# Patient Record
Sex: FEMALE | Race: WHITE | Employment: OTHER | URBAN - METROPOLITAN AREA
[De-identification: names, ages, dates, MRNs, and addresses within clinical notes are randomized per-mention and may not be internally consistent; named-entity substitution may affect disease eponyms.]

---

## 2017-07-02 ENCOUNTER — HOSPITAL ENCOUNTER (INPATIENT)
Age: 82
LOS: 9 days | Discharge: NURSING, REHAB OR LONG-TERM CARE | DRG: 690 | End: 2017-07-11
Attending: EMERGENCY MEDICINE | Admitting: INTERNAL MEDICINE
Payer: MEDICARE

## 2017-07-02 ENCOUNTER — APPOINTMENT (OUTPATIENT)
Dept: ULTRASOUND IMAGING | Age: 82
DRG: 690 | End: 2017-07-02
Attending: INTERNAL MEDICINE
Payer: MEDICARE

## 2017-07-02 ENCOUNTER — APPOINTMENT (OUTPATIENT)
Dept: GENERAL RADIOLOGY | Age: 82
DRG: 690 | End: 2017-07-02
Attending: EMERGENCY MEDICINE
Payer: MEDICARE

## 2017-07-02 DIAGNOSIS — R53.83 FATIGUE, UNSPECIFIED TYPE: ICD-10-CM

## 2017-07-02 DIAGNOSIS — M54.50 ACUTE BILATERAL LOW BACK PAIN WITHOUT SCIATICA: ICD-10-CM

## 2017-07-02 DIAGNOSIS — N17.9 AKI (ACUTE KIDNEY INJURY) (HCC): Primary | ICD-10-CM
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
## 2017-07-02 PROBLEM — N39.0 UTI (URINARY TRACT INFECTION): Status: ACTIVE | Noted: 2017-07-02

## 2017-07-02 LAB
ALBUMIN SERPL BCP-MCNC: 3.6 G/DL (ref 3.5–5)
ALBUMIN/GLOB SERPL: 0.9 {RATIO} (ref 1.1–2.2)
ALP SERPL-CCNC: 123 U/L (ref 45–117)
ALT SERPL-CCNC: 22 U/L (ref 12–78)
AMORPH CRY URNS QL MICRO: ABNORMAL
ANION GAP BLD CALC-SCNC: 15 MMOL/L (ref 5–15)
APPEARANCE UR: ABNORMAL
AST SERPL W P-5'-P-CCNC: 21 U/L (ref 15–37)
BACTERIA URNS QL MICRO: ABNORMAL /HPF
BASOPHILS # BLD AUTO: 0 K/UL (ref 0–0.1)
BASOPHILS # BLD: 0 % (ref 0–1)
BILIRUB SERPL-MCNC: 0.3 MG/DL (ref 0.2–1)
BILIRUB UR QL: NEGATIVE
BUN SERPL-MCNC: 44 MG/DL (ref 6–20)
BUN/CREAT SERPL: 11 (ref 12–20)
CALCIUM SERPL-MCNC: 9.3 MG/DL (ref 8.5–10.1)
CHLORIDE SERPL-SCNC: 102 MMOL/L (ref 97–108)
CK MB CFR SERPL CALC: NORMAL % (ref 0–2.5)
CK MB SERPL-MCNC: <1 NG/ML (ref 5–25)
CK SERPL-CCNC: 50 U/L (ref 26–192)
CO2 SERPL-SCNC: 20 MMOL/L (ref 21–32)
COLOR UR: ABNORMAL
CREAT SERPL-MCNC: 3.98 MG/DL (ref 0.55–1.02)
EOSINOPHIL # BLD: 0.2 K/UL (ref 0–0.4)
EOSINOPHIL NFR BLD: 1 % (ref 0–7)
EPITH CASTS URNS QL MICRO: ABNORMAL /LPF
ERYTHROCYTE [DISTWIDTH] IN BLOOD BY AUTOMATED COUNT: 15.9 % (ref 11.5–14.5)
GLOBULIN SER CALC-MCNC: 3.8 G/DL (ref 2–4)
GLUCOSE SERPL-MCNC: 124 MG/DL (ref 65–100)
GLUCOSE UR STRIP.AUTO-MCNC: NEGATIVE MG/DL
HCT VFR BLD AUTO: 40.8 % (ref 35–47)
HGB BLD-MCNC: 13.9 G/DL (ref 11.5–16)
HGB UR QL STRIP: ABNORMAL
KETONES UR QL STRIP.AUTO: NEGATIVE MG/DL
LEUKOCYTE ESTERASE UR QL STRIP.AUTO: ABNORMAL
LIPASE SERPL-CCNC: 84 U/L (ref 73–393)
LYMPHOCYTES # BLD AUTO: 8 % (ref 12–49)
LYMPHOCYTES # BLD: 1.1 K/UL (ref 0.8–3.5)
MCH RBC QN AUTO: 29.4 PG (ref 26–34)
MCHC RBC AUTO-ENTMCNC: 34.1 G/DL (ref 30–36.5)
MCV RBC AUTO: 86.3 FL (ref 80–99)
MONOCYTES # BLD: 1.1 K/UL (ref 0–1)
MONOCYTES NFR BLD AUTO: 7 % (ref 5–13)
NEUTS SEG # BLD: 12.8 K/UL (ref 1.8–8)
NEUTS SEG NFR BLD AUTO: 84 % (ref 32–75)
NITRITE UR QL STRIP.AUTO: POSITIVE
PH UR STRIP: 5.5 [PH] (ref 5–8)
PLATELET # BLD AUTO: 252 K/UL (ref 150–400)
POTASSIUM SERPL-SCNC: 4.3 MMOL/L (ref 3.5–5.1)
PROT SERPL-MCNC: 7.4 G/DL (ref 6.4–8.2)
PROT UR STRIP-MCNC: 30 MG/DL
RBC # BLD AUTO: 4.73 M/UL (ref 3.8–5.2)
RBC #/AREA URNS HPF: ABNORMAL /HPF (ref 0–5)
SODIUM SERPL-SCNC: 137 MMOL/L (ref 136–145)
SODIUM UR-SCNC: 55 MMOL/L
SP GR UR REFRACTOMETRY: 1.01 (ref 1–1.03)
TROPONIN I SERPL-MCNC: <0.04 NG/ML
UA: UC IF INDICATED,UAUC: ABNORMAL
UROBILINOGEN UR QL STRIP.AUTO: 0.2 EU/DL (ref 0.2–1)
WBC # BLD AUTO: 15.2 K/UL (ref 3.6–11)
WBC URNS QL MICRO: ABNORMAL /HPF (ref 0–4)

## 2017-07-02 PROCEDURE — 97530 THERAPEUTIC ACTIVITIES: CPT

## 2017-07-02 PROCEDURE — 82784 ASSAY IGA/IGD/IGG/IGM EACH: CPT | Performed by: INTERNAL MEDICINE

## 2017-07-02 PROCEDURE — 77030005563 HC CATH URETH INT MMGH -A

## 2017-07-02 PROCEDURE — 87186 SC STD MICRODIL/AGAR DIL: CPT | Performed by: INTERNAL MEDICINE

## 2017-07-02 PROCEDURE — 82550 ASSAY OF CK (CPK): CPT | Performed by: EMERGENCY MEDICINE

## 2017-07-02 PROCEDURE — 99285 EMERGENCY DEPT VISIT HI MDM: CPT

## 2017-07-02 PROCEDURE — 74011250637 HC RX REV CODE- 250/637: Performed by: INTERNAL MEDICINE

## 2017-07-02 PROCEDURE — 74011250636 HC RX REV CODE- 250/636: Performed by: FAMILY MEDICINE

## 2017-07-02 PROCEDURE — 36415 COLL VENOUS BLD VENIPUNCTURE: CPT | Performed by: INTERNAL MEDICINE

## 2017-07-02 PROCEDURE — 87086 URINE CULTURE/COLONY COUNT: CPT | Performed by: INTERNAL MEDICINE

## 2017-07-02 PROCEDURE — 85025 COMPLETE CBC W/AUTO DIFF WBC: CPT | Performed by: EMERGENCY MEDICINE

## 2017-07-02 PROCEDURE — 81001 URINALYSIS AUTO W/SCOPE: CPT | Performed by: INTERNAL MEDICINE

## 2017-07-02 PROCEDURE — 93005 ELECTROCARDIOGRAM TRACING: CPT

## 2017-07-02 PROCEDURE — 84484 ASSAY OF TROPONIN QUANT: CPT | Performed by: EMERGENCY MEDICINE

## 2017-07-02 PROCEDURE — 96374 THER/PROPH/DIAG INJ IV PUSH: CPT

## 2017-07-02 PROCEDURE — 72100 X-RAY EXAM L-S SPINE 2/3 VWS: CPT

## 2017-07-02 PROCEDURE — 87077 CULTURE AEROBIC IDENTIFY: CPT | Performed by: INTERNAL MEDICINE

## 2017-07-02 PROCEDURE — 74011250636 HC RX REV CODE- 250/636: Performed by: EMERGENCY MEDICINE

## 2017-07-02 PROCEDURE — 84300 ASSAY OF URINE SODIUM: CPT | Performed by: INTERNAL MEDICINE

## 2017-07-02 PROCEDURE — 84165 PROTEIN E-PHORESIS SERUM: CPT | Performed by: INTERNAL MEDICINE

## 2017-07-02 PROCEDURE — 74011000258 HC RX REV CODE- 258: Performed by: INTERNAL MEDICINE

## 2017-07-02 PROCEDURE — 83690 ASSAY OF LIPASE: CPT | Performed by: EMERGENCY MEDICINE

## 2017-07-02 PROCEDURE — 97162 PT EVAL MOD COMPLEX 30 MIN: CPT

## 2017-07-02 PROCEDURE — 80053 COMPREHEN METABOLIC PANEL: CPT | Performed by: EMERGENCY MEDICINE

## 2017-07-02 PROCEDURE — 76770 US EXAM ABDO BACK WALL COMP: CPT

## 2017-07-02 PROCEDURE — 51701 INSERT BLADDER CATHETER: CPT

## 2017-07-02 PROCEDURE — 65660000000 HC RM CCU STEPDOWN

## 2017-07-02 PROCEDURE — 74011250636 HC RX REV CODE- 250/636: Performed by: INTERNAL MEDICINE

## 2017-07-02 PROCEDURE — 96361 HYDRATE IV INFUSION ADD-ON: CPT

## 2017-07-02 RX ORDER — LEVOTHYROXINE SODIUM 50 UG/1
50 TABLET ORAL
Status: DISCONTINUED | OUTPATIENT
Start: 2017-07-02 | End: 2017-07-11 | Stop reason: HOSPADM

## 2017-07-02 RX ORDER — SODIUM CHLORIDE 0.9 % (FLUSH) 0.9 %
5-10 SYRINGE (ML) INJECTION EVERY 8 HOURS
Status: DISCONTINUED | OUTPATIENT
Start: 2017-07-02 | End: 2017-07-10 | Stop reason: ALTCHOICE

## 2017-07-02 RX ORDER — TRAMADOL HYDROCHLORIDE 50 MG/1
50 TABLET ORAL
Status: DISCONTINUED | OUTPATIENT
Start: 2017-07-02 | End: 2017-07-11 | Stop reason: HOSPADM

## 2017-07-02 RX ORDER — HEPARIN SODIUM 5000 [USP'U]/ML
5000 INJECTION, SOLUTION INTRAVENOUS; SUBCUTANEOUS EVERY 8 HOURS
Status: DISCONTINUED | OUTPATIENT
Start: 2017-07-02 | End: 2017-07-11 | Stop reason: HOSPADM

## 2017-07-02 RX ORDER — ONDANSETRON 2 MG/ML
4 INJECTION INTRAMUSCULAR; INTRAVENOUS
Status: DISCONTINUED | OUTPATIENT
Start: 2017-07-02 | End: 2017-07-11 | Stop reason: HOSPADM

## 2017-07-02 RX ORDER — SODIUM CHLORIDE 9 MG/ML
75 INJECTION, SOLUTION INTRAVENOUS CONTINUOUS
Status: DISCONTINUED | OUTPATIENT
Start: 2017-07-02 | End: 2017-07-07

## 2017-07-02 RX ORDER — VENLAFAXINE HYDROCHLORIDE 75 MG/1
75 CAPSULE, EXTENDED RELEASE ORAL DAILY
COMMUNITY

## 2017-07-02 RX ORDER — ACETAMINOPHEN AND CODEINE PHOSPHATE 300; 15 MG/1; MG/1
1 TABLET ORAL
COMMUNITY
End: 2017-07-17

## 2017-07-02 RX ORDER — NALOXONE HYDROCHLORIDE 0.4 MG/ML
0.4 INJECTION, SOLUTION INTRAMUSCULAR; INTRAVENOUS; SUBCUTANEOUS AS NEEDED
Status: DISCONTINUED | OUTPATIENT
Start: 2017-07-02 | End: 2017-07-11 | Stop reason: HOSPADM

## 2017-07-02 RX ORDER — GABAPENTIN 100 MG/1
100 CAPSULE ORAL 3 TIMES DAILY
COMMUNITY
End: 2017-07-24

## 2017-07-02 RX ORDER — ONDANSETRON 2 MG/ML
4 INJECTION INTRAMUSCULAR; INTRAVENOUS ONCE
Status: COMPLETED | OUTPATIENT
Start: 2017-07-02 | End: 2017-07-02

## 2017-07-02 RX ORDER — SODIUM CHLORIDE 0.9 % (FLUSH) 0.9 %
5-10 SYRINGE (ML) INJECTION AS NEEDED
Status: DISCONTINUED | OUTPATIENT
Start: 2017-07-02 | End: 2017-07-11 | Stop reason: HOSPADM

## 2017-07-02 RX ORDER — BUPROPION HYDROCHLORIDE 150 MG/1
300 TABLET ORAL DAILY
Status: DISCONTINUED | OUTPATIENT
Start: 2017-07-02 | End: 2017-07-03

## 2017-07-02 RX ORDER — ONDANSETRON 2 MG/ML
4 INJECTION INTRAMUSCULAR; INTRAVENOUS
Status: DISPENSED | OUTPATIENT
Start: 2017-07-02 | End: 2017-07-02

## 2017-07-02 RX ORDER — BUPROPION HYDROCHLORIDE 300 MG/1
300 TABLET ORAL
COMMUNITY

## 2017-07-02 RX ADMIN — Medication 10 ML: at 05:02

## 2017-07-02 RX ADMIN — BUPROPION HYDROCHLORIDE 300 MG: 150 TABLET, FILM COATED, EXTENDED RELEASE ORAL at 09:35

## 2017-07-02 RX ADMIN — HEPARIN SODIUM 5000 UNITS: 5000 INJECTION, SOLUTION INTRAVENOUS; SUBCUTANEOUS at 23:02

## 2017-07-02 RX ADMIN — TRAMADOL HYDROCHLORIDE 50 MG: 50 TABLET, FILM COATED ORAL at 10:32

## 2017-07-02 RX ADMIN — VENLAFAXINE HYDROCHLORIDE 225 MG: 150 CAPSULE, EXTENDED RELEASE ORAL at 09:35

## 2017-07-02 RX ADMIN — HEPARIN SODIUM 5000 UNITS: 5000 INJECTION, SOLUTION INTRAVENOUS; SUBCUTANEOUS at 05:02

## 2017-07-02 RX ADMIN — Medication 10 ML: at 23:03

## 2017-07-02 RX ADMIN — LEVOTHYROXINE SODIUM 50 MCG: 50 TABLET ORAL at 08:02

## 2017-07-02 RX ADMIN — Medication 10 ML: at 15:58

## 2017-07-02 RX ADMIN — TRAMADOL HYDROCHLORIDE 50 MG: 50 TABLET, FILM COATED ORAL at 04:26

## 2017-07-02 RX ADMIN — SODIUM CHLORIDE 75 ML/HR: 900 INJECTION, SOLUTION INTRAVENOUS at 16:49

## 2017-07-02 RX ADMIN — SODIUM CHLORIDE 500 ML: 900 INJECTION, SOLUTION INTRAVENOUS at 00:54

## 2017-07-02 RX ADMIN — TRAMADOL HYDROCHLORIDE 50 MG: 50 TABLET, FILM COATED ORAL at 16:48

## 2017-07-02 RX ADMIN — ONDANSETRON 4 MG: 2 INJECTION INTRAMUSCULAR; INTRAVENOUS at 16:11

## 2017-07-02 RX ADMIN — SODIUM CHLORIDE 75 ML/HR: 900 INJECTION, SOLUTION INTRAVENOUS at 05:02

## 2017-07-02 RX ADMIN — ONDANSETRON 4 MG: 2 INJECTION INTRAMUSCULAR; INTRAVENOUS at 00:54

## 2017-07-02 RX ADMIN — HEPARIN SODIUM 5000 UNITS: 5000 INJECTION, SOLUTION INTRAVENOUS; SUBCUTANEOUS at 12:36

## 2017-07-02 RX ADMIN — CEFTRIAXONE SODIUM 1 G: 1 INJECTION, POWDER, FOR SOLUTION INTRAMUSCULAR; INTRAVENOUS at 04:26

## 2017-07-02 NOTE — PROGRESS NOTES
Primary Nurse Mairlia Arcos RN and Wilver Mckinney RN performed a dual skin assessment on this patient No impairment noted  Rudi score is 18

## 2017-07-02 NOTE — PROGRESS NOTES
Gave verbal sign-out over the phone at 7:02 AM to Dr. Smita Pastor whose practice has accepted care of this patient.       Christian Hernandez MD  7/2/2017

## 2017-07-02 NOTE — H&P
[address]  [phone]    Hospitalist Admission Note      NAME:  Reji Santos   :   1932   MRN:  [id number]     PCP:  Jed Mccoy MD     Date/Time:  2017 4:34 AM          Subjective:     CHIEF COMPLAINT: back pain, fall    HISTORY OF PRESENT ILLNESS:     Ms. David Tripathi is a 80 y.o. female w/ hx of HTN, hypothyroidism, depression, prior PE who presents with low back pain after fall. Per family at bedside, pt has been very weak in recent weeks, intermittent, moderate, worsening, associated with falling at home. Yesterday, pt was walking to bathroom and fell to the floor on her back. Now with severe back pain. No weakness or numbness. No fevers/chills, cough, CP, SOB. Reports nausea    ED workup showed severe NIMCO, leukocytosis. Ms. David Tripathi is admitted for further evaluation and management. Past Medical History:   Diagnosis Date    Arthritis     Asthma     Autoimmune hemolytic anemia (HCC)     Depression     Heart murmur     Hypertension     Hypothyroidism     Pulmonary embolism (HCC)     Thromboembolus (HCC)     Vertigo         Past Surgical History:   Procedure Laterality Date    HX COLONOSCOPY      HX HEENT      HX HYSTERECTOMY      HX ORTHOPAEDIC  R total hip    HX REFRACTIVE SURGERY         Social History   Substance Use Topics    Smoking status: Never Smoker    Smokeless tobacco: Not on file    Alcohol use No        Family History: family history of various cancers. +HTN, +DM    Allergies   Allergen Reactions    Percocet [Oxycodone-Acetaminophen] Anxiety     Anxiety, shaking     Pcn [Penicillins] Rash     Patient states this happened ~ 40 years ago  Tolerates ceftriaxone    Sulfa (Sulfonamide Antibiotics) Itching and Vertigo        Prior to Admission medications    Medication Sig Start Date End Date Taking?  Authorizing Provider   gabapentin (NEURONTIN) 100 mg capsule Take 100 mg by mouth three (3) times daily. Yes Melo Raya MD   acetaminophen-codeine (TYLENOL #2) 300-15 mg per tablet Take 1 Tab by mouth every four (4) hours as needed for Pain. Yes Melo Raya MD   buPROPion (WELLBUTRIN) 75 mg tablet Take 300 mg by mouth two (2) times a day. Yes Melo Raya MD   venlafaxine-SR (EFFEXOR XR) 150 mg capsule Take 225 mg by mouth daily. Yes Melo Raya MD   levothyroxine (LEVOTHROID) 50 mcg tablet Take 50 mcg by mouth Daily (before breakfast). Yes Historical Provider   albuterol (PROAIR HFA) 90 mcg/actuation inhaler Take 2 puffs by inhalation every four (4) hours as needed. Historical Provider   CARBAMIDE PEROXIDE (EAR WAX DROPS OT) 1 drop by Otic route daily as needed. Historical Provider       Review of Systems:  (bold if positive, if negative)    Gen:  fatigueEyes:  ENT:  CVS:  Pulm:  GI:  nausea, emesis  :    MS:  Pain, weakness, arthritisSkin:  Psych:  Endo:    Hem:  Renal:    Neuro:            Objective:      VITALS:    Vital signs reviewed; most recent are:    Visit Vitals    /70 (BP 1 Location: Left arm, BP Patient Position: At rest)    Pulse 80    Temp 97.3 °F (36.3 °C)    Resp 15    Ht 5' 3\" (1.6 m)    Wt 70.3 kg (155 lb)    SpO2 100%    Breastfeeding No    BMI 27.46 kg/m2     SpO2 Readings from Last 6 Encounters:   07/02/17 100%   09/16/16 94%   04/04/16 98%   04/08/15 97%   04/08/15 97%   02/09/15 92%        No intake or output data in the 24 hours ending 07/02/17 8094         Exam:     Physical Exam:    Gen:  Elderly, chronically ill-appearing. NAD  HEENT:  No scleral icterus, PERRL, hearing intact to voice, dry mucous membranes  Neck:  Supple, without masses. Thyroid non-tender  Resp:  No accessory muscle use. CTAB without wheezing, rales, rhonchi  Card: RRR. Normal S1 and S2 without murmurs, rubs, or gallops. No peripheral lower extremity edema. No JVD. Peripheral pulses in tact. Abd:  Normoactive bowel sounds. Soft, non-tender, non-distended. No rebound, no guarding. No appreciable hepatosplenomegaly   Lymph:  No cervical adenopathy  Musc:  No cyanosis or clubbing  Skin:  No rashes or ulcers; turgor intact. Neuro:  Cranial nerves are grossly intact, no focal motor weakness, follows commands appropriately  Psych:  Fair insight, normal affect. Alert, oriented to self and hospital. Answers questions appropriately       Labs:    Recent Labs      07/02/17   0058   WBC  15.2*   HGB  13.9   HCT  40.8   PLT  252     Recent Labs      07/02/17 0058   NA  137   K  4.3   CL  102   CO2  20*   GLU  124*   BUN  44*   CREA  3.98*   CA  9.3   ALB  3.6   SGOT  21   ALT  22     No components found for: GLPOC  No results for input(s): PH, PCO2, PO2, HCO3, FIO2 in the last 72 hours. No results for input(s): INR in the last 72 hours. No lab exists for component: INREXT  No results found for: SDES  Lab Results   Component Value Date/Time    Culture result: ESCHERICHIA COLI 09/16/2016 02:10 PM    Culture result: NO GROWTH 6 DAYS 12/21/2014 05:30 PM    Culture result: NO GROWTH 2 DAYS 12/21/2014 03:50 PM     All other current labs reviewed in the computer. Imaging/Studies:    XR L-spine  1. Superior endplate depression, mild, at L3, age undetermined but new since the  prior study 12/23/2014. This may be acute or subacute. 2. Stable old L1 superior endplate compression. 3. Distal abdominal aortic ectasia. 4. Stable degenerative disc disease at L5-S1. EKG: NSR, no ST-T changes. QTc 462  EKG personally reviewed         Assessment / Plan:       80 y.o. female with hx of HTN, hypothyroidism, depression, prior PE who presents with low back pain after fall, admitted for severe NIMCO    NIMCO (acute kidney injury): unclear etiology. Has mild IVVD  -- send urine Na, Cr, Eos  -- SPEP, UPEP, WILLIAMS  -- renal US  -- nephrology consult     Leukocytosis: unclear etiology. No fevers.  Inflammatory vs infectious  -- start IV CTX for UTI follow urine culture      HTN (hypertension) (10/19/2014): largely controlled  -- not on meds      Depression (10/19/2014)  -- cont Effexor and Wellbutrin      Hypothyroidism (10/19/2014)  -- cont LT4; check TSH      History of PE: not on anticoagulation  -- DVT ppx on heparin SQ       Weakness / Fall: lives with grand-daughter   -- PT / OT, fall precautions      Code Status: DNR, d/w family     Surrogate decision maker: granddaughter       ED notes reviewed, prior oncology notes reviewed    Total time spent with patient: 79 Minutes     Risk of deterioration: High                 Care Plan discussed with: ED provider, Patient and Nursing Staff    Discussed:  Code Status, Care Plan and D/C Planning    Prophylaxis:  Hep SQ    Disposition:  Home w/Family       ___________________________________________________    Attending Physician: Yulisa Mendez MD

## 2017-07-02 NOTE — ROUTINE PROCESS
TRANSFER - OUT REPORT:    Verbal report given to receiving RN (name) on Maria Fernanda Piper-Kaylan  being transferred to room 524 (unit) for routine progression of care       Report consisted of patients Situation, Background, Assessment and   Recommendations(SBAR). Information from the following report(s) SBAR, Kardex, ED Summary, MAR, Recent Results and Cardiac Rhythm NSR was reviewed with the receiving nurse. Lines:   Peripheral IV 07/02/17 Right Forearm (Active)   Site Assessment Clean, dry, & intact 7/2/2017 12:53 AM   Phlebitis Assessment 0 7/2/2017 12:53 AM   Infiltration Assessment 0 7/2/2017 12:53 AM   Dressing Status Clean, dry, & intact 7/2/2017 12:53 AM   Dressing Type Transparent 7/2/2017 12:53 AM   Hub Color/Line Status Pink;Flushed 7/2/2017 12:53 AM   Action Taken Blood drawn 7/2/2017 12:53 AM        Opportunity for questions and clarification was provided.       Patient transported with:   Monitor  Registered Nurse

## 2017-07-02 NOTE — IP AVS SNAPSHOT
Marlin Rodriguez 
 
 
 [address] 
[phone] Patient: Morris Moreno MRN: [id number] SNJ:79/0/4344 You are allergic to the following Allergen Reactions Percocet (Oxycodone-Acetaminophen) Anxiety Anxiety, shaking Pcn (Penicillins) Rash Patient states this happened ~ 40 years ago Tolerates ceftriaxone Sulfa (Sulfonamide Antibiotics) Itching Vertigo Recent Documentation Height Weight Breastfeeding? BMI OB Status Smoking Status 1.6 m 70.3 kg No 27.46 kg/m2 Hysterectomy Never Smoker Emergency Contacts Name Discharge Info Relation Home Work Mobile [facility] [3] Other Relative [6] [phone] About your hospitalization You were admitted on:  July 2, 2017 You last received care in the:  [facility]  MED SURG 2 You were discharged on:  July 11, 2017 Unit phone number:  [phone] Why you were hospitalized Your primary diagnosis was:  Not on File Your diagnoses also included:  Raf (Acute Kidney Injury) (Hcc), Hypothyroidism, Htn (Hypertension), Depression, Leukocytosis, Uti (Urinary Tract Infection) Providers Seen During Your Hospitalizations Provider Role Specialty Primary office phone Noel Jarrett MD Attending Provider Emergency Medicine [phone] Francy Obrien MD Attending Provider Internal Medicine [phone] Sridhar Bower MD Attending Provider [facility] [phone] Your Primary Care Physician (PCP) Primary Care Physician Office Phone Office Fax [facility] [phone] Follow-up Information Follow up With Details Comments Contact Info Rajendra Hinton NP  call for Neurological follow up after discharge from [place] [address] [address] [facility] [address] 
[phone] MD Merrick Cast [facility] [address] [facility] [address] [place] [address] 
[phone] [facility])   [address] 
[phone] Current Discharge Medication List  
  
START taking these medications Dose & Instructions Dispensing Information Comments Morning Noon Evening Bedtime  
 magnesium oxide 400 mg tablet Commonly known as:  MAG-OX Your last dose was: Your next dose is:    
   
   
 Dose:  400 mg Take 1 Tab by mouth two (2) times a day. Quantity:  60 Tab Refills:  0  
     
   
   
   
  
 potassium chloride SR 10 mEq tablet Commonly known as:  KLOR-CON 10 Your last dose was: Your next dose is:    
   
   
 Dose:  10 mEq Take 1 Tab by mouth two (2) times a day. Quantity:  60 Tab Refills:  0  
     
   
   
   
  
 traZODone 50 mg tablet Commonly known as:  Rayma Medal Your last dose was: Your next dose is:    
   
   
 Dose:  25 mg Take 0.5 Tabs by mouth nightly. Quantity:  30 Tab Refills:  0 CONTINUE these medications which have NOT CHANGED Dose & Instructions Dispensing Information Comments Morning Noon Evening Bedtime  
 acetaminophen-codeine 300-15 mg per tablet Commonly known as:  TYLENOL #2 Your last dose was: Your next dose is:    
   
   
 Dose:  1 Tab Take 1 Tab by mouth every four (4) hours as needed for Pain. Refills:  0  
     
   
   
   
  
 EAR WAX DROPS OT Your last dose was: Your next dose is:    
   
   
 Dose:  1 Drop  
1 drop by Otic route daily as needed. Refills:  0  
     
   
   
   
  
 * EFFEXOR  mg capsule Generic drug:  venlafaxine-SR Your last dose was: Your next dose is:    
   
   
 Dose:  150 mg Take 150 mg by mouth daily. In addition to 75 mg daily. Total 225 mg daily. Refills:  0  
     
   
   
   
  
 * venlafaxine-SR 75 mg capsule Commonly known as:  EFFEXOR-XR Your last dose was: Your next dose is:    
   
   
 Dose:  75 mg Take 75 mg by mouth daily. In addition to 150 mg daily. Total 225 mg daily. Refills:  0  
     
   
   
   
  
 gabapentin 100 mg capsule Commonly known as:  NEURONTIN Your last dose was: Your next dose is:    
   
   
 Dose:  100 mg Take 100 mg by mouth three (3) times daily. Refills:  0 LEVOTHROID 50 mcg tablet Generic drug:  levothyroxine Your last dose was: Your next dose is:    
   
   
 Dose:  50 mcg Take 50 mcg by mouth Daily (before breakfast). Refills:  0 PROAIR HFA 90 mcg/actuation inhaler Generic drug:  albuterol Your last dose was: Your next dose is:    
   
   
 Dose:  2 Puff Take 2 puffs by inhalation every four (4) hours as needed. Refills:  0 WELLBUTRIN  mg XL tablet Generic drug:  buPROPion XL Your last dose was: Your next dose is:    
   
   
 Dose:  300 mg Take 300 mg by mouth every morning. Refills:  0  
     
   
   
   
  
 * Notice: This list has 2 medication(s) that are the same as other medications prescribed for you. Read the directions carefully, and ask your doctor or other care provider to review them with you. Where to Get Your Medications Information on where to get these meds will be given to you by the nurse or doctor. ! Ask your nurse or doctor about these medications  
  magnesium oxide 400 mg tablet  
 potassium chloride SR 10 mEq tablet  
 traZODone 50 mg tablet Discharge Instructions Patient Discharge Instructions Brent Martinez / [id number] : 1932 Admitted 2017 Discharged: 2017 12:28 PM  
 
ACUTE DIAGNOSES: 
NIMCO (acute kidney injury) ([facility] CHRONIC MEDICAL DIAGNOSES: 
Problem List as of 2017  Date Reviewed: 2017 Codes Class Noted - Resolved UTI (urinary tract infection) ICD-10-CM: N39.0 ICD-9-CM: 599.0  7/2/2017 - Present Acute respiratory failure ([facility] ICD-10-CM: J96.00 
ICD-9-CM: 518.81  12/21/2014 - Present PE (pulmonary embolism) ICD-10-CM: I26.99 
ICD-9-CM: 415.19  12/11/2014 - Present NIMCO (acute kidney injury) ([facility] ICD-10-CM: N17.9 ICD-9-CM: 584.9  12/11/2014 - Present Renal lesion ICD-10-CM: N28.9 ICD-9-CM: 593.9  11/5/2014 - Present Autoimmune hemolytic anemia (HCC) ICD-10-CM: D59.1 ICD-9-CM: 283.0  11/5/2014 - Present Leukocytosis ICD-10-CM: Z67.154 ICD-9-CM: 288.60  10/25/2014 - Present HTN (hypertension) ICD-10-CM: I10 
ICD-9-CM: 401.9  10/19/2014 - Present Depression ICD-10-CM: F32.9 ICD-9-CM: 931  10/19/2014 - Present Hypothyroidism ICD-10-CM: E03.9 ICD-9-CM: 244.9  10/19/2014 - Present Diarrhea ICD-10-CM: R19.7 ICD-9-CM: 787.91  10/19/2014 - Present Leukopenia ICD-10-CM: D72.819 ICD-9-CM: 288.50  10/19/2014 - Present Anemia ICD-10-CM: D64.9 ICD-9-CM: 285.9  10/19/2014 - Present Transaminitis ICD-10-CM: R74.0 ICD-9-CM: 790.4  10/19/2014 - Present Hyperbilirubinemia ICD-10-CM: E80.6 ICD-9-CM: 782.4  10/19/2014 - Present Weakness ICD-10-CM: R53.1 ICD-9-CM: 780.79  10/18/2014 - Present DISCHARGE MEDICATIONS:  
 
 
 
· It is important that you take the medication exactly as they are prescribed. · Keep your medication in the bottles provided by the pharmacist and keep a list of the medication names, dosages, and times to be taken in your wallet. · Do not take other medications without consulting your doctor. DIET:  Cardiac Diet and Diabetic Diet ACTIVITY: Activity as tolerated ADDITIONAL INFORMATION: If you experience any of the following symptoms then please call your primary care physician or return to the emergency room if you cannot get hold of your doctor: Fever, chills, nausea, vomiting, diarrhea, change in mentation, falling, bleeding, shortness of breath. FOLLOW UP CARE: 
Dr. Papo Huang MD  you are to call and set up an appointment to see them with in 1 week. Follow-up with specialists at directed by them Information obtained by : 
I understand that if any problems occur once I am at home I am to contact my physician. I understand and acknowledge receipt of the instructions indicated above. Physician's or R.N.'s Signature                                                                  Date/Time Patient or Representative Signature                                                          Date/Time Discharge Orders None [employer] Announcement We are excited to announce that we are making your provider's discharge notes available to you in [email or web address]. You will see these notes when they are completed and signed by the physician that discharged you from your recent hospital stay. If you have any questions or concerns about any information you see in [employer], please call the Health Information Department where you were seen or reach out to your Primary Care Provider for more information about your plan of care. Introducing [facility]! Dear [employer]: 
Thank you for requesting a [employer] account. Our records indicate that you have previously registered for a [employer] account but its currently inactive. Please call our [employer] support line at [phone]. Additional Information If you have questions, please visit the Frequently Asked Questions section of the [employer] website at [email or web address] Remember, MyChart is NOT to be used for urgent needs. For medical emergencies, dial 911. Now available from your iPhone and Android! General Information Please provide this summary of care documentation to your next provider. Patient Signature:  ____________________________________________________________ Date:  ____________________________________________________________  
  
Desmond Brake Provider Signature:  ____________________________________________________________ Date:  ____________________________________________________________

## 2017-07-02 NOTE — PROGRESS NOTES
Daily Progress Note: 7/2/2017  Maria Fernanda Piper-Kaylan Jimenez MD    Assessment/Plan:   NIMCO: unclear etiology. Has mild IVVD  --renal labs pending  --will check renal US  -- nephrology consult      Leukocytosis: unclear etiology. No fevers.  Inflammatory vs infectious  -- start IV CTX for UTI follow urine culture    Back pain - superior endplate depression, mild L3  --pain control - consider ortho consult       HTN: watch  -- not on meds       Depression  -- cont Effexor and Wellbutrin       Hypothyroidism  -- cont LT4; check TSH       History of PE: not on anticoagulation  -- DVT ppx on heparin SQ        Weakness / Fall: lives with grand-daughter   -- PT / OT, fall precautions       Problem List:  Problem List as of 7/2/2017  Date Reviewed: 7/2/2017          Codes Class Noted - Resolved    UTI (urinary tract infection) ICD-10-CM: N39.0  ICD-9-CM: 599.0  7/2/2017 - Present        Acute respiratory failure ([facility] ICD-10-CM: J96.00  ICD-9-CM: 518.81  12/21/2014 - Present        PE (pulmonary embolism) ICD-10-CM: I26.99  ICD-9-CM: 415.19  12/11/2014 - Present        NIMCO (acute kidney injury) ([facility] ICD-10-CM: N17.9  ICD-9-CM: 584.9  12/11/2014 - Present        Renal lesion ICD-10-CM: N28.9  ICD-9-CM: 593.9  11/5/2014 - Present        Autoimmune hemolytic anemia ([facility] ICD-10-CM: D59.1  ICD-9-CM: 283.0  11/5/2014 - Present        Leukocytosis ICD-10-CM: J69.437  ICD-9-CM: 288.60  10/25/2014 - Present        HTN (hypertension) ICD-10-CM: I10  ICD-9-CM: 401.9  10/19/2014 - Present        Depression ICD-10-CM: F32.9  ICD-9-CM: 723  10/19/2014 - Present        Hypothyroidism ICD-10-CM: E03.9  ICD-9-CM: 244.9  10/19/2014 - Present        Diarrhea ICD-10-CM: R19.7  ICD-9-CM: 787.91  10/19/2014 - Present        Leukopenia ICD-10-CM: D72.819  ICD-9-CM: 288.50  10/19/2014 - Present        Anemia ICD-10-CM: D64.9  ICD-9-CM: 285.9  10/19/2014 - Present        Transaminitis ICD-10-CM: R74.0  ICD-9-CM: 790.4  10/19/2014 - Present        Hyperbilirubinemia ICD-10-CM: E80.6  ICD-9-CM: 782.4  10/19/2014 - Present        Weakness ICD-10-CM: R53.1  ICD-9-CM: 780.79  10/18/2014 - Present              Subjective:   H&P: Ms. Richard Reyez is a 80 y.o. female w/ hx of HTN, hypothyroidism, depression, prior PE who presents with low back pain after fall. Per family at bedside, pt has been very weak in recent weeks, intermittent, moderate, worsening, associated with falling at home. Yesterday, pt was walking to bathroom and fell to the floor on her back. Now with severe back pain. No weakness or numbness. No fevers/chills, cough, CP, SOB. Reports nausea. ED workup showed severe NIMCO, leukocytosis. :  Patient c/o at least a week of nausea, not eating much. No abd. Pain, bowels have been OK. She denies dysuria. She has had chronic back pain, and now worse after her fall. This AM she still is with back pain but improved, she just ate some breakfast and feels like it will probably stay down. Review of Systems:   A comprehensive review of systems was negative except for that written in the HPI. Objective:   Physical Exam:     Visit Vitals    /70 (BP 1 Location: Left arm, BP Patient Position: At rest)    Pulse 80    Temp 97.3 °F (36.3 °C)    Resp 15    Ht 5' 3\" (1.6 m)    Wt 70.3 kg (155 lb)    SpO2 100%    Breastfeeding No    BMI 27.46 kg/m2      O2 Device: Room air    Temp (24hrs), Av.4 °F (36.3 °C), Min:97.3 °F (36.3 °C), Max:97.5 °F (36.4 °C)             General:  Alert, cooperative, no distress, appears stated age. Head:  Normocephalic, without obvious abnormality, atraumatic. Eyes:  Conjunctivae/corneas clear. PERRL, EOMs intact. Nose: Nares normal. Septum midline. Mucosa normal. No drainage or sinus tenderness. Throat: Lips, mucosa, and tongue moist..   Neck: Supple, symmetrical, trachea midline   Lungs:   Clear to auscultation bilaterally.    Heart:  Regular rate and rhythm, S1, S2 normal, no murmur, click, rub or gallop. Abdomen:   Soft, non-tender. Bowel sounds normal. No masses,  No organomegaly. Extremities: Extremities normal, atraumatic, no cyanosis or edema. No calf tenderness or cords. Pulses: 2+ and symmetric all extremities. Skin: Skin color, texture, turgor normal. No rashes or lesions   Neurologic: CNII-XII intact. Alert and oriented X 3. Fine motor of hands and fingers normal.      Data Review:       Recent Days:  Recent Labs      07/02/17   0058   WBC  15.2*   HGB  13.9   HCT  40.8   PLT  252     Recent Labs      07/02/17   0058   NA  137   K  4.3   CL  102   CO2  20*   GLU  124*   BUN  44*   CREA  3.98*   CA  9.3   ALB  3.6   SGOT  21   ALT  22     No results for input(s): PH, PCO2, PO2, HCO3, FIO2 in the last 72 hours. 24 Hour Results:  Recent Results (from the past 24 hour(s))   CBC WITH AUTOMATED DIFF    Collection Time: 07/02/17 12:58 AM   Result Value Ref Range    WBC 15.2 (H) 3.6 - 11.0 K/uL    RBC 4.73 3.80 - 5.20 M/uL    HGB 13.9 11.5 - 16.0 g/dL    HCT 40.8 35.0 - 47.0 %    MCV 86.3 80.0 - 99.0 FL    MCH 29.4 26.0 - 34.0 PG    MCHC 34.1 30.0 - 36.5 g/dL    RDW 15.9 (H) 11.5 - 14.5 %    PLATELET 467 930 - 396 K/uL    NEUTROPHILS 84 (H) 32 - 75 %    LYMPHOCYTES 8 (L) 12 - 49 %    MONOCYTES 7 5 - 13 %    EOSINOPHILS 1 0 - 7 %    BASOPHILS 0 0 - 1 %    ABS. NEUTROPHILS 12.8 (H) 1.8 - 8.0 K/UL    ABS. LYMPHOCYTES 1.1 0.8 - 3.5 K/UL    ABS. MONOCYTES 1.1 (H) 0.0 - 1.0 K/UL    ABS. EOSINOPHILS 0.2 0.0 - 0.4 K/UL    ABS.  BASOPHILS 0.0 0.0 - 0.1 K/UL   METABOLIC PANEL, COMPREHENSIVE    Collection Time: 07/02/17 12:58 AM   Result Value Ref Range    Sodium 137 136 - 145 mmol/L    Potassium 4.3 3.5 - 5.1 mmol/L    Chloride 102 97 - 108 mmol/L    CO2 20 (L) 21 - 32 mmol/L    Anion gap 15 5 - 15 mmol/L    Glucose 124 (H) 65 - 100 mg/dL    BUN 44 (H) 6 - 20 MG/DL    Creatinine 3.98 (H) 0.55 - 1.02 MG/DL    BUN/Creatinine ratio 11 (L) 12 - 20      GFR est AA 13 (L) >60 ml/min/1.73m2    GFR est non-AA 11 (L) >60 ml/min/1.73m2    Calcium 9.3 8.5 - 10.1 MG/DL    Bilirubin, total 0.3 0.2 - 1.0 MG/DL    ALT (SGPT) 22 12 - 78 U/L    AST (SGOT) 21 15 - 37 U/L    Alk.  phosphatase 123 (H) 45 - 117 U/L    Protein, total 7.4 6.4 - 8.2 g/dL    Albumin 3.6 3.5 - 5.0 g/dL    Globulin 3.8 2.0 - 4.0 g/dL    A-G Ratio 0.9 (L) 1.1 - 2.2     TROPONIN I    Collection Time: 07/02/17 12:58 AM   Result Value Ref Range    Troponin-I, Qt. <0.04 <0.05 ng/mL   LIPASE    Collection Time: 07/02/17 12:58 AM   Result Value Ref Range    Lipase 84 73 - 393 U/L   CK W/ CKMB & INDEX    Collection Time: 07/02/17 12:58 AM   Result Value Ref Range    CK 50 26 - 192 U/L    CK - MB <1.0 <3.6 NG/ML    CK-MB Index Cannot be calulated 0 - 2.5     URINALYSIS W/ REFLEX CULTURE    Collection Time: 07/02/17  2:41 AM   Result Value Ref Range    Color YELLOW/STRAW      Appearance CLOUDY (A) CLEAR      Specific gravity 1.015 1.003 - 1.030      pH (UA) 5.5 5.0 - 8.0      Protein 30 (A) NEG mg/dL    Glucose NEGATIVE  NEG mg/dL    Ketone NEGATIVE  NEG mg/dL    Bilirubin NEGATIVE  NEG      Blood SMALL (A) NEG      Urobilinogen 0.2 0.2 - 1.0 EU/dL    Nitrites POSITIVE (A) NEG      Leukocyte Esterase TRACE (A) NEG      WBC 5-10 0 - 4 /hpf    RBC 0-5 0 - 5 /hpf    Epithelial cells FEW FEW /lpf    Bacteria 1+ (A) NEG /hpf    UA:UC IF INDICATED URINE CULTURE ORDERED (A) CNI      Amorphous Crystals 2+ (A) NEG   EKG, 12 LEAD, INITIAL    Collection Time: 07/02/17  4:15 AM   Result Value Ref Range    Ventricular Rate 81 BPM    Atrial Rate 81 BPM    P-R Interval 196 ms    QRS Duration 108 ms    Q-T Interval 398 ms    QTC Calculation (Bezet) 462 ms    Calculated P Axis 19 degrees    Calculated R Axis -7 degrees    Calculated T Axis 75 degrees    Diagnosis       Normal sinus rhythm  Normal ECG  When compared with ECG of 02-JUL-2017 00:42,  MANUAL COMPARISON REQUIRED, DATA IS UNCONFIRMED         Medications reviewed  Current Facility-Administered Medications Medication Dose Route Frequency    ondansetron (ZOFRAN) injection 4 mg  4 mg IntraVENous NOW    sodium chloride (NS) flush 5-10 mL  5-10 mL IntraVENous Q8H    sodium chloride (NS) flush 5-10 mL  5-10 mL IntraVENous PRN    naloxone (NARCAN) injection 0.4 mg  0.4 mg IntraVENous PRN    levothyroxine (SYNTHROID) tablet 50 mcg  50 mcg Oral ACB    venlafaxine-SR (EFFEXOR-XR) capsule 225 mg  225 mg Oral DAILY    buPROPion XL (WELLBUTRIN XL) tablet 300 mg  300 mg Oral DAILY    cefTRIAXone (ROCEPHIN) 1 g in 0.9% sodium chloride (MBP/ADV) 50 mL  1 g IntraVENous Q24H    traMADol (ULTRAM) tablet 50 mg  50 mg Oral Q6H PRN    0.9% sodium chloride infusion  75 mL/hr IntraVENous CONTINUOUS    heparin (porcine) injection 5,000 Units  5,000 Units SubCUTAneous Buffy Benavides MD

## 2017-07-02 NOTE — PROGRESS NOTES
Bedside and Verbal shift change report given to Mariam Negrete (oncoming nurse) by Angel Sullivan (offgoing nurse). Report included the following information SBAR, Kardex, ED Summary and MAR.

## 2017-07-02 NOTE — ED PROVIDER NOTES
HPI Comments: 80 y.o. female with past medical history significant for HTN, Vertigo, Thromboembolus, PE, Hemolytic anemia, Arthritis, Asthma who presents from home accompanied by relatives for evaluation s/p GLF. Pt reports \"couple days\" hx of nausea and decreased appetite. She was seated in kitchen with relatives at which time she acutely felt nauseous and felt the urge to vomit. She ambulated towards the bathroom without her walker and sustained a GLF. Upon hitting the floor, pt vomited on herself. She c/o moderate low back pain exacerbated with any movement. She ate dinner and did not feel any better. Pt also has been c/o b/l knee pain. She has an appointment scheduled with her PCP for next week. She took 2 Tylenol 3 today for the pain. No LOC, numbness, new weakness, neck pain, or head injury. There are no other acute medical concerns at this time. Social hx: Ambulates with walker  PCP: Eliud Espinosa MD    Note written by Jannette Alberts, as dictated by Domenic Anderson MD 1:15 AM    The history is provided by the patient and a relative. No  was used. Past Medical History:   Diagnosis Date    Arthritis     Asthma     Autoimmune hemolytic anemia (HCC)     Depression     Heart murmur     Hypertension     Hypothyroidism     Pulmonary embolism (HCC)     Thromboembolus (HCC)     Vertigo        Past Surgical History:   Procedure Laterality Date    HX COLONOSCOPY      HX HEENT      HX HYSTERECTOMY      HX ORTHOPAEDIC  R total hip    HX REFRACTIVE SURGERY           No family history on file. Social History     Social History    Marital status:      Spouse name: N/A    Number of children: N/A    Years of education: N/A     Occupational History    Not on file.      Social History Main Topics    Smoking status: Never Smoker    Smokeless tobacco: Not on file    Alcohol use No    Drug use: No    Sexual activity: Not on file     Other Topics Concern    Not on file     Social History Narrative         ALLERGIES: Percocet [oxycodone-acetaminophen]; Pcn [penicillins]; and Sulfa (sulfonamide antibiotics)    Review of Systems   Constitutional: Positive for appetite change (decreased). Negative for fever. HENT: Negative for sore throat. Respiratory: Negative for shortness of breath. Cardiovascular: Negative for chest pain and leg swelling. Gastrointestinal: Positive for nausea and vomiting. Negative for abdominal pain. Genitourinary: Negative for difficulty urinating. Musculoskeletal: Positive for back pain (low). Negative for neck pain. Skin: Negative for rash. Neurological: Negative for headaches. All other systems reviewed and are negative. Vitals:    07/02/17 0020   BP: 161/81   Pulse: 92   Resp: 16   Temp: 97.5 °F (36.4 °C)   SpO2: 95%   Weight: 70.3 kg (155 lb)   Height: 5' 3\" (1.6 m)            Physical Exam   Constitutional: She appears well-developed and well-nourished. No distress. HENT:   Head: Normocephalic and atraumatic. Mouth/Throat: Oropharynx is clear and moist.   Eyes: Conjunctivae and EOM are normal.   Neck: Normal range of motion and phonation normal.   Cardiovascular: Normal rate and intact distal pulses. Pulmonary/Chest: Effort normal. No respiratory distress. Abdominal: Soft. She exhibits no distension. There is no tenderness. There is no rebound. Musculoskeletal: Normal range of motion. She exhibits no tenderness. Lumbar back: She exhibits pain. She exhibits no tenderness. Neurological: She is alert. She is not disoriented. She exhibits normal muscle tone. Skin: Skin is warm and dry. Nursing note and vitals reviewed. ED EKG interpretation:  Rhythm: normal sinus rhythm and 1st degree block; and regular . Rate (approx.): 88; Axis: normal; P wave: normal; QRS interval: normal ; ST/T wave: non-specific changes; Other findings: abnormal ekg.  This EKG was interpreted by Rasheeda Fletcher MD,ED Provider. [facility]  ED Course     2:17 AM  Patient admitted for acute renal failure. Discussed with Dr. Pascual Avalos, will admit.    Procedures

## 2017-07-02 NOTE — ED TRIAGE NOTES
Pt arrives with family with c/o of GLF, patient was on the way to the bathroom, felt nauseas fell and then vomited on her self as per daughter. Pt remembers falling, does not know why. Complains of back pain, nausea and headache. Does report not feeling well the last few days.

## 2017-07-02 NOTE — PROGRESS NOTES
Problem: Mobility Impaired (Adult and Pediatric)  Goal: *Acute Goals and Plan of Care (Insert Text)  Physical Therapy Goals  Initiated 7/2/2017  1. Patient will move from supine to sit and sit to supine in bed with moderate assistance within 7 day(s). 2. Patient will transfer from bed to chair and chair to bed with moderate assistance using the least restrictive device within 7 day(s). 3. Patient will perform sit to stand with moderate assistance within 7 day(s). 4. Patient will ambulate with moderate assistance for 50 feet with the least restrictive device within 7 day(s). 5. Patient will ascend/descend 3 stairs with double handrail(s) with moderate assistance within 7 day(s). PHYSICAL THERAPY EVALUATION  Patient: Sim Bradley [de-identified]80 y.o. female)  Date: 7/2/2017  Primary Diagnosis: NIMCO (acute kidney injury) [facility])  Precautions:  DNR, Fall      ASSESSMENT :  Based on the objective data described below, the patient presents with significant back pain, nausea, and incontinence of urine. Several family members at bedside, with heightened concern for medical issues and proposed discharge needs. Pt admitted late last night with another fall which xray confirms superior endplate depression , mild L-3. Per family members, steady decline in function over the past few weeks, with last witnessed fall about a month ago. Family had begun to seek out medical resources and specialist due to growing concern related to her medical history. She currently lives in mother in law suite at her granddaughter's home (she is , works, and has 3 children to care for as well). Patient's daughter is also present who currently providing caregiving for her father that lives with her. Two granddaughters, and daughter are present and asking relevant questions as it pertains to continued care for the patient at home with this presentation.  Encouraged them to continue to discuss ongoing concerns with therapy staff,  and physician this week. Pt presents with symptoms above which prohibited full mobility today with heightened irritability/ and labile periods. She was agreeable to proceed with mobility but limited by symptoms/pain. Pt is significantly below her functional baseline, and feel that she will require SNF placement for rehab once acute issues are resolved. Family at this time is not interested in LTC placement, but understands the need for increased rehab, personal care, and supervision/assistance with all mobility /ADL's at this time. Pt only able to tolerate dangle at bedside, sit to stand for 5 steps for repositioning with significant increase in pain, nausea. She required mod /max assist for safety due to the above. She requires 24/7 supervision and assistance at this time for safety due to increasing frequency of falls. Patient will benefit from skilled intervention to address the above impairments. Patients rehabilitation potential is considered to be Guarded  Factors which may influence rehabilitation potential include:   [ ]         None noted  [X]         Mental ability/status  [X]         Medical condition  [X]         Home/family situation and support systems  [X]         Safety awareness  [ ]         Pain tolerance/management  [ ]         Other:        PLAN :  Recommendations and Planned Interventions:  [X]           Bed Mobility Training             [ ]    Neuromuscular Re-Education  [X]           Transfer Training                   [ ]    Orthotic/Prosthetic Training  [X]           Gait Training                         [ ]    Modalities  [X]           Therapeutic Exercises           [ ]    Edema Management/Control  [X]           Therapeutic Activities            [X]    Patient and Family Training/Education  [ ]           Other (comment):     Frequency/Duration: Patient will be followed by physical therapy  5 times a week to address goals.   Discharge Recommendations: Cristo Lorenzo Equipment Recommendations for Discharge: TBD       SUBJECTIVE:   Patient stated Ministerio Browning is the happening to me. Every time I cough I pee! Emily Chisholm      OBJECTIVE DATA SUMMARY:   HISTORY:    Past Medical History:   Diagnosis Date    Arthritis      Asthma      Autoimmune hemolytic anemia (HCC)      Depression      Heart murmur      Hypertension      Hypothyroidism      Pulmonary embolism (HCC)      Thromboembolus (HCC)      Vertigo       Past Surgical History:   Procedure Laterality Date    HX COLONOSCOPY        HX HEENT        HX HYSTERECTOMY        HX ORTHOPAEDIC   R total hip    HX REFRACTIVE SURGERY         Prior Level of Function/Home Situation: Per family members, pt with a steady decline in functional independence requiring assistance with most activities of daily living. Despite use of walker, pt has exhibited an increase in falls with a reduction in UE/LE strength. Personal factors and/or comorbidities impacting plan of care: Supportive care by family is available, but not 24/7. Pt currently living in law suit independently, but has become more of a safety concern. Medical concerns include back pain, HTN, depression, and renal insufficiency. Home Situation  Home Environment: Private residence  # Steps to Enter: 0  One/Two Story Residence: One story  Living Alone: No  Support Systems: Child(meme), Family member(s)  Patient Expects to be Discharged to[de-identified] Skilled nursing facility  Current DME Used/Available at Home: royer Jimenez, [address] [facility] chair  Tub or Shower Type: Shower     EXAMINATION/PRESENTATION/DECISION MAKING:   Critical Behavior:  Neurologic State: Irritable, Confused, Alert  Orientation Level: Oriented to person, Oriented to place  Cognition: Follows commands, Impaired decision making  Safety/Judgement: Decreased insight into deficits, Decreased awareness of need for assistance  Hearing:   Auditory  Auditory Impairment: Hard of hearing, bilateral  Skin:  Intact, but incontinent of urine today with increased frequency of cough  Edema: none significant  Range Of Motion:  AROM: Generally decreased, functional   Strength:    Strength: Generally decreased, functional Early onset of fatigue however noted   Tone & Sensation:   Tone: Normal   Sensation: Intact   Coordination:  Coordination: Generally decreased, functional  Functional Mobility:  Bed Mobility:  Rolling: Moderate assistance;Assist x2; Additional time  Supine to Sit: Maximum assistance;Assist x1;Additional time  Sit to Supine: Maximum assistance;Assist x1;Additional time  Scooting: Moderate assistance;Assist x1  Transfers:  Sit to Stand: Minimum assistance;Assist x2  Stand to Sit: Moderate assistance;Assist x1  Balance:   Sitting: Intact; Without support  Standing: Impaired; With support  Standing - Static: Constant support;Poor  Standing - Dynamic : Poor  Ambulation/Gait Training:  Distance (ft): 8 Feet (ft)  Assistive Device: Gait belt;Walker, rolling  Ambulation - Level of Assistance: Moderate assistance;Assist x2; Additional time  Gait Description (WDL): Exceptions to WDL  Gait Abnormalities: Antalgic;Decreased step clearance; Step to gait  Base of Support: Widened  Stance: Right decreased; Left decreased;Weight shift  Speed/Hoa: Slow  Step Length: Left shortened;Right shortened              Stairs:Deferred     Functional Measure:  Barthel Index:      Bathin  Bladder: 0  Bowels: 5  Groomin  Dressin  Feedin  Mobility: 0  Stairs: 0  Toilet Use: 0  Transfer (Bed to Chair and Back): 0  Total: 15         Barthel and G-code impairment scale:  Percentage of impairment CH  0% CI  1-19% CJ  20-39% CK  40-59% CL  60-79% CM  80-99% CN  100%   Barthel Score 0-100 100 99-80 79-60 59-40 20-39 1-19    0   Barthel Score 0-20 20 17-19 13-16 9-12 5-8 1-4 0      The Barthel ADL Index: Guidelines  1. The index should be used as a record of what a patient does, not as a record of what a patient could do.   2. The main aim is to establish degree of independence from any help, physical or verbal, however minor and for whatever reason. 3. The need for supervision renders the patient not independent. 4. A patient's performance should be established using the best available evidence. Asking the patient, friends/relatives and nurses are the usual sources, but direct observation and common sense are also important. However direct testing is not needed. 5. Usually the patient's performance over the preceding 24-48 hours is important, but occasionally longer periods will be relevant. 6. Middle categories imply that the patient supplies over 50 per cent of the effort. 7. Use of aids to be independent is allowed. Marquita Ryder., Barthel, DMilanW. (6631). Functional evaluation: the Barthel Index. [facility] (14)2. Barbara Todd pietro ANDREW Tay, Vladimir Holguin., Fernanda Daniel., [facility], [facility] (1999). Measuring the change indisability after inpatient rehabilitation; comparison of the responsiveness of the Barthel Index and Functional Maryneal Measure. Journal of Neurology, Neurosurgery, and Psychiatry, 66(4), 024-986. Loy Fuentes, N.J.A, LYLA Rojas, & Cecilio Oliva, M.A. (2004.) Assessment of post-stroke quality of life in cost-effectiveness studies: The usefulness of the Barthel Index and the EuroQoL-5D. Quality of Life Research, 13, 262-10         G codes: In compliance with CMSs Claims Based Outcome Reporting, the following G-code set was chosen for this patient based on their primary functional limitation being treated: The outcome measure chosen to determine the severity of the functional limitation was the Barthel Index with a score of 15/100 which was correlated with the impairment scale.       · Mobility - Walking and Moving Around:               - CURRENT STATUS:    CM - 80%-99% impaired, limited or restricted               - GOAL STATUS:                           CL-60%-79% impaired, limited or restricted               - D/C STATUS:                       ---------------To be determined---------------      Physical Therapy Evaluation Charge Determination   History Examination Presentation Decision-Making   MEDIUM  Complexity : 1-2 comorbidities / personal factors will impact the outcome/ POC  MEDIUM Complexity : 3 Standardized tests and measures addressing body structure, function, activity limitation and / or participation in recreation  MEDIUM Complexity : Evolving with changing characteristics  HIGH Complexity : FOTO score of 1- 25       Based on the above components, the patient evaluation is determined to be of the following complexity level: MEDIUM     Pain:  Pain Scale 1: Numeric (0 - 10)  Pain Intensity 1: 8  Pain Location 1: Back  Pain Orientation 1: Posterior; Lower  Pain Description 1: Throbbing; Sharp  Pain Intervention(s) 1: Medication (see MAR); Emotional support;Repositioned  Activity Tolerance:   Poor tolerance for activity due to pain  Please refer to the flowsheet for vital signs taken during this treatment. After treatment:   [ ]         Patient left in no apparent distress sitting up in chair  [X]         Patient left  in bed  [X]         Call bell left within reach  [X]         Nursing notified - requested meds for nausea. Order to be requested  [X]         Caregiver present  [ ]         Bed alarm activated      COMMUNICATION/EDUCATION:   The patients plan of care was discussed with: Registered Nurse.  [X]         Fall prevention education was provided and the patient/caregiver indicated understanding. [X]         Patient/family have participated as able in goal setting and plan of care. [X]         Patient/family agree to work toward stated goals and plan of care. [ ]         Patient understands intent and goals of therapy, but is neutral about his/her participation. [ ]         Patient is unable to participate in goal setting and plan of care.      Thank you for this referral.  Ivett Hemphill, PT   Time Calculation: (P) 48 mins

## 2017-07-02 NOTE — ROUTINE PROCESS
Bedside shift change report given to Nisha Gómez RN (oncoming nurse) by John Hussein. Lyubov Fried   (offgoing nurse). Report included the following information SBAR, Kardex and MAR.

## 2017-07-02 NOTE — CONSULTS
Seen and examined. Suspect volume depletion ==> AKF but agree it is a dramatic increase in S creat for that. - will do screening serologies. - agree with U/S.   - IV fluids. - monitor closely. - may need biopsy.

## 2017-07-03 LAB
ALBUMIN SERPL BCP-MCNC: 3.2 G/DL (ref 3.5–5)
ALBUMIN SERPL ELPH-MCNC: 3.5 G/DL (ref 2.9–4.4)
ALBUMIN/GLOB SERPL: 0.9 {RATIO} (ref 1.1–2.2)
ALBUMIN/GLOB SERPL: 1.5 {RATIO} (ref 0.7–1.7)
ALP SERPL-CCNC: 110 U/L (ref 45–117)
ALPHA1 GLOB SERPL ELPH-MCNC: 0.2 G/DL (ref 0–0.4)
ALPHA2 GLOB SERPL ELPH-MCNC: 0.9 G/DL (ref 0.4–1)
ALT SERPL-CCNC: 18 U/L (ref 12–78)
ANION GAP BLD CALC-SCNC: 10 MMOL/L (ref 5–15)
AST SERPL W P-5'-P-CCNC: 20 U/L (ref 15–37)
ATRIAL RATE: 82 BPM
B-GLOBULIN SERPL ELPH-MCNC: 0.8 G/DL (ref 0.7–1.3)
BASOPHILS # BLD AUTO: 0 K/UL (ref 0–0.1)
BASOPHILS # BLD: 0 % (ref 0–1)
BILIRUB SERPL-MCNC: 0.3 MG/DL (ref 0.2–1)
BUN SERPL-MCNC: 39 MG/DL (ref 6–20)
BUN/CREAT SERPL: 10 (ref 12–20)
CALCIUM SERPL-MCNC: 8.3 MG/DL (ref 8.5–10.1)
CALCULATED P AXIS, ECG09: 18 DEGREES
CALCULATED R AXIS, ECG10: -4 DEGREES
CALCULATED T AXIS, ECG11: 73 DEGREES
CHLORIDE SERPL-SCNC: 106 MMOL/L (ref 97–108)
CO2 SERPL-SCNC: 23 MMOL/L (ref 21–32)
CREAT SERPL-MCNC: 3.78 MG/DL (ref 0.55–1.02)
DIAGNOSIS, 93000: NORMAL
EOSINOPHIL # BLD: 0.1 K/UL (ref 0–0.4)
EOSINOPHIL NFR BLD: 1 % (ref 0–7)
ERYTHROCYTE [DISTWIDTH] IN BLOOD BY AUTOMATED COUNT: 15.8 % (ref 11.5–14.5)
GAMMA GLOB SERPL ELPH-MCNC: 0.4 G/DL (ref 0.4–1.8)
GLOBULIN SER CALC-MCNC: 2.3 G/DL (ref 2.2–3.9)
GLOBULIN SER CALC-MCNC: 3.4 G/DL (ref 2–4)
GLUCOSE SERPL-MCNC: 86 MG/DL (ref 65–100)
HCT VFR BLD AUTO: 36.7 % (ref 35–47)
HGB BLD-MCNC: 12.1 G/DL (ref 11.5–16)
LYMPHOCYTES # BLD AUTO: 9 % (ref 12–49)
LYMPHOCYTES # BLD: 0.8 K/UL (ref 0.8–3.5)
M PROTEIN SERPL ELPH-MCNC: ABNORMAL G/DL
MAGNESIUM SERPL-MCNC: 1.5 MG/DL (ref 1.6–2.4)
MCH RBC QN AUTO: 28.7 PG (ref 26–34)
MCHC RBC AUTO-ENTMCNC: 33 G/DL (ref 30–36.5)
MCV RBC AUTO: 87 FL (ref 80–99)
MONOCYTES # BLD: 0.7 K/UL (ref 0–1)
MONOCYTES NFR BLD AUTO: 8 % (ref 5–13)
NEUTS SEG # BLD: 7.3 K/UL (ref 1.8–8)
NEUTS SEG NFR BLD AUTO: 82 % (ref 32–75)
P-R INTERVAL, ECG05: 192 MS
PLATELET # BLD AUTO: 216 K/UL (ref 150–400)
POTASSIUM SERPL-SCNC: 4.3 MMOL/L (ref 3.5–5.1)
PROT SERPL-MCNC: 5.8 G/DL (ref 6–8.5)
PROT SERPL-MCNC: 6.6 G/DL (ref 6.4–8.2)
Q-T INTERVAL, ECG07: 396 MS
QRS DURATION, ECG06: 106 MS
QTC CALCULATION (BEZET), ECG08: 462 MS
RBC # BLD AUTO: 4.22 M/UL (ref 3.8–5.2)
SODIUM SERPL-SCNC: 139 MMOL/L (ref 136–145)
TSH SERPL DL<=0.05 MIU/L-ACNC: 1.62 UIU/ML (ref 0.36–3.74)
VENTRICULAR RATE, ECG03: 82 BPM
WBC # BLD AUTO: 9 K/UL (ref 3.6–11)

## 2017-07-03 PROCEDURE — 83883 ASSAY NEPHELOMETRY NOT SPEC: CPT | Performed by: INTERNAL MEDICINE

## 2017-07-03 PROCEDURE — 74011250637 HC RX REV CODE- 250/637: Performed by: INTERNAL MEDICINE

## 2017-07-03 PROCEDURE — 85025 COMPLETE CBC W/AUTO DIFF WBC: CPT | Performed by: INTERNAL MEDICINE

## 2017-07-03 PROCEDURE — 80053 COMPREHEN METABOLIC PANEL: CPT | Performed by: INTERNAL MEDICINE

## 2017-07-03 PROCEDURE — 86160 COMPLEMENT ANTIGEN: CPT | Performed by: INTERNAL MEDICINE

## 2017-07-03 PROCEDURE — 36415 COLL VENOUS BLD VENIPUNCTURE: CPT | Performed by: INTERNAL MEDICINE

## 2017-07-03 PROCEDURE — 74011250637 HC RX REV CODE- 250/637: Performed by: PSYCHIATRY & NEUROLOGY

## 2017-07-03 PROCEDURE — 84443 ASSAY THYROID STIM HORMONE: CPT | Performed by: INTERNAL MEDICINE

## 2017-07-03 PROCEDURE — 97166 OT EVAL MOD COMPLEX 45 MIN: CPT | Performed by: OCCUPATIONAL THERAPIST

## 2017-07-03 PROCEDURE — 86803 HEPATITIS C AB TEST: CPT | Performed by: INTERNAL MEDICINE

## 2017-07-03 PROCEDURE — 97535 SELF CARE MNGMENT TRAINING: CPT | Performed by: OCCUPATIONAL THERAPIST

## 2017-07-03 PROCEDURE — 74011250636 HC RX REV CODE- 250/636: Performed by: PSYCHIATRY & NEUROLOGY

## 2017-07-03 PROCEDURE — 74011250636 HC RX REV CODE- 250/636: Performed by: FAMILY MEDICINE

## 2017-07-03 PROCEDURE — 65660000000 HC RM CCU STEPDOWN

## 2017-07-03 PROCEDURE — 74011000258 HC RX REV CODE- 258: Performed by: INTERNAL MEDICINE

## 2017-07-03 PROCEDURE — 74011250636 HC RX REV CODE- 250/636: Performed by: INTERNAL MEDICINE

## 2017-07-03 PROCEDURE — 83735 ASSAY OF MAGNESIUM: CPT | Performed by: INTERNAL MEDICINE

## 2017-07-03 RX ORDER — HALOPERIDOL 5 MG/ML
1 INJECTION INTRAMUSCULAR
Status: COMPLETED | OUTPATIENT
Start: 2017-07-03 | End: 2017-07-03

## 2017-07-03 RX ORDER — HALOPERIDOL 5 MG/ML
1 INJECTION INTRAMUSCULAR
Status: DISCONTINUED | OUTPATIENT
Start: 2017-07-03 | End: 2017-07-11 | Stop reason: HOSPADM

## 2017-07-03 RX ORDER — TRAZODONE HYDROCHLORIDE 50 MG/1
25 TABLET ORAL
Status: DISCONTINUED | OUTPATIENT
Start: 2017-07-03 | End: 2017-07-11 | Stop reason: HOSPADM

## 2017-07-03 RX ORDER — BUPROPION HYDROCHLORIDE 150 MG/1
150 TABLET ORAL DAILY
Status: DISCONTINUED | OUTPATIENT
Start: 2017-07-04 | End: 2017-07-11 | Stop reason: HOSPADM

## 2017-07-03 RX ADMIN — SODIUM CHLORIDE 75 ML/HR: 900 INJECTION, SOLUTION INTRAVENOUS at 13:46

## 2017-07-03 RX ADMIN — HEPARIN SODIUM 5000 UNITS: 5000 INJECTION, SOLUTION INTRAVENOUS; SUBCUTANEOUS at 13:51

## 2017-07-03 RX ADMIN — HALOPERIDOL LACTATE 1 MG: 5 INJECTION, SOLUTION INTRAMUSCULAR at 22:30

## 2017-07-03 RX ADMIN — HALOPERIDOL LACTATE 1 MG: 5 INJECTION, SOLUTION INTRAMUSCULAR at 16:00

## 2017-07-03 RX ADMIN — TRAMADOL HYDROCHLORIDE 50 MG: 50 TABLET, FILM COATED ORAL at 01:06

## 2017-07-03 RX ADMIN — TRAZODONE HYDROCHLORIDE 25 MG: 50 TABLET ORAL at 21:42

## 2017-07-03 RX ADMIN — VENLAFAXINE HYDROCHLORIDE 225 MG: 150 CAPSULE, EXTENDED RELEASE ORAL at 10:34

## 2017-07-03 RX ADMIN — TRAMADOL HYDROCHLORIDE 50 MG: 50 TABLET, FILM COATED ORAL at 11:21

## 2017-07-03 RX ADMIN — BUPROPION HYDROCHLORIDE 300 MG: 150 TABLET, FILM COATED, EXTENDED RELEASE ORAL at 10:34

## 2017-07-03 RX ADMIN — Medication 10 ML: at 21:43

## 2017-07-03 RX ADMIN — Medication 10 ML: at 13:46

## 2017-07-03 RX ADMIN — LEVOTHYROXINE SODIUM 50 MCG: 50 TABLET ORAL at 10:34

## 2017-07-03 RX ADMIN — CEFTRIAXONE SODIUM 1 G: 1 INJECTION, POWDER, FOR SOLUTION INTRAMUSCULAR; INTRAVENOUS at 10:46

## 2017-07-03 NOTE — PROGRESS NOTES
Bedside shift change report given to Lizzeth Mckeon RN by Alirio Minor RN. Report included the following information SBAR, Kardex, Intake/Output and MAR.     0720 Received patient with no IV access and no AM labs done. Per Patricia Mcgill RN patient refused for IV placement and AM labs, MD not aware. 0900 Patient currently confused and thinks she is not in the hospital.  Patient refusing to have an IV started, blood drawn and to take PO meds. Will notify MD.   7639 Patient refusing to stay in room, at nurses station yelling at staff. Patient believes she is at her house and doesn't want anything \"done\" to her such as IV sticks. Code Oscar called. Called Dr. Favian Dutta and was advised. Telephone order received for psych consult, no further orders received. 0930 Called patient's granddaughter, Gracia Price, she is on the way to hospital and will take her 20-30 minutes. 1010 Patient's family at bedside. Patient willing to go back to room and is becoming more oriented to where she is and the reason she is here. 1020 Patient resting comfortably in recliner with family with her. Patient willing to take PO meds, start IV/obtain blood work.

## 2017-07-03 NOTE — CDMP QUERY
1. Please clarify if this patient is being treated/managed for:    =>Dementia with Behavioral disturbance in the setting of increased agitation Refusing IV placement, labs, yelling at staff, confused; Neuro consult; redirect; Granddaughter to stay with patient  Or  => Acute delirium in the setting of agitation  =>Other Explanation of clinical findings  =>Unable to Determine (no explanation of clinical findings)    The medical record reflects the following clinical findings, treatment, and risk factors:    Risk Factors: [facility] dementia    Clinical Indicators: 7/3 NN: patient confused, refusing IV placement, labs refusing to stay in room, yelling at staff    Treatment: Neuro consult; increased safety level, redirect    Please clarify and document your clinical opinion in the progress notes and discharge summary including the definitive and/or presumptive diagnosis, (suspected or probable), related to the above clinical findings. Please include clinical findings supporting your diagnosis.     Thank you,  Juan Miguel Carter, MSN, [address] [place] [address]

## 2017-07-03 NOTE — PROGRESS NOTES
PHYSICAL THERAPY:Events of this AM noted. PT will defer tx this afternoon and follow next treatment day. Continue goals.

## 2017-07-03 NOTE — PROGRESS NOTES
Daily Progress Note: 7/3/2017  Maria Fernanda Piper-Kaylan Raya MD    Assessment/Plan:   NIMCO: unclear etiology. Has mild IVVD  --renal labs pending  --will check renal US  -- nephrology consulted      Leukocytosis: unclear etiology. No fevers.  Inflammatory vs infectious  -- start IV CTX for UTI follow urine culture    Back pain - superior endplate depression, mild L3  --pain control - consider ortho consult       HTN: watch  -- not on meds       Depression  -- cont Effexor and Wellbutrin       Hypothyroidism  -- cont LT4; check TSH       History of PE: not on anticoagulation  -- DVT ppx on heparin SQ        Weakness / Fall: lives with grand-daughter   -- PT / OT, fall precautions    Dementia - chronic - waxes and wanes - followed by Dr Kirill Maier (Neuro)       Problem List:  Problem List as of 7/3/2017  Date Reviewed: 7/2/2017          Codes Class Noted - Resolved    UTI (urinary tract infection) ICD-10-CM: N39.0  ICD-9-CM: 599.0  7/2/2017 - Present        Acute respiratory failure ([facility] ICD-10-CM: J96.00  ICD-9-CM: 518.81  12/21/2014 - Present        PE (pulmonary embolism) ICD-10-CM: I26.99  ICD-9-CM: 415.19  12/11/2014 - Present        NIMCO (acute kidney injury) ([facility] ICD-10-CM: N17.9  ICD-9-CM: 584.9  12/11/2014 - Present        Renal lesion ICD-10-CM: N28.9  ICD-9-CM: 593.9  11/5/2014 - Present        Autoimmune hemolytic anemia ([facility] ICD-10-CM: D59.1  ICD-9-CM: 283.0  11/5/2014 - Present        Leukocytosis ICD-10-CM: N18.748  ICD-9-CM: 288.60  10/25/2014 - Present        HTN (hypertension) ICD-10-CM: I10  ICD-9-CM: 401.9  10/19/2014 - Present        Depression ICD-10-CM: F32.9  ICD-9-CM: 643  10/19/2014 - Present        Hypothyroidism ICD-10-CM: E03.9  ICD-9-CM: 244.9  10/19/2014 - Present        Diarrhea ICD-10-CM: R19.7  ICD-9-CM: 787.91  10/19/2014 - Present        Leukopenia ICD-10-CM: D72.819  ICD-9-CM: 288.50  10/19/2014 - Present        Anemia ICD-10-CM: D64.9  ICD-9-CM: 285.9  10/19/2014 - Present        Transaminitis ICD-10-CM: R74.0  ICD-9-CM: 790.4  10/19/2014 - Present        Hyperbilirubinemia ICD-10-CM: E80.6  ICD-9-CM: 782.4  10/19/2014 - Present        Weakness ICD-10-CM: R53.1  ICD-9-CM: 780.79  10/18/2014 - Present              Subjective:   H&P: Ms. Richard George is a 80 y.o. female w/ hx of HTN, hypothyroidism, depression, prior PE who presents with low back pain after fall. Per family at bedside, pt has been very weak in recent weeks, intermittent, moderate, worsening, associated with falling at home. Yesterday, pt was walking to bathroom and fell to the floor on her back. Now with severe back pain. No weakness or numbness. No fevers/chills, cough, CP, SOB. Reports nausea. ED workup showed severe NIMCO, leukocytosis. :  Patient c/o at least a week of nausea, not eating much. No abd. Pain, bowels have been OK. She denies dysuria. She has had chronic back pain, and now worse after her fall. This AM she still is with back pain but improved, she just ate some breakfast and feels like it will probably stay down. 7/3:  Nausea is some better. She wants to see ortho for her back pain. Creat at 3 yesterday. AM labs pending. Nurses report she is not letting them draw labs or start IV. Sees Neuro outpt for dementia. Review of Systems:   A comprehensive review of systems was negative except for that written in the HPI. Objective:   Physical Exam:     Visit Vitals    /67 (BP 1 Location: Left arm, BP Patient Position: At rest)    Pulse 88    Temp 98.5 °F (36.9 °C)    Resp 18    Ht 5' 3\" (1.6 m)    Wt 70.3 kg (155 lb)    SpO2 95%    Breastfeeding No    BMI 27.46 kg/m2      O2 Device: Room air    Temp (24hrs), Av.2 °F (36.8 °C), Min:97.8 °F (36.6 °C), Max:98.6 °F (37 °C)         1901 -  0700  In: 601 [P.O.:120; I.V.:825]  Out: -     General:  Alert, cooperative, no distress, appears stated age. Head:  Normocephalic, without obvious abnormality, atraumatic. Eyes:  Conjunctivae/corneas clear. PERRL, EOMs intact. Nose: Nares normal. Septum midline. Mucosa normal. No drainage or sinus tenderness. Throat: Lips, mucosa, and tongue moist..   Neck: Supple, symmetrical, trachea midline   Lungs:   Clear to auscultation bilaterally. Heart:  Regular rate and rhythm, S1, S2 normal, no murmur, click, rub or gallop. Abdomen:   Soft, non-tender. Bowel sounds normal. No masses,  No organomegaly. Extremities: Extremities normal, atraumatic, no cyanosis or edema. No calf tenderness or cords. Pulses: 2+ and symmetric all extremities. Skin: Skin color, texture, turgor normal. No rashes or lesions   Neurologic: CNII-XII intact. Alert and oriented X 2 today - waxes and wanes. Fine motor of hands and fingers normal.      Data Review:       Recent Days:  Recent Labs      07/02/17   0058   WBC  15.2*   HGB  13.9   HCT  40.8   PLT  252     Recent Labs      07/02/17 0058   NA  137   K  4.3   CL  102   CO2  20*   GLU  124*   BUN  44*   CREA  3.98*   CA  9.3   ALB  3.6   SGOT  21   ALT  22     No results for input(s): PH, PCO2, PO2, HCO3, FIO2 in the last 72 hours. 24 Hour Results:  No results found for this or any previous visit (from the past 24 hour(s)).     Medications reviewed  Current Facility-Administered Medications   Medication Dose Route Frequency    sodium chloride (NS) flush 5-10 mL  5-10 mL IntraVENous Q8H    sodium chloride (NS) flush 5-10 mL  5-10 mL IntraVENous PRN    naloxone (NARCAN) injection 0.4 mg  0.4 mg IntraVENous PRN    levothyroxine (SYNTHROID) tablet 50 mcg  50 mcg Oral ACB    venlafaxine-SR (EFFEXOR-XR) capsule 225 mg  225 mg Oral DAILY    buPROPion XL (WELLBUTRIN XL) tablet 300 mg  300 mg Oral DAILY    cefTRIAXone (ROCEPHIN) 1 g in 0.9% sodium chloride (MBP/ADV) 50 mL  1 g IntraVENous Q24H    traMADol (ULTRAM) tablet 50 mg  50 mg Oral Q6H PRN    0.9% sodium chloride infusion  75 mL/hr IntraVENous CONTINUOUS    heparin (porcine) injection 5,000 Units  5,000 Units SubCUTAneous Q8H    ondansetron (ZOFRAN) injection 4 mg  4 mg IntraVENous Q4H PRN         Mili Sheppard MD

## 2017-07-03 NOTE — PROGRESS NOTES
Problem: Self Care Deficits Care Plan (Adult)  Goal: *Acute Goals and Plan of Care (Insert Text)  Occupational Therapy Goals  Initiated 7/3/2017  1. Patient will perform grooming with supervision/set-up sitting EOB within 7 day(s). 4. Patient will perform toilet transfers with moderate assistance to BCS within 7 day(s). 5. Patient will perform all aspects of toileting with moderate assistance within 7 day(s). 6. Patient will participate in upper extremity therapeutic exercise/activities with supervision/set-up for 10 minutes within 7 day(s). OCCUPATIONAL THERAPY EVALUATION  Patient: Meera Bradley [de-identified]80 y.o. female)  Date: 7/3/2017  Primary Diagnosis: NIMCO (acute kidney injury) [facility])        Precautions:   DNR, Fall      ASSESSMENT :  Based on the objective data described below, the patient presents with diminished strength, endurance, and balance, increased confusion and significant pain in back and LEs after admission for NIMCO. Patient lives in a mother-in-law suite at her [facility]. Family reports pt requires A with bathing, dressing, and meal preparation and uses a RW for functional mobility. Pt's last known fall was last month. She is alone for portions of the day at her [facility]. Pt currently requires total A for LB dressing and toileting. Pt sat in chair to wash face with set/up and verbal cueing. Attempted to stand from chair with RW, rai to squat with mod A but pt began complaining of significant pain in back and returned to sitting. Due to pt's cognitive status and poor balance, she presents as a fall risk. Spoke with pt's family, family is concerned about their ability to provide adequate care and safety for pt at home and they are open to exploring rehab options. Pt will benefit from continued OT to improve functional performance. Recommend rehab. Patient will benefit from skilled intervention to address the above impairments.   Patients rehabilitation potential is considered to be Fair  Factors which may influence rehabilitation potential include:   [ ]             None noted  [X]             Mental ability/status  [ ]             Medical condition  [ ]             Home/family situation and support systems  [ ]             Safety awareness  [ ]             Pain tolerance/management  [ ]             Other:        PLAN :  Recommendations and Planned Interventions:  [X]               Self Care Training                  [X]        Therapeutic Activities  [X]               Functional Mobility Training    [ ]        Cognitive Retraining  [X]               Therapeutic Exercises           [X]        Endurance Activities  [X]               Balance Training                   [ ]        Neuromuscular Re-Education  [ ]               Visual/Perceptual Training     [X]   Home Safety Training  [X]               Patient Education                 [ ]        Family Training/Education  [ ]               Other (comment):     Frequency/Duration: Patient will be followed by occupational therapy 3 times a week to address goals. Discharge Recommendations: Rehab  Further Equipment Recommendations for Discharge: TBD       SUBJECTIVE:   Patient stated I don't think I can do much right now.       OBJECTIVE DATA SUMMARY:   HISTORY:   Past Medical History:   Diagnosis Date    Arthritis      Asthma      Autoimmune hemolytic anemia (HCC)      Depression      Heart murmur      Hypertension      Hypothyroidism      Pulmonary embolism (HCC)      Thromboembolus (HCC)      Vertigo       Past Surgical History:   Procedure Laterality Date    HX COLONOSCOPY        HX HEENT        HX HYSTERECTOMY        HX ORTHOPAEDIC   R total hip    HX REFRACTIVE SURGERY            Prior Level of Function/Home Situation: Family reports pt requires A with bathing, dressing, and meal preparation and uses a RW for functional mobility. Pt's last known fall was last month.  She is alone for portions of the day at her [facility]. Expanded or extensive additional review of patient history:      Home Situation  Home Environment: Private residence  # Steps to Enter: 0  One/Two Story Residence: One story  Living Alone: No  Support Systems: Family member(s) (lives in mother-in-law suite with granddaughter)  Patient Expects to be Discharged to[de-identified] Skilled nursing facility  Current DME Used/Available at Home: Nile Marlow, rolling, Shower chair  Tub or Shower Type: Shower  [X]  Right hand dominant             [ ]  Left hand dominant     EXAMINATION OF PERFORMANCE DEFICITS:  Cognitive/Behavioral Status:  Neurologic State: Alert  Orientation Level: Oriented to person;Oriented to time  Cognition: Decreased attention/concentration; Follows commands;Poor safety awareness  Perception: Appears intact  Perseveration: No perseveration noted  Safety/Judgement: Decreased awareness of environment;Decreased awareness of need for assistance;Decreased awareness of need for safety; Fall prevention;Home safety     Hearing: Auditory  Auditory Impairment: Hard of hearing, left side     Vision/Perceptual:         Acuity: Able to read clock/calendar on wall without difficulty    Corrective Lenses: Reading glasses     Range of Motion:     AROM: Generally decreased, functional        Strength:     Strength: Generally decreased, functional        Coordination:  Coordination: Generally decreased, functional  Fine Motor Skills-Upper: Left Intact; Right Intact    Gross Motor Skills-Upper: Left Intact; Right Intact     Tone & Sensation:     Tone: Normal  Sensation: Intact        Balance:  Sitting: Intact; With support     Functional Mobility and Transfers for ADLs:  Bed Mobility:        Transfers:  Sit to Stand: Moderate assistance (with RW to squat position)  Stand to Sit: Minimum assistance (from RW)     ADL Assessment:  Feeding: Setup;Supervision     Oral Facial Hygiene/Grooming: Setup;Supervision     Bathing: Maximum assistance; Additional time     Upper Body Dressing: Maximum assistance     Lower Body Dressing: Total assistance; Additional time     Toileting: Total assistance        ADL Intervention and task modifications:    Pt sat in chair to wash face with set/up and verbal cueing. Attempted to stand from chair with RW, rai to squat with mod A but pt began complaining of significant pain in back and returned to sitting. Grooming  Grooming Assistance: Supervision/set up  Washing Face: Supervision/set-up (sitting in chair)  Cues: Verbal cues provided        Cognitive Retraining  Safety/Judgement: Decreased awareness of environment;Decreased awareness of need for assistance;Decreased awareness of need for safety; Fall prevention;Home safety     Functional Measure:  Barthel Index:      Bathin  Bladder: 0  Bowels: 5  Groomin  Dressin  Feedin  Mobility: 0  Stairs: 0  Toilet Use: 0  Transfer (Bed to Chair and Back): 5  Total: 15         Barthel and G-code impairment scale:  Percentage of impairment CH  0% CI  1-19% CJ  20-39% CK  40-59% CL  60-79% CM  80-99% CN  100%   Barthel Score 0-100 100 99-80 79-60 59-40 20-39 1-19    0   Barthel Score 0-20 20 17-19 13-16 9-12 5-8 1-4 0      The Barthel ADL Index: Guidelines  1. The index should be used as a record of what a patient does, not as a record of what a patient could do. 2. The main aim is to establish degree of independence from any help, physical or verbal, however minor and for whatever reason. 3. The need for supervision renders the patient not independent. 4. A patient's performance should be established using the best available evidence. Asking the patient, friends/relatives and nurses are the usual sources, but direct observation and common sense are also important. However direct testing is not needed. 5. Usually the patient's performance over the preceding 24-48 hours is important, but occasionally longer periods will be relevant.   6. Middle categories imply that the patient supplies over 50 per cent of the effort. 7. Use of aids to be independent is allowed. Shana Holt., Barthel, D.W. (1171). Functional evaluation: the Barthel Index. [address] (14)2. ANDREW Jamison, Alonso Sutton., Kenny Zelaya., Francis, [address] (1999). Measuring the change indisability after inpatient rehabilitation; comparison of the responsiveness of the Barthel Index and Functional [place] Measure. Journal of Neurology, Neurosurgery, and Psychiatry, 66(4), 406-162. KRIS Saez Cap, LYLA Rojas, & Yudelka Diallo M.A. (2004.) Assessment of post-stroke quality of life in cost-effectiveness studies: The usefulness of the Barthel Index and the EuroQoL-5D. Quality of Life Research, 13, 167-58         G codes: In compliance with CMSs Claims Based Outcome Reporting, the following G-code set was chosen for this patient based on their primary functional limitation being treated: The outcome measure chosen to determine the severity of the functional limitation was the Barthel Index with a score of 15/100 which was correlated with the impairment scale. · Self Care:               - CURRENT STATUS:    CM - 80%-99% impaired, limited or restricted               - GOAL STATUS:           CK - 40%-59% impaired, limited or restricted               - D/C STATUS:                       CK - 40%-59% impaired, limited or restricted      Occupational Therapy Evaluation Charge Determination   History Examination Decision-Making   MEDIUM Complexity : Expanded review of history including physical, cognitive and psychosocial  history  MEDIUM Complexity : 3-5 performance deficits relating to physical, cognitive , or psychosocial skils that result in activity limitations and / or participation restrictions HIGH Complexity : Patient presents with comorbidities that affect occupational performance.  Signifigant modification of tasks or assistance (eg, physical or verbal) with assessment (s) is necessary to enable patient to complete evaluation       Based on the above components, the patient evaluation is determined to be of the following complexity level: MEDIUM  Pain:  Pain Scale 1: Numeric (0 - 10)  Pain Intensity 1: 9  Pain Location 1: Back  Pain Orientation 1: Lower  Pain Description 1: Aching  Pain Intervention(s) 1: Medication (see MAR)  Activity Tolerance:   Fair  Please refer to the flowsheet for vital signs taken during this treatment. After treatment:   [X] Patient left in no apparent distress sitting up in chair  [ ] Patient left in no apparent distress in bed  [X] Call bell left within reach  [X] Nursing notified  [X] Caregiver present- son and daughter in law   [ ] Bed alarm activated      COMMUNICATION/EDUCATION:   The patients plan of care was discussed with: Registered Nurse and Orthopedic NP.  [ ] Home safety education was provided and the patient/caregiver indicated understanding. [ ] Patient/family have participated as able in goal setting and plan of care. [ ] Patient/family agree to work toward stated goals and plan of care. [ ] Patient understands intent and goals of therapy, but is neutral about his/her participation. [ ] Patient is unable to participate in goal setting and plan of care. This patients plan of care is not appropriate for delegation to [facility].      Thank you for this referral.  Jo Ann Dougherty, OT  Time Calculation: 23 mins

## 2017-07-03 NOTE — CONSULTS
Orthopedic History and Physical     Patient: Oli De La O MRN: [id number]  SSN: [id number]    YOB: 1932  Age: 80 y.o. Sex: female          Subjective:     Patient is a 80 y.o.  female who complains of generalized back pain. Pt with hx of HTN, Vertigo, Thromboembolus, PE, Hemolytic anemia, Arthritis, Asthma who presents from home accompanied by relatives for evaluation s/p GLF. Pt reports \"couple days\" hx of nausea and decreased appetite  Pt admitted for UTI/ NIMCO. Pt family states she fell over the weekend, patient does not recall. Unaware of how she landed. CT abd/ pelvis reveal end plate fx of L3, age indeterminate. Pt complains of pain across her entire lumbar spine. No radicular pain. No bowel or bladder dysfunction. Patient Active Problem List    Diagnosis Date Noted    UTI (urinary tract infection) 07/02/2017    Acute respiratory failure ([address] 12/21/2014    PE (pulmonary embolism) 12/11/2014    NIMCO (acute kidney injury) ([address] 12/11/2014    Renal lesion 11/05/2014    Autoimmune hemolytic anemia [facility] 11/05/2014    Leukocytosis 10/25/2014    HTN (hypertension) 10/19/2014    Depression 10/19/2014    Hypothyroidism 10/19/2014    Diarrhea 10/19/2014    Leukopenia 10/19/2014    Anemia 10/19/2014    Transaminitis 10/19/2014    Hyperbilirubinemia 10/19/2014    Weakness 10/18/2014     Past Medical History:   Diagnosis Date    Arthritis     Asthma     Autoimmune hemolytic anemia (HCC)     Depression     Heart murmur     Hypertension     Hypothyroidism     Pulmonary embolism (HCC)     Thromboembolus (HCC)     Vertigo       Past Surgical History:   Procedure Laterality Date    HX COLONOSCOPY      HX HEENT      HX HYSTERECTOMY      HX ORTHOPAEDIC  R total hip    HX REFRACTIVE SURGERY        Prior to Admission medications    Medication Sig Start Date End Date Taking?  Authorizing Provider   gabapentin (NEURONTIN) 100 mg capsule Take 100 mg by mouth three (3) times daily. Yes Melo Raya MD   acetaminophen-codeine (TYLENOL #2) 300-15 mg per tablet Take 1 Tab by mouth every four (4) hours as needed for Pain. Yes Melo Raya MD   buPROPion XL (WELLBUTRIN XL) 300 mg XL tablet Take 300 mg by mouth every morning. Yes Historical Provider   venlafaxine-SR [facility] 75 mg capsule Take 75 mg by mouth daily. In addition to 150 mg daily. Total 225 mg daily. Yes Historical Provider   venlafaxine-SR (EFFEXOR XR) 150 mg capsule Take 150 mg by mouth daily. In addition to 75 mg daily. Total 225 mg daily. Yes Melo Raya MD   levothyroxine (LEVOTHROID) 50 mcg tablet Take 50 mcg by mouth Daily (before breakfast). Yes Historical Provider   albuterol (PROAIR HFA) 90 mcg/actuation inhaler Take 2 puffs by inhalation every four (4) hours as needed. Historical Provider   CARBAMIDE PEROXIDE (EAR WAX DROPS OT) 1 drop by Otic route daily as needed.     Historical Provider     Current Facility-Administered Medications   Medication Dose Route Frequency    [START ON 7/4/2017] buPROPion XL (WELLBUTRIN XL) tablet 150 mg  150 mg Oral DAILY    traZODone (DESYREL) tablet 25 mg  25 mg Oral QHS    haloperidol lactate (HALDOL) injection 1 mg  1 mg IntraVENous Q6H PRN    haloperidol lactate (HALDOL) injection 1 mg  1 mg IntraMUSCular Q6H PRN    sodium chloride (NS) flush 5-10 mL  5-10 mL IntraVENous Q8H    sodium chloride (NS) flush 5-10 mL  5-10 mL IntraVENous PRN    naloxone (NARCAN) injection 0.4 mg  0.4 mg IntraVENous PRN    levothyroxine (SYNTHROID) tablet 50 mcg  50 mcg Oral ACB    venlafaxine-SR (EFFEXOR-XR) capsule 225 mg  225 mg Oral DAILY    cefTRIAXone (ROCEPHIN) 1 g in 0.9% sodium chloride (MBP/ADV) 50 mL  1 g IntraVENous Q24H    traMADol (ULTRAM) tablet 50 mg  50 mg Oral Q6H PRN    0.9% sodium chloride infusion  75 mL/hr IntraVENous CONTINUOUS    heparin (porcine) injection 5,000 Units  5,000 Units SubCUTAneous Q8H    ondansetron (ZOFRAN) injection 4 mg  4 mg IntraVENous Q4H PRN      Allergies   Allergen Reactions    Percocet [Oxycodone-Acetaminophen] Anxiety     Anxiety, shaking     Pcn [Penicillins] Rash     Patient states this happened ~ 40 years ago  Tolerates ceftriaxone    Sulfa (Sulfonamide Antibiotics) Itching and Vertigo      Social History   Substance Use Topics    Smoking status: Never Smoker    Smokeless tobacco: Not on file    Alcohol use No      No family history on file. Review of Systems  A comprehensive review of systems was negative except for that written in the HPI. Objective:     No data found. Temp (24hrs), Av.4 °F (36.9 °C), Min:98 °F (36.7 °C), Max:98.6 °F (37 °C)      EXAM:   Physical Exam:   Patient appears generally well, mood and affect are appropriate and pleasant. Extremities: Palpated along entire cervical, thoracic, lumbar spine. No point tenderness to spinal process. No erythema. No fluctuance. 5/5 strength throughout bilaterally extremities. DF, PF, KE, KF, EHL, FHL. PP +2  Vascular: 2+ dorsalis pedis pulses bilaterally. Imaging Review    Imaging Review: INDICATION: back pain after fall     EXAM: Lumbar spine radiographs, 3 views.     COMPARISON: CT abdomen and pelvis 2014. .     FINDINGS: A three-view examination of the lumbar spine reveals 5 mm  anterolisthesis at L5-S1, stable since the prior examination 2014. Bone  mineral content is normal for age . There is no obvious acute fracture or  dislocation. Vertebral body height is very minimally diminished at L3, with  superior endplate depression which is new since the prior study. Superior  endplate depression at L1 is stable and old. Disk space height is diminished at  L5-S1, where there is endplate sclerosis. .  Pedicles and sacroiliac joints are  intact, as are visualized sacral foramina. Vascular calcification is heavy, with  distal abdominal aortic ectasia to approximately 2.9 cm.  Incidentally noted  metallic filter projecting over the IVC. Incidentally noted right hip  arthroplasty     IMPRESSION  IMPRESSION:  1. Superior endplate depression, mild, at L3, age undetermined but new since the  prior study 12/23/2014. This may be acute or subacute. 2. Stable old L1 superior endplate compression. 3. Distal abdominal aortic ectasia. 4. Stable degenerative disc disease at L5-S1. Labs:   Recent Results (from the past 12 hour(s))   METABOLIC PANEL, COMPREHENSIVE    Collection Time: 07/03/17 10:35 AM   Result Value Ref Range    Sodium 139 136 - 145 mmol/L    Potassium 4.3 3.5 - 5.1 mmol/L    Chloride 106 97 - 108 mmol/L    CO2 23 21 - 32 mmol/L    Anion gap 10 5 - 15 mmol/L    Glucose 86 65 - 100 mg/dL    BUN 39 (H) 6 - 20 MG/DL    Creatinine 3.78 (H) 0.55 - 1.02 MG/DL    BUN/Creatinine ratio 10 (L) 12 - 20      GFR est AA 14 (L) >60 ml/min/1.73m2    GFR est non-AA 11 (L) >60 ml/min/1.73m2    Calcium 8.3 (L) 8.5 - 10.1 MG/DL    Bilirubin, total 0.3 0.2 - 1.0 MG/DL    ALT (SGPT) 18 12 - 78 U/L    AST (SGOT) 20 15 - 37 U/L    Alk. phosphatase 110 45 - 117 U/L    Protein, total 6.6 6.4 - 8.2 g/dL    Albumin 3.2 (L) 3.5 - 5.0 g/dL    Globulin 3.4 2.0 - 4.0 g/dL    A-G Ratio 0.9 (L) 1.1 - 2.2     CBC WITH AUTOMATED DIFF    Collection Time: 07/03/17 10:35 AM   Result Value Ref Range    WBC 9.0 3.6 - 11.0 K/uL    RBC 4.22 3.80 - 5.20 M/uL    HGB 12.1 11.5 - 16.0 g/dL    HCT 36.7 35.0 - 47.0 %    MCV 87.0 80.0 - 99.0 FL    MCH 28.7 26.0 - 34.0 PG    MCHC 33.0 30.0 - 36.5 g/dL    RDW 15.8 (H) 11.5 - 14.5 %    PLATELET 623 933 - 069 K/uL    NEUTROPHILS 82 (H) 32 - 75 %    LYMPHOCYTES 9 (L) 12 - 49 %    MONOCYTES 8 5 - 13 %    EOSINOPHILS 1 0 - 7 %    BASOPHILS 0 0 - 1 %    ABS. NEUTROPHILS 7.3 1.8 - 8.0 K/UL    ABS. LYMPHOCYTES 0.8 0.8 - 3.5 K/UL    ABS. MONOCYTES 0.7 0.0 - 1.0 K/UL    ABS. EOSINOPHILS 0.1 0.0 - 0.4 K/UL    ABS.  BASOPHILS 0.0 0.0 - 0.1 K/UL   MAGNESIUM    Collection Time: 07/03/17 10:35 AM   Result Value Ref Range Magnesium 1.5 (L) 1.6 - 2.4 mg/dL   TSH 3RD GENERATION    Collection Time: 07/03/17 10:35 AM   Result Value Ref Range    TSH 1.62 0.36 - 3.74 uIU/mL       Assessment:     Patient Active Problem List    Diagnosis Date Noted    UTI (urinary tract infection) 07/02/2017    Acute respiratory failure ([facility] 12/21/2014    PE (pulmonary embolism) 12/11/2014    NIMCO (acute kidney injury) ([facility] 12/11/2014    Renal lesion 11/05/2014    Autoimmune hemolytic anemia ([facility] 11/05/2014    Leukocytosis 10/25/2014    HTN (hypertension) 10/19/2014    Depression 10/19/2014    Hypothyroidism 10/19/2014    Diarrhea 10/19/2014    Leukopenia 10/19/2014    Anemia 10/19/2014    Transaminitis 10/19/2014    Hyperbilirubinemia 10/19/2014    Weakness 10/18/2014         Plan:   Pt. Stable Orthopedically  Age indeterminate mild L3 endplate compression; stable on physical examination  Discussed with Dr. Vila Rastafarian; Will plan for conservative treatment with TLSO, PT/ OT, pain control. Can follow up as outpatient with Dr. Samanta Jackson. Will continue to follow.      Vinh Wall NP  Orthopaedic Nurse Practitioner    [address] (P:  [phone])

## 2017-07-03 NOTE — PROGRESS NOTES
Problem: Falls - Risk of  Goal: *Absence of falls  Outcome: Progressing Towards Goal  Pt free from injury. Pt had an severe confusion this morning. Got out of her room, pulled out her Iv and was yelling saying\" I want all of you out of my house, I don't know why my granddaughter have your guys here, all my furniture has been move around and I want it back\". Pt was re-oriented as often as needed. Pt was finally brought back to her room after a few trials. Nurse-incharge later went in to see if a new IV can be inserted and pt refused with her 5 o'clock heparin. Will continue to monitor and keep pt safe; bed alarm set on.

## 2017-07-03 NOTE — PROGRESS NOTES
[address] [place] [address]  YOB: 1932          Assessment & Plan:   1. NIMCO  - UA: Mild protein,   - US: no hydro  - Cr better with Volume  - No RRT needed  - Etiology remains elusive  - I rev home meds: not on Nephrotoxins  - ? Was on NSAIDs  - ck ok  - cont volume  2. Leukocytosis  - on CTX  3. HTN  - Not on meds       Subjective:   CC:arf  HPI: Patient seen   NIMCO is better, no nvd. No cp/sob    ROS: neg for 12 syst  Current Facility-Administered Medications   Medication Dose Route Frequency    sodium chloride (NS) flush 5-10 mL  5-10 mL IntraVENous Q8H    sodium chloride (NS) flush 5-10 mL  5-10 mL IntraVENous PRN    naloxone (NARCAN) injection 0.4 mg  0.4 mg IntraVENous PRN    levothyroxine (SYNTHROID) tablet 50 mcg  50 mcg Oral ACB    venlafaxine-SR (EFFEXOR-XR) capsule 225 mg  225 mg Oral DAILY    buPROPion XL (WELLBUTRIN XL) tablet 300 mg  300 mg Oral DAILY    cefTRIAXone (ROCEPHIN) 1 g in 0.9% sodium chloride (MBP/ADV) 50 mL  1 g IntraVENous Q24H    traMADol (ULTRAM) tablet 50 mg  50 mg Oral Q6H PRN    0.9% sodium chloride infusion  75 mL/hr IntraVENous CONTINUOUS    heparin (porcine) injection 5,000 Units  5,000 Units SubCUTAneous Q8H    ondansetron (ZOFRAN) injection 4 mg  4 mg IntraVENous Q4H PRN          Objective:     Vitals:  Blood pressure 168/76, pulse 86, temperature 98.3 °F (36.8 °C), resp. rate 18, height 5' 3\" (1.6 m), weight 70.3 kg (155 lb), SpO2 98 %, not currently breastfeeding. Temp (24hrs), Av.5 °F (36.9 °C), Min:98.3 °F (36.8 °C), Max:98.6 °F (37 °C)      Intake and Output:      1901 -  0700  In: 010 [P.O.:120;  I.V.:825]  Out: -     Physical Exam:                Patient is intubated:  no    Physical Examination:   GENERAL ASSESSMENT: NAD  HEENT:Nontraumatic   CHEST: CTA  HEART: S1S2  ABDOMEN: Soft,NT,  :Rivera: n  EXTREMITY: EDEMA  NEURO:Grossly non focal          ECG/rhythm:    Data Review      No results for input(s): TNIPOC in the last 72 hours. No lab exists for component: ITNL   Recent Labs      07/02/17   0058   CPK  50   CKMB  <1.0   TROIQ  <0.04     Recent Labs      07/03/17   1035  07/02/17   0058   NA  139  137   K  4.3  4.3   CL  106  102   CO2  23  20*   BUN  39*  44*   CREA  3.78*  3.98*   GLU  86  124*   MG  1.5*   --    CA  8.3*  9.3   ALB  3.2*  3.6   WBC  9.0  15.2*   HGB  12.1  13.9   HCT  36.7  40.8   PLT  216  252      No results for input(s): INR, PTP, APTT in the last 72 hours. No lab exists for component: INREXT  Needs: urine analysis, urine sodium, protein and creatinine  Lab Results   Component Value Date/Time    Sodium urine, random 55 07/02/2017 02:41 AM    Creatinine, urine 187.78 12/21/2014 03:50 PM         Discussed with:  Family, Colleague    : Mercedez Nelson MD  7/3/2017        [facility]:  [email or web address]  [place] office:  [address] [place] [address] [employer] [facility], [address] [facility] [address]  [place], [id number] [place] [address]  Phone: [phone]  Fax :     [phone]    [place] office:  [address] [facility], [address] [facility]  Phone - [phone]  Fax - [phone]

## 2017-07-03 NOTE — CONSULTS
Charanjit Li [address], [address] [place] [address] Road       Name:  Viji Kraft   MR#:  [id number]   :  1932   Account #:  [de-identified]    Date of Consultation:  2017   Date of Adm:  2017       REASON FOR CONSULTATION: Evaluation of acute kidney failure. HISTORY OF PRESENT ILLNESS: This is an 51-year-old female with   history of hypertension, hypothyroidism, depression and prior PE who   presented with complaints of low back pain. She was noted to have   severe renal dysfunction on admission. We are asked to see her for   evaluation therein. In the ED she was noted to have severe acute   kidney injury with an elevation of the serum creatinine at 3.98   mg/percent. She was also specifically not noted to be anemic at that   time. Because of the renal dysfunction and the fact that this was new in   compared to older labs which on review the most recent labs available   were from 2016 with a creatinine of 1.37. Regardless,   because of these findings she was admitted to the hospital and we are   asked to see her for further help with management of these issues. I would not that urinalysis on admission showed 1+ protein and small   amounts of blood. Otherwise, no major findings were obtained on the   ER evaluation. The patient was seen by Dr. Ugo Wakefield who admitted her to   the hospitalist's service. PAST MEDICAL HISTORY: Her past medical problems include:   1. Hypertension. 2.  Murmur. 3.  Depression. 4.  Hypothyroidism. 5.  Pulmonary thromboembolic disease. 6.  Vertigo. 7.  Autoimmune hemolytic anemia. 8.  Asthma. 9.  Arthritis. PAST SURGICAL HISTORY: As noted in the chart. SOCIAL HISTORY: She does not smoke or drink alcohol to excess. No recreational drug use. FAMILY HISTORY: Positive for hypertension and diabetes. ALLERGIES:   1. PERCOCET. 2.  PENICILLIN. 3.  SULFA. MEDICATIONS: On admission include:   1.  Gabapentin. 2.  Wellbutrin. 3.  Effexor. 4.  L-thyroxine. 5.  HFA. REVIEW OF SYSTEMS: On questioning she does complain of fatigue,   nausea, emesis, arthritis and weakness. A 12-point review of systems   otherwise done in its entirety and was negative. PHYSICAL EXAMINATION:   VITAL SIGNS:  Blood pressure was 140/72. Pulse 88 and regular. Respirations 16 to 18. Temperature is afebrile. SKIN: Negative. HEENT: Normocephalic, atraumatic cranium. Pupils are reactive to   light. Extraocular movements were intact. Sclerae are anicteric. ENT   was clear with somewhat dry mucosa. NECK: Supple without jugular venous distension. Trachea is midline. Thyroid is not enlarged. LUNGS: Clear to auscultation and percussion. HEART: Regular rate and rhythm with clicks, murmurs or rubs noted. PMI was nondisplaced. Peripheral pulses were intact. ABDOMEN: Normoactive bowel sounds. Soft, nondistended,   nontender. No liver or spleen enlargement was noted. EXTREMITIES: No clubbing or cyanosis. There was no edema. NEUROLOGICAL: Examination showed her to be oriented to person,   pace and situation. Cranial nerves II through XII, motor and sensory   examination intact. Cerebellar screening, deep tendon reflexes not   done. LABORATORY DATA: White count of 15.2, hemoglobin 13.9, sodium   137, potassium 4.3, chloride 102, CO2 20, BUN 44, creatinine 3.98. IMPRESSION:   1. Severe acute kidney failure versus chronic kidney disease versus   acute on chronic. 2.  Suspect volume depletion with poor p.o. intake lately, but agree that   this is dramatic increase of serum creatinine for that. RECOMMENDATIONS:   1. Will do screening serologies to screen for diseases such as   myeloma, vasculitis, chronic hepatitis C.   2.  Agree with ultrasound. 3.  IV volume expansion. 4.  Monitor closely. 5.  May need biopsy.    6.  Quantitate urine albumin to creatinine ratio.           MD LAYLA Phillips / MONCHO   D:  07/02/2017   21:00   T:  07/02/2017   23:46   Job #:  [id number]

## 2017-07-03 NOTE — PROGRESS NOTES
Pt is confused in bed, bed alarm placed on. Pt got out of bed, combative, yelling and screaming, confused. Four RN's with pt. Called Dr. Tayler Mcpherson. Informed of the above. One time stat order for Haldol 1mg iv or im. Then MD waiting for Psych evaluation results.

## 2017-07-03 NOTE — PROGRESS NOTES
Nutrition Assessment:    RECOMMENDATIONS/INTERVENTION(S):   Ensure Enlive BID  Monitor PO intakes, wt.     ASSESSMENT:   7/3: Admitted after fall, back pain. PMHx: HTN, hypothyroid, NIMCO, UTI, PE. Pt on regular diet, pt eating < 25% of meals. Breakfast tray observed, lunch tray hadn't been touched yet at 2:30pm. Pt agreeable to Ensure BID until appetite improves. Pt in pain and slightly confused during visit. Pt says she's able to call and order meals but hadn't done so yet. Instructed pt how to order meals to encourage PO intakes. Documented as 50% on 7/2. Pt states she's lost around 10 lbs over the last 2 months 2/2 poor PO intakes at home. Pt in visible pain after mild cough. Pt states she was nauseous earlier but not currently. Labs/meds reviewed. Diet Order: Regular  % Eaten:  Patient Vitals for the past 72 hrs:   % Diet Eaten   07/02/17 1300 50 %     Pertinent Medications: [x] Reviewed    Chemistries:  Lab Results   Component Value Date/Time    Sodium 139 07/03/2017 10:35 AM    Potassium 4.3 07/03/2017 10:35 AM    Chloride 106 07/03/2017 10:35 AM    CO2 23 07/03/2017 10:35 AM    Anion gap 10 07/03/2017 10:35 AM    Glucose 86 07/03/2017 10:35 AM    BUN 39 07/03/2017 10:35 AM    Creatinine 3.78 07/03/2017 10:35 AM    BUN/Creatinine ratio 10 07/03/2017 10:35 AM    GFR est AA 14 07/03/2017 10:35 AM    GFR est non-AA 11 07/03/2017 10:35 AM    Calcium 8.3 07/03/2017 10:35 AM    AST (SGOT) 20 07/03/2017 10:35 AM    Alk. phosphatase 110 07/03/2017 10:35 AM    Protein, total 6.6 07/03/2017 10:35 AM    Albumin 3.2 07/03/2017 10:35 AM    Globulin 3.4 07/03/2017 10:35 AM    A-G Ratio 0.9 07/03/2017 10:35 AM    ALT (SGPT) 18 07/03/2017 10:35 AM      Anthropometrics: Height: 5' 3\" (160 cm) Weight: 70.3 kg (155 lb)    IBW (%IBW):   ( ) UBW (%UBW):   (  %)    BMI: Body mass index is 27.46 kg/(m^2).     This BMI is indicative of:   [] Underweight    [x] Normal    [] Overweight    []  Obesity    []  Extreme Obesity (BMI>40)  Estimated Nutrition Needs (Based on): 1451 Kcals/day (MSJ (1122x1.3)) , 56 g (-70g/day (0.8-1.0g/kg)) Protein  Carbohydrate: At Least 130 g/day  Fluids: 1450 mL/day    Last BM: 7/3   [x]Active     []Hyperactive  []Hypoactive       [] Absent   BS  Skin:    [x] Intact   [] Incision  [] Breakdown   [] DTI   [] Tears/Excoriation/Abrasion  []Edema [] Other:    Wt Readings from Last 30 Encounters:   07/02/17 70.3 kg (155 lb)   09/16/16 72.6 kg (160 lb)   04/04/16 72.6 kg (160 lb)   04/08/15 77.2 kg (170 lb 3.2 oz)   02/09/15 75.3 kg (166 lb)   12/24/14 91.4 kg (201 lb 8 oz)   12/11/14 77.1 kg (170 lb)   12/03/14 76.9 kg (169 lb 9.6 oz)   11/24/14 77.1 kg (170 lb)   11/05/14 74 kg (163 lb 3.2 oz)   10/18/14 76.2 kg (168 lb)      NUTRITION DIAGNOSES:   Problem:  Inadequate energy intake     Etiology: related to decreased ability to consume sufficient energy     Signs/Symptoms: as evidenced by poor PO intakes, observed intakes <25%      NUTRITION INTERVENTIONS:  Meals/Snacks: General/healthful diet   Supplements: Commercial supplement              GOAL:   consume > 50% of meals and ONS    Cultural, Islam, or Ethnic Dietary Needs: None     LEARNING NEEDS (Diet, Food/Nutrient-Drug Interaction):    [x] None Identified   [] Identified and Education Provided/Documented   [] Identified and Pt declined/was not appropriate      [x] Interdisciplinary Care Plan Reviewed/Documented    [x] Discharge Needs:    TBD   [] No Nutrition Related Discharge Needs    NUTRITION RISK:   Pt Is At Nutrition Risk  [x]     No Nutrition Risk Identified  []       PT SEEN FOR:    []  MD Consult: []Calorie Count      []Diabetic Diet Education        []Diet Education     []Electrolyte Management     []General Nutrition Management and Supplements     []Management of Tube Feeding     []TPN Recommendations    []  RN Referral:  [x]MST score >=2     []Enteral/Parenteral Nutrition PTA     []Pregnant: Gestational DM or Multigestation [] Pressure Ulcer    []  Low BMI      []  Length of Stay       [] Dysphagia Diet         [] Ventilator            Joseph Adrian, [address] [place] [address]  Pager [phone]

## 2017-07-03 NOTE — PROGRESS NOTES
7/3/2017  10:55 AM  CM met w/ Pt's Daughter in [facility] who is POA  [phone], [phone] for needs assessment and D/C planning. Charted demographics verified, lives in [place] [facility] [address] [facility] [phone], in in-law-suite. PTA family assists w/ all ADL's, DIL states \"Pt will not do anything for herself, but will do whatever the therapists working with her ask\"  DIL stated \"we need help\". I ecplained the role of CM. PCP is Mata Lloyd MD; Rx fills at [address] [facility]. Pt has had  Previous rehab at [facility], no recent [facility]. Clearence Shows transports to all appts. Care Management Interventions  PCP Verified by CM: Yes Charlie Leon MD Last visit unknown)  Palliative Care Consult (Criteria: CHF and RRAT>21): No  Reason for No Palliative Care Consult: Other (see comment) (Pt RRAT 17)  Mode of Transport at Discharge: Self  Discharge Durable Medical Equipment: No  Physical Therapy Consult: Yes  Occupational Therapy Consult: Yes  Current Support Network: Relative's Home (Lives w/ Granddaughter [address] [facility] [phone])  Confirm Follow Up Transport: Family  Plan discussed with Pt/Family/Caregiver: Yes  Discharge Location  Discharge Placement:  (TBD)   I gave family resources for private duty aids. CM will follow for D/C plan.   Isma Valladares  Case Management

## 2017-07-04 LAB
ALBUMIN SERPL BCP-MCNC: 2.8 G/DL (ref 3.5–5)
ALBUMIN/GLOB SERPL: 0.9 {RATIO} (ref 1.1–2.2)
ALP SERPL-CCNC: 96 U/L (ref 45–117)
ALT SERPL-CCNC: 14 U/L (ref 12–78)
ANION GAP BLD CALC-SCNC: 15 MMOL/L (ref 5–15)
AST SERPL W P-5'-P-CCNC: 12 U/L (ref 15–37)
BACTERIA SPEC CULT: ABNORMAL
BASOPHILS # BLD AUTO: 0 K/UL (ref 0–0.1)
BASOPHILS # BLD: 0 % (ref 0–1)
BILIRUB SERPL-MCNC: 0.2 MG/DL (ref 0.2–1)
BUN SERPL-MCNC: 34 MG/DL (ref 6–20)
BUN/CREAT SERPL: 10 (ref 12–20)
C3 SERPL-MCNC: 122 MG/DL (ref 82–167)
C4 SERPL-MCNC: 34 MG/DL (ref 14–44)
CALCIUM SERPL-MCNC: 8.5 MG/DL (ref 8.5–10.1)
CC UR VC: ABNORMAL
CHLORIDE SERPL-SCNC: 107 MMOL/L (ref 97–108)
CO2 SERPL-SCNC: 19 MMOL/L (ref 21–32)
CREAT SERPL-MCNC: 3.44 MG/DL (ref 0.55–1.02)
EOSINOPHIL # BLD: 0.2 K/UL (ref 0–0.4)
EOSINOPHIL NFR BLD: 3 % (ref 0–7)
ERYTHROCYTE [DISTWIDTH] IN BLOOD BY AUTOMATED COUNT: 15.6 % (ref 11.5–14.5)
FOLATE SERPL-MCNC: 9 NG/ML (ref 5–21)
GLOBULIN SER CALC-MCNC: 3.2 G/DL (ref 2–4)
GLUCOSE SERPL-MCNC: 89 MG/DL (ref 65–100)
HCT VFR BLD AUTO: 33.8 % (ref 35–47)
HGB BLD-MCNC: 11.2 G/DL (ref 11.5–16)
LYMPHOCYTES # BLD AUTO: 14 % (ref 12–49)
LYMPHOCYTES # BLD: 1 K/UL (ref 0.8–3.5)
MAGNESIUM SERPL-MCNC: 1.7 MG/DL (ref 1.6–2.4)
MCH RBC QN AUTO: 28.3 PG (ref 26–34)
MCHC RBC AUTO-ENTMCNC: 33.1 G/DL (ref 30–36.5)
MCV RBC AUTO: 85.4 FL (ref 80–99)
MONOCYTES # BLD: 0.9 K/UL (ref 0–1)
MONOCYTES NFR BLD AUTO: 12 % (ref 5–13)
NEUTS SEG # BLD: 5.2 K/UL (ref 1.8–8)
NEUTS SEG NFR BLD AUTO: 71 % (ref 32–75)
PLATELET # BLD AUTO: 220 K/UL (ref 150–400)
POTASSIUM SERPL-SCNC: 4.3 MMOL/L (ref 3.5–5.1)
PROT SERPL-MCNC: 6 G/DL (ref 6.4–8.2)
RBC # BLD AUTO: 3.96 M/UL (ref 3.8–5.2)
SERVICE CMNT-IMP: ABNORMAL
SODIUM SERPL-SCNC: 141 MMOL/L (ref 136–145)
VIT B12 SERPL-MCNC: 514 PG/ML (ref 211–911)
WBC # BLD AUTO: 7.4 K/UL (ref 3.6–11)

## 2017-07-04 PROCEDURE — 74011250637 HC RX REV CODE- 250/637: Performed by: PSYCHIATRY & NEUROLOGY

## 2017-07-04 PROCEDURE — 97530 THERAPEUTIC ACTIVITIES: CPT

## 2017-07-04 PROCEDURE — 74011250636 HC RX REV CODE- 250/636: Performed by: PSYCHIATRY & NEUROLOGY

## 2017-07-04 PROCEDURE — 74011250636 HC RX REV CODE- 250/636: Performed by: INTERNAL MEDICINE

## 2017-07-04 PROCEDURE — 65660000000 HC RM CCU STEPDOWN

## 2017-07-04 PROCEDURE — 97116 GAIT TRAINING THERAPY: CPT

## 2017-07-04 PROCEDURE — 83735 ASSAY OF MAGNESIUM: CPT | Performed by: FAMILY MEDICINE

## 2017-07-04 PROCEDURE — 86592 SYPHILIS TEST NON-TREP QUAL: CPT | Performed by: PSYCHIATRY & NEUROLOGY

## 2017-07-04 PROCEDURE — 36415 COLL VENOUS BLD VENIPUNCTURE: CPT | Performed by: FAMILY MEDICINE

## 2017-07-04 PROCEDURE — 82746 ASSAY OF FOLIC ACID SERUM: CPT | Performed by: PSYCHIATRY & NEUROLOGY

## 2017-07-04 PROCEDURE — 82607 VITAMIN B-12: CPT | Performed by: PSYCHIATRY & NEUROLOGY

## 2017-07-04 PROCEDURE — 74011250637 HC RX REV CODE- 250/637: Performed by: INTERNAL MEDICINE

## 2017-07-04 PROCEDURE — 85025 COMPLETE CBC W/AUTO DIFF WBC: CPT | Performed by: FAMILY MEDICINE

## 2017-07-04 PROCEDURE — 80053 COMPREHEN METABOLIC PANEL: CPT | Performed by: FAMILY MEDICINE

## 2017-07-04 PROCEDURE — 74011000258 HC RX REV CODE- 258: Performed by: INTERNAL MEDICINE

## 2017-07-04 RX ADMIN — HEPARIN SODIUM 5000 UNITS: 5000 INJECTION, SOLUTION INTRAVENOUS; SUBCUTANEOUS at 14:07

## 2017-07-04 RX ADMIN — CEFTRIAXONE SODIUM 1 G: 1 INJECTION, POWDER, FOR SOLUTION INTRAMUSCULAR; INTRAVENOUS at 09:19

## 2017-07-04 RX ADMIN — HEPARIN SODIUM 5000 UNITS: 5000 INJECTION, SOLUTION INTRAVENOUS; SUBCUTANEOUS at 21:03

## 2017-07-04 RX ADMIN — BUPROPION HYDROCHLORIDE 150 MG: 150 TABLET, FILM COATED, EXTENDED RELEASE ORAL at 09:18

## 2017-07-04 RX ADMIN — TRAZODONE HYDROCHLORIDE 25 MG: 50 TABLET ORAL at 21:03

## 2017-07-04 RX ADMIN — VENLAFAXINE HYDROCHLORIDE 225 MG: 150 CAPSULE, EXTENDED RELEASE ORAL at 09:18

## 2017-07-04 RX ADMIN — Medication 10 ML: at 21:03

## 2017-07-04 RX ADMIN — Medication 10 ML: at 14:08

## 2017-07-04 RX ADMIN — LEVOTHYROXINE SODIUM 50 MCG: 50 TABLET ORAL at 09:18

## 2017-07-04 RX ADMIN — HALOPERIDOL LACTATE 1 MG: 5 INJECTION, SOLUTION INTRAMUSCULAR at 09:18

## 2017-07-04 NOTE — PROGRESS NOTES
Bedside shift change report given to Emerald Huntley RN (oncoming nurse) by Nell Eagle RN (offgoing nurse). Report included the following information SBAR, Kardex and MAR.

## 2017-07-04 NOTE — PROGRESS NOTES
Bedside and Verbal shift change report given to Serenity Tony (oncoming nurse) by Sasha Marcos (offgoing nurse). Report included the following information SBAR, Kardex, Intake/Output, MAR, Recent Results and Cardiac Rhythm NSR.

## 2017-07-04 NOTE — PROGRESS NOTES
Bedside shift change report given to Danette Lopez RN (oncoming nurse) by Simona Young RN (offgoing nurse). Report included the following information SBAR, Kardex and MAR.

## 2017-07-04 NOTE — PROGRESS NOTES
Daily Progress Note: 7/4/2017  Maria Fernanda Piper-Kaylan Espinosa MD    Assessment/Plan:   NIMCO: unclear etiology.  Has mild IVVD  --Cr improving slowly  --normal renal US  --nephrology followed     UTI -- culture shows gram neg rods  --continue rocephin, follow culture    Back pain - superior endplate depression, mild L3  --pain control - consider ortho consult       HTN: watch  -- not on meds       Depression/Paranoia/agitation -- hx of dementia  --Psych has seen, recommend neuro consult and meds have been adjusted  --haldol prn agitation       Hypothyroidism  -- cont supplement       History of PE: not on anticoagulation  -- DVT ppx on heparin SQ        Weakness / Fall: lives with grand-daughter   -- PT / Kathlyne Check, fall precautions         Problem List:  Problem List as of 7/4/2017  Date Reviewed: 7/2/2017          Codes Class Noted - Resolved    UTI (urinary tract infection) ICD-10-CM: N39.0  ICD-9-CM: 599.0  7/2/2017 - Present        Acute respiratory failure ([facility] ICD-10-CM: J96.00  ICD-9-CM: 518.81  12/21/2014 - Present        PE (pulmonary embolism) ICD-10-CM: I26.99  ICD-9-CM: 415.19  12/11/2014 - Present        NMICO (acute kidney injury) ([facility] ICD-10-CM: N17.9  ICD-9-CM: 584.9  12/11/2014 - Present        Renal lesion ICD-10-CM: N28.9  ICD-9-CM: 593.9  11/5/2014 - Present        Autoimmune hemolytic anemia ([facility] ICD-10-CM: D59.1  ICD-9-CM: 283.0  11/5/2014 - Present        Leukocytosis ICD-10-CM: T30.414  ICD-9-CM: 288.60  10/25/2014 - Present        HTN (hypertension) ICD-10-CM: I10  ICD-9-CM: 401.9  10/19/2014 - Present        Depression ICD-10-CM: F32.9  ICD-9-CM: 080  10/19/2014 - Present        Hypothyroidism ICD-10-CM: E03.9  ICD-9-CM: 244.9  10/19/2014 - Present        Diarrhea ICD-10-CM: R19.7  ICD-9-CM: 787.91  10/19/2014 - Present        Leukopenia ICD-10-CM: D72.819  ICD-9-CM: 288.50  10/19/2014 - Present        Anemia ICD-10-CM: D64.9  ICD-9-CM: 285.9  10/19/2014 - Present        Transaminitis ICD-10-CM: R74.0  ICD-9-CM: 790.4  10/19/2014 - Present        Hyperbilirubinemia ICD-10-CM: E80.6  ICD-9-CM: 782.4  10/19/2014 - Present        Weakness ICD-10-CM: R53.1  ICD-9-CM: 780.79  10/18/2014 - Present              Subjective:   H&P: Ms. Charles Pena is a 80 y.o. female w/ hx of HTN, hypothyroidism, depression, prior PE who presents with low back pain after fall. Per family at bedside, pt has been very weak in recent weeks, intermittent, moderate, worsening, associated with falling at home. Yesterday, pt was walking to bathroom and fell to the floor on her back. Now with severe back pain. No weakness or numbness. No fevers/chills, cough, CP, SOB. Reports nausea. ED workup showed severe NIMCO, leukocytosis. 7/2:  Patient c/o at least a week of nausea, not eating much. No abd. Pain, bowels have been OK. She denies dysuria. She has had chronic back pain, and now worse after her fall. This AM she still is with back pain but improved, she just ate some breakfast and feels like it will probably stay down. 7/3:  Nausea is some better. She wants to see ortho for her back pain. Creat at 3 yesterday. AM labs pending. Nurses report she is not letting them draw labs or start IV. Sees Neuro outpt for dementia. 7/4: Haldol last night around 10pm for agitation/paranoia -- she has been sleeping since. Didn't have her dinner last night. When I wake her up and introduce myself this morning she seems to recognize me, she does not remember her paranoid activities. She know she is in the hospital and admits to back pain. Review of Systems:   Review of systems not obtained due to patient factors.     Objective:   Physical Exam:     Visit Vitals    /78 (BP 1 Location: Right arm, BP Patient Position: At rest)    Pulse 89    Temp 98.7 °F (37.1 °C)    Resp 16    Ht 5' 3\" (1.6 m)    Wt 70.3 kg (155 lb)    SpO2 93%    Breastfeeding No    BMI 27.46 kg/m2      O2 Device: Room air    Temp (24hrs), Av.4 °F (36.9 °C), Min:98 °F (36.7 °C), Max:98.7 °F (37.1 °C)    1901 - 700  In: -   Out: 400 [Urine:400]   701 - 1900  In: 037 [P.O.:120; I.V.:825]  Out: -     General:  Alert, cooperative, no distress, appears stated age. Head:  Normocephalic, without obvious abnormality, atraumatic. Eyes:  Conjunctivae/corneas clear. PERRL, EOMs intact. Neck: Supple, symmetrical, trachea midline   Lungs:   Clear to auscultation bilaterally. Heart:  Regular rate and rhythm, S1, S2 normal, no murmur, click, rub or gallop. Abdomen:   Soft, non-tender. Bowel sounds normal. No masses,  No organomegaly. Extremities: Extremities normal, atraumatic, no cyanosis or edema. No calf tenderness or cords. Pulses: 2+ and symmetric all extremities. Skin: Skin color, texture, turgor normal. No rashes or lesions   Neurologic: CNII-XII intact. Alert and oriented X 2 today [person,palce] - waxes and wanes. Data Review:       Recent Days:  Recent Labs      17   1035  17   0058   WBC  7.4  9.0  15.2*   HGB  11.2*  12.1  13.9   HCT  33.8*  36.7  40.8   PLT  220  216  252     Recent Labs      17   1035  17   0058   NA  141  139  137   K  4.3  4.3  4.3   CL  107  106  102   CO2  19*  23  20*   GLU  89  86  124*   BUN  34*  39*  44*   CREA  3.44*  3.78*  3.98*   CA  8.5  8.3*  9.3   MG  1.7  1.5*   --    ALB  2.8*  3.2*  3.6   SGOT  12*  20  21   ALT  14  18  22     No results for input(s): PH, PCO2, PO2, HCO3, FIO2 in the last 72 hours.     24 Hour Results:  Recent Results (from the past 24 hour(s))   METABOLIC PANEL, COMPREHENSIVE    Collection Time: 17 10:35 AM   Result Value Ref Range    Sodium 139 136 - 145 mmol/L    Potassium 4.3 3.5 - 5.1 mmol/L    Chloride 106 97 - 108 mmol/L    CO2 23 21 - 32 mmol/L    Anion gap 10 5 - 15 mmol/L    Glucose 86 65 - 100 mg/dL    BUN 39 (H) 6 - 20 MG/DL    Creatinine 3.78 (H) 0.55 - 1.02 MG/DL BUN/Creatinine ratio 10 (L) 12 - 20      GFR est AA 14 (L) >60 ml/min/1.73m2    GFR est non-AA 11 (L) >60 ml/min/1.73m2    Calcium 8.3 (L) 8.5 - 10.1 MG/DL    Bilirubin, total 0.3 0.2 - 1.0 MG/DL    ALT (SGPT) 18 12 - 78 U/L    AST (SGOT) 20 15 - 37 U/L    Alk. phosphatase 110 45 - 117 U/L    Protein, total 6.6 6.4 - 8.2 g/dL    Albumin 3.2 (L) 3.5 - 5.0 g/dL    Globulin 3.4 2.0 - 4.0 g/dL    A-G Ratio 0.9 (L) 1.1 - 2.2     CBC WITH AUTOMATED DIFF    Collection Time: 07/03/17 10:35 AM   Result Value Ref Range    WBC 9.0 3.6 - 11.0 K/uL    RBC 4.22 3.80 - 5.20 M/uL    HGB 12.1 11.5 - 16.0 g/dL    HCT 36.7 35.0 - 47.0 %    MCV 87.0 80.0 - 99.0 FL    MCH 28.7 26.0 - 34.0 PG    MCHC 33.0 30.0 - 36.5 g/dL    RDW 15.8 (H) 11.5 - 14.5 %    PLATELET 416 054 - 411 K/uL    NEUTROPHILS 82 (H) 32 - 75 %    LYMPHOCYTES 9 (L) 12 - 49 %    MONOCYTES 8 5 - 13 %    EOSINOPHILS 1 0 - 7 %    BASOPHILS 0 0 - 1 %    ABS. NEUTROPHILS 7.3 1.8 - 8.0 K/UL    ABS. LYMPHOCYTES 0.8 0.8 - 3.5 K/UL    ABS. MONOCYTES 0.7 0.0 - 1.0 K/UL    ABS. EOSINOPHILS 0.1 0.0 - 0.4 K/UL    ABS. BASOPHILS 0.0 0.0 - 0.1 K/UL   MAGNESIUM    Collection Time: 07/03/17 10:35 AM   Result Value Ref Range    Magnesium 1.5 (L) 1.6 - 2.4 mg/dL   TSH 3RD GENERATION    Collection Time: 07/03/17 10:35 AM   Result Value Ref Range    TSH 1.62 0.36 - 8.13 uIU/mL   METABOLIC PANEL, COMPREHENSIVE    Collection Time: 07/04/17  3:35 AM   Result Value Ref Range    Sodium 141 136 - 145 mmol/L    Potassium 4.3 3.5 - 5.1 mmol/L    Chloride 107 97 - 108 mmol/L    CO2 19 (L) 21 - 32 mmol/L    Anion gap 15 5 - 15 mmol/L    Glucose 89 65 - 100 mg/dL    BUN 34 (H) 6 - 20 MG/DL    Creatinine 3.44 (H) 0.55 - 1.02 MG/DL    BUN/Creatinine ratio 10 (L) 12 - 20      GFR est AA 15 (L) >60 ml/min/1.73m2    GFR est non-AA 13 (L) >60 ml/min/1.73m2    Calcium 8.5 8.5 - 10.1 MG/DL    Bilirubin, total 0.2 0.2 - 1.0 MG/DL    ALT (SGPT) 14 12 - 78 U/L    AST (SGOT) 12 (L) 15 - 37 U/L    Alk.  phosphatase 96 45 - 117 U/L    Protein, total 6.0 (L) 6.4 - 8.2 g/dL    Albumin 2.8 (L) 3.5 - 5.0 g/dL    Globulin 3.2 2.0 - 4.0 g/dL    A-G Ratio 0.9 (L) 1.1 - 2.2     MAGNESIUM    Collection Time: 07/04/17  3:35 AM   Result Value Ref Range    Magnesium 1.7 1.6 - 2.4 mg/dL   CBC WITH AUTOMATED DIFF    Collection Time: 07/04/17  3:35 AM   Result Value Ref Range    WBC 7.4 3.6 - 11.0 K/uL    RBC 3.96 3.80 - 5.20 M/uL    HGB 11.2 (L) 11.5 - 16.0 g/dL    HCT 33.8 (L) 35.0 - 47.0 %    MCV 85.4 80.0 - 99.0 FL    MCH 28.3 26.0 - 34.0 PG    MCHC 33.1 30.0 - 36.5 g/dL    RDW 15.6 (H) 11.5 - 14.5 %    PLATELET 653 915 - 387 K/uL    NEUTROPHILS 71 32 - 75 %    LYMPHOCYTES 14 12 - 49 %    MONOCYTES 12 5 - 13 %    EOSINOPHILS 3 0 - 7 %    BASOPHILS 0 0 - 1 %    ABS. NEUTROPHILS 5.2 1.8 - 8.0 K/UL    ABS. LYMPHOCYTES 1.0 0.8 - 3.5 K/UL    ABS. MONOCYTES 0.9 0.0 - 1.0 K/UL    ABS. EOSINOPHILS 0.2 0.0 - 0.4 K/UL    ABS.  BASOPHILS 0.0 0.0 - 0.1 K/UL       Medications reviewed  Current Facility-Administered Medications   Medication Dose Route Frequency    buPROPion XL (WELLBUTRIN XL) tablet 150 mg  150 mg Oral DAILY    traZODone (DESYREL) tablet 25 mg  25 mg Oral QHS    haloperidol lactate (HALDOL) injection 1 mg  1 mg IntraVENous Q6H PRN    haloperidol lactate (HALDOL) injection 1 mg  1 mg IntraMUSCular Q6H PRN    sodium chloride (NS) flush 5-10 mL  5-10 mL IntraVENous Q8H    sodium chloride (NS) flush 5-10 mL  5-10 mL IntraVENous PRN    naloxone (NARCAN) injection 0.4 mg  0.4 mg IntraVENous PRN    levothyroxine (SYNTHROID) tablet 50 mcg  50 mcg Oral ACB    venlafaxine-SR (EFFEXOR-XR) capsule 225 mg  225 mg Oral DAILY    cefTRIAXone (ROCEPHIN) 1 g in 0.9% sodium chloride (MBP/ADV) 50 mL  1 g IntraVENous Q24H    traMADol (ULTRAM) tablet 50 mg  50 mg Oral Q6H PRN    0.9% sodium chloride infusion  75 mL/hr IntraVENous CONTINUOUS    heparin (porcine) injection 5,000 Units  5,000 Units SubCUTAneous Q8H    ondansetron (ZOFRAN) injection 4 mg  4 mg IntraVENous Q4H PRN         Phyliss Po, MD

## 2017-07-04 NOTE — CONSULTS
Initial Psychiatric Consultation  Dr. Nury Muller, Psychiatrist, [facility]    Maria Fernanda Bradley female 80 y.o. Chief Complaint: confusion/agitation    History of Present Illness:  I was asked to see this patient by Dr. Favian Dutta for the above reason. I saw the patient and examined her, spoke with Dr. Favian Dutta, spoke with her granddaughter with whom she lives (as well as her other granddaughter and daughter in law). The patient is unable to give me her own history. She is obviously very suspicious of me and is also very paranoid about her granddaughter's presence. She was admitted on 7/2 with low back pain after a fall. The family gives her history. This is a patient with a memory decline that began in about 2009. She forgets names and relationships of family members. She forgets where she puts things and leave things in inappropriate places (like the microwave). She has become increasingly isolative. She does not handle her own bills or medications. She does enjoy her grandchildren/family. Also in 2009, the patient began have several days of agitation and extreme irritability about every 3 months; some symptoms of paranoia during these times. The patient had a history prior to 2009 of depression (not rishabh or psychosis or significant irritability). The family felt that the patient's irritability spells were related to depression, and the patient's PCP continued to make medication adjustments. She has been on Wellbutrin  mg and Effexor  mg for over a years, and on these medications the patient had not had one of these spells for over a year until today. Today she is agitated and paranoid. She was helped by 1 mg of Haldol. She does not follow with a psychiatrist as an outpatient.   The family is not aware of any work up of the patient's memory decline; the patient saw a neurologist several years ago and was put on Neurontin for migraines, but no follow up occurred. ROS:  Gen - WdWn patient. Psych - Paranoid, agitated on interview. Past Medical History:  Patient Active Problem List    Diagnosis Date Noted    UTI (urinary tract infection) 07/02/2017    Acute respiratory failure ([facility] [address] 12/21/2014    PE (pulmonary embolism) 12/11/2014    NIMCO (acute kidney injury) ([facility] [address] 12/11/2014    Renal lesion 11/05/2014    Autoimmune hemolytic anemia ([facility] [address] 11/05/2014    Leukocytosis 10/25/2014    HTN (hypertension) 10/19/2014    Depression 10/19/2014    Hypothyroidism 10/19/2014    Diarrhea 10/19/2014    Leukopenia 10/19/2014    Anemia 10/19/2014    Transaminitis 10/19/2014    Hyperbilirubinemia 10/19/2014    Weakness 10/18/2014     Past Medical History:   Diagnosis Date    Arthritis     Asthma     Autoimmune hemolytic anemia (HCC)     Depression     Heart murmur     Hypertension     Hypothyroidism     Pulmonary embolism (HCC)     Thromboembolus (HCC)     Vertigo        Past Psychiatric History:  No history of suicide attempts or psychiatric hospitalizations. See hpi. No history of dementia specific medications.     Current Inpatient Medications:  Current Facility-Administered Medications   Medication Dose Route Frequency    [START ON 7/4/2017] buPROPion XL (WELLBUTRIN XL) tablet 150 mg  150 mg Oral DAILY    traZODone (DESYREL) tablet 25 mg  25 mg Oral QHS    haloperidol lactate (HALDOL) injection 1 mg  1 mg IntraVENous Q6H PRN    haloperidol lactate (HALDOL) injection 1 mg  1 mg IntraMUSCular Q6H PRN    sodium chloride (NS) flush 5-10 mL  5-10 mL IntraVENous Q8H    sodium chloride (NS) flush 5-10 mL  5-10 mL IntraVENous PRN    naloxone (NARCAN) injection 0.4 mg  0.4 mg IntraVENous PRN    levothyroxine (SYNTHROID) tablet 50 mcg  50 mcg Oral ACB    venlafaxine-SR (EFFEXOR-XR) capsule 225 mg  225 mg Oral DAILY    cefTRIAXone (ROCEPHIN) 1 g in 0.9% sodium chloride (MBP/ADV) 50 mL  1 g IntraVENous Q24H    traMADol (ULTRAM) tablet 50 mg  50 mg Oral Q6H PRN    0.9% sodium chloride infusion  75 mL/hr IntraVENous CONTINUOUS    heparin (porcine) injection 5,000 Units  5,000 Units SubCUTAneous Q8H    ondansetron (ZOFRAN) injection 4 mg  4 mg IntraVENous Q4H PRN        Social History:  Lives with granddaughter but has become increasingly full-time care. The family is unsure of whether or not she can continue to live in her current situation. H/o remote substance abuse, family does not have a lot of details. Psychiatric Family History: Elderly sister with dementia, significant depression. Mental Status Examination:  Level of Consciousness:  Alert  Orientation:  Does know she is in a hospital, doesn't know the name; won't respond to any date/day/month questions.   Appearance:  Normal grooming for situation  Cooperation:  poor  Speech:  Normal rate/rhythm although doesn't answer many questions - difficult to fully assess  Mood:  mad  Affect:  agitated  Thought process:  Refuses most questions  Suicidal Ideation:  Refuses question  Homicidal Ideation:  Refuses question  Perceptual Disturbances:  paranoid  Memory:  Impaired by history  Judgement/Insight:  poor  Attention/Concentration:  poor  Psychomotor Agitation:  Mild currently    Vital Signs:    Visit Vitals    /73 (BP 1 Location: Right arm, BP Patient Position: At rest)    Pulse 86    Temp 98 °F (36.7 °C)    Resp 18    Ht 5' 3\" (1.6 m)    Wt 70.3 kg (155 lb)    SpO2 99%    Breastfeeding No    BMI 27.46 kg/m2       Pertinent Labs/Studies:    Recent Results (from the past 24 hour(s))   METABOLIC PANEL, COMPREHENSIVE    Collection Time: 07/03/17 10:35 AM   Result Value Ref Range    Sodium 139 136 - 145 mmol/L    Potassium 4.3 3.5 - 5.1 mmol/L    Chloride 106 97 - 108 mmol/L    CO2 23 21 - 32 mmol/L    Anion gap 10 5 - 15 mmol/L    Glucose 86 65 - 100 mg/dL    BUN 39 (H) 6 - 20 MG/DL    Creatinine 3.78 (H) 0.55 - 1.02 MG/DL    BUN/Creatinine ratio 10 (L) 12 - 20      GFR est AA 14 (L) >60 ml/min/1.73m2    GFR est non-AA 11 (L) >60 ml/min/1.73m2    Calcium 8.3 (L) 8.5 - 10.1 MG/DL    Bilirubin, total 0.3 0.2 - 1.0 MG/DL    ALT (SGPT) 18 12 - 78 U/L    AST (SGOT) 20 15 - 37 U/L    Alk. phosphatase 110 45 - 117 U/L    Protein, total 6.6 6.4 - 8.2 g/dL    Albumin 3.2 (L) 3.5 - 5.0 g/dL    Globulin 3.4 2.0 - 4.0 g/dL    A-G Ratio 0.9 (L) 1.1 - 2.2     CBC WITH AUTOMATED DIFF    Collection Time: 07/03/17 10:35 AM   Result Value Ref Range    WBC 9.0 3.6 - 11.0 K/uL    RBC 4.22 3.80 - 5.20 M/uL    HGB 12.1 11.5 - 16.0 g/dL    HCT 36.7 35.0 - 47.0 %    MCV 87.0 80.0 - 99.0 FL    MCH 28.7 26.0 - 34.0 PG    MCHC 33.0 30.0 - 36.5 g/dL    RDW 15.8 (H) 11.5 - 14.5 %    PLATELET 447 129 - 728 K/uL    NEUTROPHILS 82 (H) 32 - 75 %    LYMPHOCYTES 9 (L) 12 - 49 %    MONOCYTES 8 5 - 13 %    EOSINOPHILS 1 0 - 7 %    BASOPHILS 0 0 - 1 %    ABS. NEUTROPHILS 7.3 1.8 - 8.0 K/UL    ABS. LYMPHOCYTES 0.8 0.8 - 3.5 K/UL    ABS. MONOCYTES 0.7 0.0 - 1.0 K/UL    ABS. EOSINOPHILS 0.1 0.0 - 0.4 K/UL    ABS. BASOPHILS 0.0 0.0 - 0.1 K/UL   MAGNESIUM    Collection Time: 07/03/17 10:35 AM   Result Value Ref Range    Magnesium 1.5 (L) 1.6 - 2.4 mg/dL   TSH 3RD GENERATION    Collection Time: 07/03/17 10:35 AM   Result Value Ref Range    TSH 1.62 0.36 - 3.74 uIU/mL       Impression:  80year old woman with long-standing and very atypical cycling mood/depressive symptoms along with what appears to be a Major neurocognitive disorder (dementia). I am recommending neurology consultation to evaluated her symptoms of dementia and consideration of the work up needed (MRI, EEG, etc). I will order B12/Folate/RPR; TSH is wnl. I will order some Trazodone qhs for some of the agitation symptoms; in addition, this patient sleeps very poorly at night. I will also decrease Wellbutrin XL with the possible goal of eventual discharge; this may be increasing agitation and does not have evidence for help in treating patient's with dementia.     Psychiatric Diagnoses:  Unspecified depressive disorder, Unspecified major neurocognitive disorder R41.9    Plan:  Discussed plan with family and Dr. Claude Roldan. 1.  I recommend a neurology consultation for dementia work up. I ordered initial labs as above. 2.  For mood, irritability symptoms likely associated both with depressive and cognitive illnesses, I will decrease Wellbutrin XL as above and add Trazodone 25 mg po qhs.  R/b/se dw family. 60 minutes spent on floor. >50% in counseling/coordination of care activities as described above. Crystal Renteria M.D.

## 2017-07-04 NOTE — PROGRESS NOTES
PHYSICAL THERAPY NOTE:   New order acknowledged for TLSO. Treatment attempted with patient sleeping soundly and did not arouse after calling her name loudly several times. RN reports that patient had received medication to calm her. Will continue to follow.

## 2017-07-04 NOTE — PROGRESS NOTES
Problem: Mobility Impaired (Adult and Pediatric)  Goal: *Acute Goals and Plan of Care (Insert Text)  Physical Therapy Goals  Initiated 7/2/2017  1. Patient will move from supine to sit and sit to supine in bed with moderate assistance within 7 day(s). 2. Patient will transfer from bed to chair and chair to bed with moderate assistance using the least restrictive device within 7 day(s). 3. Patient will perform sit to stand with moderate assistance within 7 day(s). 4. Patient will ambulate with moderate assistance for 50 feet with the least restrictive device within 7 day(s). 5. Patient will ascend/descend 3 stairs with double handrail(s) with moderate assistance within 7 day(s). PHYSICAL THERAPY TREATMENT  Patient: Nelly Piper-Kaylan [de-identified]80 y.o. female)  Date: 7/4/2017  Diagnosis: NIMCO (acute kidney injury) ([facility] [address] <principal problem not specified>       Precautions: DNR, Fall  Chart, physical therapy assessment, plan of care and goals were reviewed. ASSESSMENT:  Patient presents in bed long sitting and agreeable to treatment. Lumbar brace was donned once EOB with good response from patient once in place. Patient with improvement during all functional mobility this date, transferring to EOB at SBA, sit to stand is Min A and ambulation of 50 feet with RW at minimal assist.   Pt demonstrating unsteady gait, path deviations requiring Min A for balance recovery. Patient agreeable to sit up in bedside chair, alarm activated. Patient is a fall risk. Progression toward goals:  [ ]      Improving appropriately and progressing toward goals  [X]      Improving slowly and progressing toward goals  [ ]      Not making progress toward goals and plan of care will be adjusted       PLAN:  Patient continues to benefit from skilled intervention to address the above impairments. Continue treatment per established plan of care.   Discharge Recommendations:  Cristo Mcfarland  Further Equipment Recommendations for Discharge:  TBD       SUBJECTIVE:   Patient stated That feels good.   (back brace). OBJECTIVE DATA SUMMARY:   Critical Behavior:  Neurologic State: Alert, Appropriate for age, Eyes open spontaneously  Orientation Level: Oriented to person, Oriented to place, Oriented to situation, Disoriented to time  Cognition: Follows commands  Safety/Judgement: Decreased awareness of environment, Decreased awareness of need for assistance, Decreased awareness of need for safety, Fall prevention, Home safety  Functional Mobility Training:  Bed Mobility:     Supine to Sit: Stand-by asssistance                          Transfers:  Sit to Stand: Minimum assistance;Contact guard assistance  Stand to Sit: Contact guard assistance                             Balance:  Sitting: Intact; With support  Standing - Static: Fair  Standing - Dynamic : Fair  Ambulation/Gait Training:  Distance (ft): 50 Feet (ft)  Assistive Device: Brace/Splint; Walker;Gait belt  Ambulation - Level of Assistance: Minimal assistance        Gait Abnormalities: Path deviations;Scissoring        Base of Support: Narrowed     Speed/Hoa: Fluctuations                                   Activity Tolerance:   Fair.   After treatment:   [X] Patient left in no apparent distress sitting up in chair  [ ] Patient left in no apparent distress in bed  [X] Call bell left within reach  [ ] Nursing notified  [ ] Caregiver present  [X] chair alarm activated      COMMUNICATION/COLLABORATION:   The patients plan of care was discussed with: Registered Nurse     Ramirez Benson PTA   Time Calculation: 23 mins

## 2017-07-05 LAB
ALBUMIN SERPL BCP-MCNC: 2.8 G/DL (ref 3.5–5)
ALBUMIN/GLOB SERPL: 0.9 {RATIO} (ref 1.1–2.2)
ALP SERPL-CCNC: 94 U/L (ref 45–117)
ALT SERPL-CCNC: 15 U/L (ref 12–78)
ANION GAP BLD CALC-SCNC: 10 MMOL/L (ref 5–15)
AST SERPL W P-5'-P-CCNC: 16 U/L (ref 15–37)
BASOPHILS # BLD AUTO: 0 K/UL (ref 0–0.1)
BASOPHILS # BLD: 0 % (ref 0–1)
BILIRUB SERPL-MCNC: 0.3 MG/DL (ref 0.2–1)
BUN SERPL-MCNC: 30 MG/DL (ref 6–20)
BUN/CREAT SERPL: 9 (ref 12–20)
CALCIUM SERPL-MCNC: 8.2 MG/DL (ref 8.5–10.1)
CHLORIDE SERPL-SCNC: 108 MMOL/L (ref 97–108)
CO2 SERPL-SCNC: 23 MMOL/L (ref 21–32)
CREAT SERPL-MCNC: 3.32 MG/DL (ref 0.55–1.02)
EOSINOPHIL # BLD: 0.2 K/UL (ref 0–0.4)
EOSINOPHIL NFR BLD: 3 % (ref 0–7)
ERYTHROCYTE [DISTWIDTH] IN BLOOD BY AUTOMATED COUNT: 15.6 % (ref 11.5–14.5)
GLOBULIN SER CALC-MCNC: 3.1 G/DL (ref 2–4)
GLUCOSE SERPL-MCNC: 85 MG/DL (ref 65–100)
HCT VFR BLD AUTO: 33.3 % (ref 35–47)
HCV AB SERPL QL IA: NONREACTIVE
HCV COMMENT,HCGAC: NORMAL
HGB BLD-MCNC: 10.8 G/DL (ref 11.5–16)
IGA SERPL-MCNC: 206 MG/DL (ref 64–422)
IGG SERPL-MCNC: 533 MG/DL (ref 700–1600)
IGM SERPL-MCNC: 53 MG/DL (ref 26–217)
KAPPA LC FREE SER-MCNC: 43.7 MG/L (ref 3.3–19.4)
KAPPA LC FREE/LAMBDA FREE SER: 1.35 {RATIO} (ref 0.26–1.65)
LAMBDA LC FREE SERPL-MCNC: 32.4 MG/L (ref 5.7–26.3)
LYMPHOCYTES # BLD AUTO: 16 % (ref 12–49)
LYMPHOCYTES # BLD: 1.1 K/UL (ref 0.8–3.5)
MCH RBC QN AUTO: 27.7 PG (ref 26–34)
MCHC RBC AUTO-ENTMCNC: 32.4 G/DL (ref 30–36.5)
MCV RBC AUTO: 85.4 FL (ref 80–99)
MONOCYTES # BLD: 1 K/UL (ref 0–1)
MONOCYTES NFR BLD AUTO: 14 % (ref 5–13)
NEUTS SEG # BLD: 4.6 K/UL (ref 1.8–8)
NEUTS SEG NFR BLD AUTO: 67 % (ref 32–75)
PLATELET # BLD AUTO: 222 K/UL (ref 150–400)
POTASSIUM SERPL-SCNC: 4 MMOL/L (ref 3.5–5.1)
PROT PATTERN SERPL IFE-IMP: ABNORMAL
PROT SERPL-MCNC: 5.9 G/DL (ref 6.4–8.2)
RBC # BLD AUTO: 3.9 M/UL (ref 3.8–5.2)
RPR SER QL: NONREACTIVE
SODIUM SERPL-SCNC: 141 MMOL/L (ref 136–145)
WBC # BLD AUTO: 6.9 K/UL (ref 3.6–11)

## 2017-07-05 PROCEDURE — 97535 SELF CARE MNGMENT TRAINING: CPT

## 2017-07-05 PROCEDURE — 85025 COMPLETE CBC W/AUTO DIFF WBC: CPT | Performed by: FAMILY MEDICINE

## 2017-07-05 PROCEDURE — 74011250637 HC RX REV CODE- 250/637: Performed by: INTERNAL MEDICINE

## 2017-07-05 PROCEDURE — 95816 EEG AWAKE AND DROWSY: CPT | Performed by: PSYCHIATRY & NEUROLOGY

## 2017-07-05 PROCEDURE — L0627 LO SAG RI AN/POS PNL PRE CST: HCPCS

## 2017-07-05 PROCEDURE — 65660000000 HC RM CCU STEPDOWN

## 2017-07-05 PROCEDURE — 36415 COLL VENOUS BLD VENIPUNCTURE: CPT | Performed by: FAMILY MEDICINE

## 2017-07-05 PROCEDURE — 80053 COMPREHEN METABOLIC PANEL: CPT | Performed by: FAMILY MEDICINE

## 2017-07-05 PROCEDURE — 74011250637 HC RX REV CODE- 250/637: Performed by: PSYCHIATRY & NEUROLOGY

## 2017-07-05 PROCEDURE — 74011000258 HC RX REV CODE- 258: Performed by: INTERNAL MEDICINE

## 2017-07-05 PROCEDURE — 74011250636 HC RX REV CODE- 250/636: Performed by: INTERNAL MEDICINE

## 2017-07-05 RX ADMIN — CEFTRIAXONE SODIUM 1 G: 1 INJECTION, POWDER, FOR SOLUTION INTRAMUSCULAR; INTRAVENOUS at 08:40

## 2017-07-05 RX ADMIN — Medication 10 ML: at 21:45

## 2017-07-05 RX ADMIN — TRAZODONE HYDROCHLORIDE 25 MG: 50 TABLET ORAL at 21:44

## 2017-07-05 RX ADMIN — HEPARIN SODIUM 5000 UNITS: 5000 INJECTION, SOLUTION INTRAVENOUS; SUBCUTANEOUS at 21:44

## 2017-07-05 RX ADMIN — SODIUM CHLORIDE 75 ML/HR: 900 INJECTION, SOLUTION INTRAVENOUS at 07:07

## 2017-07-05 RX ADMIN — Medication 10 ML: at 05:19

## 2017-07-05 RX ADMIN — Medication 10 ML: at 14:29

## 2017-07-05 RX ADMIN — HEPARIN SODIUM 5000 UNITS: 5000 INJECTION, SOLUTION INTRAVENOUS; SUBCUTANEOUS at 12:49

## 2017-07-05 RX ADMIN — HEPARIN SODIUM 5000 UNITS: 5000 INJECTION, SOLUTION INTRAVENOUS; SUBCUTANEOUS at 05:18

## 2017-07-05 RX ADMIN — VENLAFAXINE HYDROCHLORIDE 225 MG: 150 CAPSULE, EXTENDED RELEASE ORAL at 08:40

## 2017-07-05 RX ADMIN — SODIUM CHLORIDE 75 ML/HR: 900 INJECTION, SOLUTION INTRAVENOUS at 21:44

## 2017-07-05 RX ADMIN — BUPROPION HYDROCHLORIDE 150 MG: 150 TABLET, FILM COATED, EXTENDED RELEASE ORAL at 08:40

## 2017-07-05 RX ADMIN — LEVOTHYROXINE SODIUM 50 MCG: 50 TABLET ORAL at 06:18

## 2017-07-05 NOTE — PROGRESS NOTES
Problem: Self Care Deficits Care Plan (Adult)  Goal: *Acute Goals and Plan of Care (Insert Text)  Occupational Therapy Goals  Initiated 7/3/2017  1. Patient will perform grooming with supervision/set-up sitting EOB within 7 day(s). 4. Patient will perform toilet transfers with moderate assistance to BCS within 7 day(s). 5. Patient will perform all aspects of toileting with moderate assistance within 7 day(s). 6. Patient will participate in upper extremity therapeutic exercise/activities with supervision/set-up for 10 minutes within 7 day(s). OCCUPATIONAL THERAPY TREATMENT  Patient: Jose Francisco Bradley [de-identified]80 y.o. female)  Date: 7/5/2017  Diagnosis: NIMCO (acute kidney injury) ([facility] [address] <principal problem not specified>       Precautions: DNR, Fall      ASSESSMENT:  Patient received in bed, call bell pressed, requesting to use commode. Patient assisted with transfer to commode and patient required verbal cues to maintain safe techniques for back pain. Patient able to perform toileting and toilet transfers with minimum assistance today. Goals not upgraded as patient performs transfer only to [facility] vs walking distance to commode in bathroom. Patient requires agreeable to sitting in chair after toileting tasks completed and brace donned with moderate assistance. Patient will continue to benefit from OT services to increase safety and independence. Progression toward goals:  [ ]       Improving appropriately and progressing toward goals  [ ]       Improving slowly and progressing toward goals  [ ]       Not making progress toward goals and plan of care will be adjusted       PLAN:  Patient continues to benefit from skilled intervention to address the above impairments. Continue treatment per established plan of care. Discharge Recommendations:  Rehab  Further Equipment Recommendations for Discharge:  None noted        SUBJECTIVE:   Patient stated I need to go to the bathroom. Can I go by myself? Anisha Hawkins  Patient had hit call bell to call for assist.    OBJECTIVE DATA SUMMARY:   Cognitive/Behavioral Status:  Neurologic State: Alert  Orientation Level: Oriented to place;Oriented to person  Cognition: Follows commands  Perception: Appears intact  Perseveration: No perseveration noted  Safety/Judgement: Decreased insight into deficits; Decreased awareness of need for safety     Functional Mobility and Transfers for ADLs:  Bed Mobility:  Supine to Sit: Stand-by asssistance  Scooting: Contact guard assistance     Transfers:  Sit to Stand: Contact guard assistance  Functional Transfers  Toilet Transfer : Minimum assistance  Cues: Physical assistance;Verbal cues provided  Adaptive Equipment: Bedside commode;Walker (comment)     Balance:  Sitting: Intact; With support  Standing: Impaired; With support  Standing - Static: Fair  Standing - Dynamic : Fair     ADL Intervention:     Dressing brace: Moderate assistance. Patient instructed and demonstrated to don/doff velcro on brace. Patient instructed to align at stomach and center of back, then fasten with velcro closure. Once complete, patient to feel to find bilateral loop/string to draw brace snug. Patient verbalized and demonstrated understanding, but likely to require reinforcement. patient educated for safe technique due to back pain. Patient with attempt to lean forward to perform and increased twisting at back. Instructed patient for keeping back straight to avoid extreme movement that may aggravate back pain. Patient verbalize understanding but likely will require reinforcement of tasks. Toileting  Toileting Assistance: Minimum assistance  Bladder Hygiene: Supervision/set-up  Clothing Management: Minimum assistance  Cues: Verbal cues provided;Physical assistance for pants down  Adaptive Equipment: Walker (BSC)     Cognitive Retraining  Safety/Judgement: Decreased insight into deficits; Decreased awareness of need for safety     Neuro Re-Education:           Therapeutic Exercises:      Pain:  Pain Scale 1: Numeric (0 - 10)  Pain Intensity 1: 0              Activity Tolerance:   VSS  Please refer to the flowsheet for vital signs taken during this treatment.   After treatment:   [X] Patient left in no apparent distress sitting up in chair  [ ] Patient left in no apparent distress in bed  [X] Call bell left within reach  [X] Nursing notified  [ ] Caregiver present  [X] Bed alarm activated      COMMUNICATION/COLLABORATION:   The patients plan of care was discussed with: Physical Therapist and Registered Nurse     PHILIPP Padrno/L  Time Calculation: 15 mins

## 2017-07-05 NOTE — PROGRESS NOTES
[facility]  YOB: 1932          Assessment & Plan:   1. NIMCO  - UA: Mild protein,   - US: no hydro  - Cr better with Volume  - No RRT needed  - Etiology remains elusive     2. Leukocytosis  - on CTX  3. HTN  - Not on meds       Subjective:   CC:arf  HPI: Patient seen   NIMCO is better, no nvd. No cp/sob       Current Facility-Administered Medications   Medication Dose Route Frequency    buPROPion XL (WELLBUTRIN XL) tablet 150 mg  150 mg Oral DAILY    traZODone (DESYREL) tablet 25 mg  25 mg Oral QHS    haloperidol lactate (HALDOL) injection 1 mg  1 mg IntraVENous Q6H PRN    haloperidol lactate (HALDOL) injection 1 mg  1 mg IntraMUSCular Q6H PRN    sodium chloride (NS) flush 5-10 mL  5-10 mL IntraVENous Q8H    sodium chloride (NS) flush 5-10 mL  5-10 mL IntraVENous PRN    naloxone (NARCAN) injection 0.4 mg  0.4 mg IntraVENous PRN    levothyroxine (SYNTHROID) tablet 50 mcg  50 mcg Oral ACB    venlafaxine-SR (EFFEXOR-XR) capsule 225 mg  225 mg Oral DAILY    cefTRIAXone (ROCEPHIN) 1 g in 0.9% sodium chloride (MBP/ADV) 50 mL  1 g IntraVENous Q24H    traMADol (ULTRAM) tablet 50 mg  50 mg Oral Q6H PRN    0.9% sodium chloride infusion  75 mL/hr IntraVENous CONTINUOUS    heparin (porcine) injection 5,000 Units  5,000 Units SubCUTAneous Q8H    ondansetron (ZOFRAN) injection 4 mg  4 mg IntraVENous Q4H PRN          Objective:     Vitals:  Blood pressure 145/68, pulse 97, temperature 97.5 °F (36.4 °C), resp. rate 18, height 5' 3\" (1.6 m), weight 70.3 kg (155 lb), SpO2 96 %, not currently breastfeeding.   Temp (24hrs), Av.2 °F (36.8 °C), Min:97.5 °F (36.4 °C), Max:98.7 °F (37.1 °C)      Intake and Output:      1901 -  0700  In: -   Out: 400 [Urine:400]    Physical Exam:                Patient is intubated:  no    Physical Examination:   GENERAL ASSESSMENT: NAD  HEENT:Nontraumatic   CHEST: CTA  HEART: S1S2  ABDOMEN: Soft,NT,  :Rivera: n  EXTREMITY: EDEMA  NEURO:Grossly non focal          ECG/rhythm:    Data Review      No results for input(s): TNIPOC in the last 72 hours. No lab exists for component: ITNL   No results for input(s): CPK, CKMB, TROIQ in the last 72 hours. Recent Labs      07/05/17   0402  07/04/17   0335  07/03/17   1035   NA  141  141  139   K  4.0  4.3  4.3   CL  108  107  106   CO2  23  19*  23   BUN  30*  34*  39*   CREA  3.32*  3.44*  3.78*   GLU  85  89  86   MG   --   1.7  1.5*   CA  8.2*  8.5  8.3*   ALB  2.8*  2.8*  3.2*   WBC  6.9  7.4  9.0   HGB  10.8*  11.2*  12.1   HCT  33.3*  33.8*  36.7   PLT  222  220  216      No results for input(s): INR, PTP, APTT in the last 72 hours. No lab exists for component: INREXT, INREXT  Needs: urine analysis, urine sodium, protein and creatinine  Lab Results   Component Value Date/Time    Sodium urine, random 55 07/02/2017 02:41 AM    Creatinine, urine 187.78 12/21/2014 03:50 PM         Discussed with:  Family, Colleague    : Eloisa Minor MD  7/5/2017        [facility]:  [email or web address]  Ana Luisa Sterling office:  [address] [place] [address], [address] [place] [address] [address]  [place], [address] [place] [address]  Phone: [phone]  Fax :     [phone]    [place] office:  [address] [facility], [address] [facility]  Phone - [phone]  Fax - [phone]

## 2017-07-05 NOTE — PROGRESS NOTES
Occupational therapy note:  Orders received, chart reviewed. Attempted to see patient for OT treatment session. Patient received in bed sleeping. She is easily aroused to therapist voice and therapist provided introduction. Patient reports she \"just wants to sleep right now, because (she) did not sleep overnight. \". Patient continues to decline offers for OOB or EOB activity. Patient pleasant and agreeable to have therapist follow up at later time. Stephanie Oneal, MS OTR/L

## 2017-07-05 NOTE — CONSULTS
Charanjit Li edi [address], [facility]   [address] [facility]       Name:  Fco Hemphill   MR#:  [id number]   :  1932   Account #:  [de-identified]    Date of Consultation:  2017   Date of Adm:  2017       REQUESTING PHYSICIAN: Dr. David Razo    Thank you for asking us to see the patient in neurologic consultation   regarding dementia evaluation. She is unable to give any meaningful   history. I have reviewed her chart. HISTORY OF PRESENT ILLNESS: This is an 70-year-old lady who   was admitted to the hospital with low back pain after a fall. In any   regard, she was found to have leukocytosis and acute kidney injury. She had apparent urinary tract infection as well. She had a spell of   confusion as well and some agitation. She was seen by Dr. An Body of   Psychiatry. Per Dr. Deniz Valenzuela history, she was able to speak with the   family and learn that the patient has had cognitive decline since . She would forget names and relationships with family members and   would misplace items in the house. She began isolating herself. She   does not handle her own medications or financial affairs. She was   being treated for depression. She had never been worked up for   dementia and had seen a neurologist for migraines, but never for any   dementia evaluation. She felt that her irritability and mood was being   handled with treatment of depressive symptoms, since she adjusted   her psychiatric medications, but recommended being evaluated for   dementia. She did order labs, including B12 that was normal. She had a   folate that was normal. RPR is in process. Thyroid function has been   normal. She had a head CT that has been unrevealing. The RPR   again, is process. She had an EEG today that I read that was a bit   slow. She has apparently returned back to her baseline. Her kidney function   is returning as well.      PAST MEDICAL HISTORY: Magi Servin to include arthritis, asthma,   depression, hypertension, history of PE, vertigo, depression,   hysterectomy, cataract surgery, migraine. MEDICATIONS   Her medicines on admission were:    1. Neurontin 100 mg t.i.d.    2. Tylenol with codeine p.r.n.   3. Wellbutrin. 4. Effexor. 5. Levothroid. 6. Albuterol. ALLERGIES   NOTED TO BE TO:    1. PERCOCET. 2. PENICILLIN. 3. SULFA. SOCIAL HISTORY: Chalmers Schaumann to not smoke or drink. Again, family   apparently handles her affairs. She lives with family. FAMILY HISTORY: Unknown. REVIEW OF SYSTEMS   The patient gives me no complaint. PHYSICAL EXAM   GENERAL: She is sitting up in bed, quite comfortable appearing,   interactive. VITAL SIGNS: She is afebrile. Blood pressure 154/81, oxygen   saturation 98% on room air, pulse is 80. HEENT: She is anicteric. Oropharynx is clear and moist. She has no   edema. HEART:  Regular. NEUROLOGIC: She is awake, alert and oriented to the hospital, but   cannot tell me which one. When I give her list, she tells me she is at [facility]. She tells me it is July. She believes it is the 17th and the year is   2017 as well. She tells me the president is [facility]. When I ask her to   rethink that, she says, \"No, Darcie Oppenheim is the president. \" She cannot   tell me the holiday that has just passed. She tells me all kinds of bad   things are going on in the news. She says there are 5 quarters in 1.75. She has no right-left confusion. She follows 2-step commands. She has    without nystagmus. Symmetric face and facial sensation. Tongue and   palate are midline. She has age-related paratonia. No abnormal   movement. She has no pronation or drift. She resists fully in the upper   and lower extremities in all muscles to direct testing. Reflexes are   symmetrical. No ataxia. Gait deferred. STUDIES: No studies. IMPRESSION: I agree, she likely has underlying dementia.  At this   point, given her recent renal issues, I would not start any memory-  modifying medications while she is here in the hospital. Her CT scan   shows nothing acute. The RPR is pending and certainly can be   followed up as an outpatient. B12 and thyroid function are acceptable. At this juncture, I would have her follow up with us in the office and   once she is back in her home environment, then certainly we could   discuss starting memory-modifying medicines. Would probably start   acetylcholine esterase inhibitor first, and then would likely, if we added   on Namenda, would do that on the lower end, if at all, given her issue   with the kidneys. In any regard, would let the kidneys normalize before   we started anything. We will be happy to see her again in the office, or certainly, she could   follow with her own neurologist, if she still has ties there. Thank you for your request of consultation.         Luz Finn MD      SLE / THS   D:  07/05/2017   14:32   T:  07/05/2017   15:10   Job #:  [id number]

## 2017-07-05 NOTE — PROGRESS NOTES
Psychiatric Follow-Up  Dr. Luca Garcia, Psychiatrist, [facility]    Maria Fernanda WETZEL Self-Uml[place]varun female 80 y.o. Interval History:  Patient's mental status significantly improved. No paranoia. Not irritably, pleasant, more oriented and cooperative. She does not remember being confused or irritable. Memory deficits are obvious, however. No as needed Haldol in 24 hours. B12/Folate wnl. RPR was not run - called lab and they will redo tonight. Neuro consult pending. ROS:  Gen - alert patient, NAD. Psych - No SI/HI/Psychotic/Manic Symptoms. Current Inpatient Medications:  Current Facility-Administered Medications   Medication Dose Route Frequency    buPROPion XL (WELLBUTRIN XL) tablet 150 mg  150 mg Oral DAILY    traZODone (DESYREL) tablet 25 mg  25 mg Oral QHS    haloperidol lactate (HALDOL) injection 1 mg  1 mg IntraVENous Q6H PRN    haloperidol lactate (HALDOL) injection 1 mg  1 mg IntraMUSCular Q6H PRN    sodium chloride (NS) flush 5-10 mL  5-10 mL IntraVENous Q8H    sodium chloride (NS) flush 5-10 mL  5-10 mL IntraVENous PRN    naloxone (NARCAN) injection 0.4 mg  0.4 mg IntraVENous PRN    levothyroxine (SYNTHROID) tablet 50 mcg  50 mcg Oral ACB    venlafaxine-SR (EFFEXOR-XR) capsule 225 mg  225 mg Oral DAILY    cefTRIAXone (ROCEPHIN) 1 g in 0.9% sodium chloride (MBP/ADV) 50 mL  1 g IntraVENous Q24H    traMADol (ULTRAM) tablet 50 mg  50 mg Oral Q6H PRN    0.9% sodium chloride infusion  75 mL/hr IntraVENous CONTINUOUS    heparin (porcine) injection 5,000 Units  5,000 Units SubCUTAneous Q8H    ondansetron (ZOFRAN) injection 4 mg  4 mg IntraVENous Q4H PRN        Mental Status Examination:  Level of Consciousness:  Alert  Orientation:  Oriented to \"hospital\" but not name of hospital, self, year, July, doesn't know date or that [place] day was yesterday, doesn't know why she is here.   Appearance:  Normal grooming for situation  Cooperation:  Cooperative  Speech:  Normal rate/rhythm  Mood:  Euthymic  Affect:  Normal range  Thought process:  Linear/Goal-directed  Suicidal Ideation:  None  Homicidal Ideation:  None  Perceptual Disturbances:  None endorsed  Memory:  Impaired grossly and by history  Judgement/Insight:  Fair to poor  Attention/Concentration:  Adequate for interview  Psychomotor Agitation:  None    Vital Signs:    Visit Vitals    /68 (BP 1 Location: Right arm, BP Patient Position: At rest)    Pulse 97    Temp 97.5 °F (36.4 °C)    Resp 18    Ht 5' 3\" (1.6 m)    Wt 70.3 kg (155 lb)    SpO2 96%    Breastfeeding No    BMI 27.46 kg/m2       Pertinent Labs/Studies:    Recent Results (from the past 24 hour(s))   METABOLIC PANEL, COMPREHENSIVE    Collection Time: 07/05/17  4:02 AM   Result Value Ref Range    Sodium 141 136 - 145 mmol/L    Potassium 4.0 3.5 - 5.1 mmol/L    Chloride 108 97 - 108 mmol/L    CO2 23 21 - 32 mmol/L    Anion gap 10 5 - 15 mmol/L    Glucose 85 65 - 100 mg/dL    BUN 30 (H) 6 - 20 MG/DL    Creatinine 3.32 (H) 0.55 - 1.02 MG/DL    BUN/Creatinine ratio 9 (L) 12 - 20      GFR est AA 16 (L) >60 ml/min/1.73m2    GFR est non-AA 13 (L) >60 ml/min/1.73m2    Calcium 8.2 (L) 8.5 - 10.1 MG/DL    Bilirubin, total 0.3 0.2 - 1.0 MG/DL    ALT (SGPT) 15 12 - 78 U/L    AST (SGOT) 16 15 - 37 U/L    Alk. phosphatase 94 45 - 117 U/L    Protein, total 5.9 (L) 6.4 - 8.2 g/dL    Albumin 2.8 (L) 3.5 - 5.0 g/dL    Globulin 3.1 2.0 - 4.0 g/dL    A-G Ratio 0.9 (L) 1.1 - 2.2     CBC WITH AUTOMATED DIFF    Collection Time: 07/05/17  4:02 AM   Result Value Ref Range    WBC 6.9 3.6 - 11.0 K/uL    RBC 3.90 3.80 - 5.20 M/uL    HGB 10.8 (L) 11.5 - 16.0 g/dL    HCT 33.3 (L) 35.0 - 47.0 %    MCV 85.4 80.0 - 99.0 FL    MCH 27.7 26.0 - 34.0 PG    MCHC 32.4 30.0 - 36.5 g/dL    RDW 15.6 (H) 11.5 - 14.5 %    PLATELET 486 282 - 213 K/uL    NEUTROPHILS 67 32 - 75 %    LYMPHOCYTES 16 12 - 49 %    MONOCYTES 14 (H) 5 - 13 %    EOSINOPHILS 3 0 - 7 %    BASOPHILS 0 0 - 1 %    ABS. NEUTROPHILS 4.6 1.8 - 8.0 K/UL    ABS. LYMPHOCYTES 1.1 0.8 - 3.5 K/UL    ABS. MONOCYTES 1.0 0.0 - 1.0 K/UL    ABS. EOSINOPHILS 0.2 0.0 - 0.4 K/UL    ABS. BASOPHILS 0.0 0.0 - 0.1 K/UL          Impression:  80year old woman with long-standing and very atypical cycling mood/depressive symptoms  along with what appears to be a Major neurocognitive disorder (dementia). I am recommending neurology consultation to evaluated her symptoms of dementia and consideration of the work up needed (MRI, EEG, etc) - this is pending. B12/Folate/TSH is wnl; RPR is pending. Mood, irritability, and behavior has improved with medication changes as well as medical treatment (especially of UTI). It is likely that part of thinking and mood exacerbation was a delirium caused by UTI which is now resolved.     Psychiatric Diagnoses:  Unspecified depressive disorder, Unspecified major neurocognitive disorder R41.9, Delirium due to multiple etiologies resolved     Plan:    1. I recommend a neurology consultation for dementia work up. I ordered initial labs as above. RPR is still pending - this will need to be followed up by primary team as I am off service. 2.  For mood and irritability in addition to sleep disturbance, continue Effexor XR, Wellbutrin XL and Trazodone 25 mg po qhs. I would recommend geriatric psychiatry follow up as an outpatient.     30 minutes spent on floor. >50% in counseling/coordination of care activities as described above. I am off service after today and will sign off. Please call Dr. Ritu Oropeza starting tomorrow with further concerns. Olivia Valentino.  ARIEL Renteria.

## 2017-07-05 NOTE — PROGRESS NOTES
Bedside and Verbal shift change report given to Juan (oncoming nurse) by Imer Palacios (offgoing nurse). Report included the following information SBAR, Kardex, Intake/Output, MAR, Accordion, Recent Results and Med Rec Status.

## 2017-07-05 NOTE — PROGRESS NOTES
Daily Progress Note: 7/5/2017  Maria Fernanda RASHARD Self-Kaylan  Jed Mccoy MD    Assessment/Plan:   NIMCO: unclear etiology.  Has mild IVVD  --Cr improving slowly  --normal renal US  --nephrology followed     UTI -- culture shows gram neg rods - ESCHERICHIA FERGUSONII  --continue rocephin - sensitive    Back pain - superior endplate depression, mild L3  --pain control - consider ortho consult       HTN: watch  -- not on meds       Depression/Paranoia/agitation --  Dementia with behavioral disturbance likely worsened by UTI  --Psych has seen, recommend neuro consult and meds have been adjusted  --haldol prn agitation  --improved with tx of UTI    Hypothyroidism  -- cont supplement       History of PE: not on anticoagulation  -- DVT ppx on heparin SQ        Weakness / Fall: lives with grand-daughter   -- PT / Savilla Flatter, fall precautions         Problem List:  Problem List as of 7/5/2017  Date Reviewed: 7/2/2017          Codes Class Noted - Resolved    UTI (urinary tract infection) ICD-10-CM: N39.0  ICD-9-CM: 599.0  7/2/2017 - Present        Acute respiratory failure ([facility] ICD-10-CM: J96.00  ICD-9-CM: 518.81  12/21/2014 - Present        PE (pulmonary embolism) ICD-10-CM: I26.99  ICD-9-CM: 415.19  12/11/2014 - Present        NIMCO (acute kidney injury) ([facility] ICD-10-CM: N17.9  ICD-9-CM: 584.9  12/11/2014 - Present        Renal lesion ICD-10-CM: N28.9  ICD-9-CM: 593.9  11/5/2014 - Present        Autoimmune hemolytic anemia ([facility] ICD-10-CM: D59.1  ICD-9-CM: 283.0  11/5/2014 - Present        Leukocytosis ICD-10-CM: C89.235  ICD-9-CM: 288.60  10/25/2014 - Present        HTN (hypertension) ICD-10-CM: I10  ICD-9-CM: 401.9  10/19/2014 - Present        Depression ICD-10-CM: F32.9  ICD-9-CM: 014  10/19/2014 - Present        Hypothyroidism ICD-10-CM: E03.9  ICD-9-CM: 244.9  10/19/2014 - Present        Diarrhea ICD-10-CM: R19.7  ICD-9-CM: 787.91  10/19/2014 - Present        Leukopenia ICD-10-CM: R51.895  ICD-9-CM: 288.50  10/19/2014 - Present Anemia ICD-10-CM: D64.9  ICD-9-CM: 285.9  10/19/2014 - Present        Transaminitis ICD-10-CM: R74.0  ICD-9-CM: 790.4  10/19/2014 - Present        Hyperbilirubinemia ICD-10-CM: E80.6  ICD-9-CM: 782.4  10/19/2014 - Present        Weakness ICD-10-CM: R53.1  ICD-9-CM: 780.79  10/18/2014 - Present              Subjective:   H&P: Ms. Esa Hopkins is a 80 y.o. female w/ hx of HTN, hypothyroidism, depression, prior PE who presents with low back pain after fall. Per family at bedside, pt has been very weak in recent weeks, intermittent, moderate, worsening, associated with falling at home. Yesterday, pt was walking to bathroom and fell to the floor on her back. Now with severe back pain. No weakness or numbness. No fevers/chills, cough, CP, SOB. Reports nausea. ED workup showed severe NIMCO, leukocytosis. 7/2:  Patient c/o at least a week of nausea, not eating much. No abd. Pain, bowels have been OK. She denies dysuria. She has had chronic back pain, and now worse after her fall. This AM she still is with back pain but improved, she just ate some breakfast and feels like it will probably stay down. 7/3:  Nausea is some better. She wants to see ortho for her back pain. Creat at 3 yesterday. AM labs pending. Nurses report she is not letting them draw labs or start IV. Sees Neuro outpt for dementia. 7/4: Haldol last night around 10pm for agitation/paranoia -- she has been sleeping since. Didn't have her dinner last night. When I wake her up and introduce myself this morning she seems to recognize me, she does not remember her paranoid activities. She know she is in the hospital and admits to back pain. 7/5: Rested comfortably overnight. She has no complaints. She speaks rationally and not delusional at this time. She knows she is in the hospital but not which one. It turns out she has not had dementia work up in past.  Altered mental status likely due to UTI and underlying dementia.  Culture growing [address] which is sensitive to Rocephin. Creat about the same and will need monitoring. Hopefully home tomorrow with outpt work up of dementia but may start namenda at [place] if Neuro agrees. Review of Systems:   Review of systems not obtained due to patient factors. Objective:   Physical Exam:     Visit Vitals    /54    Pulse 79    Temp 98.6 °F (37 °C)    Resp 19    Ht 5' 3\" (1.6 m)    Wt 70.3 kg (155 lb)    SpO2 94%    Breastfeeding No    BMI 27.46 kg/m2      O2 Device: Room air    Temp (24hrs), Av.4 °F (36.9 °C), Min:97.7 °F (36.5 °C), Max:98.7 °F (37.1 °C)        701 -  190  In: -   Out: 400 [Urine:400]    General:  Alert, cooperative, no distress, appears stated age. Head:  Normocephalic, without obvious abnormality, atraumatic. Eyes:  Conjunctivae/corneas clear. PERRL, EOMs intact. Neck: Supple, symmetrical, trachea midline   Lungs:   Clear to auscultation bilaterally. Heart:  Regular rate and rhythm, S1, S2 normal, no murmur, click, rub or gallop. Abdomen:   Soft, non-tender. Bowel sounds normal. No masses,  No organomegaly. Extremities: Extremities normal, atraumatic, no cyanosis or edema. No calf tenderness or cords. Pulses: 2+ and symmetric all extremities. Skin: Skin color, texture, turgor normal. No rashes or lesions   Neurologic: CNII-XII intact. Alert and oriented X 2 today [person,palce] - waxes and wanes but improved.         Data Review:       Recent Days:  Recent Labs      17   0402  17   0335  17   1035   WBC  6.9  7.4  9.0   HGB  10.8*  11.2*  12.1   HCT  33.3*  33.8*  36.7   PLT  222  220  216     Recent Labs      17   0402  17   0335  17   1035   NA  141  141  139   K  4.0  4.3  4.3   CL  108  107  106   CO2  23  19*  23   GLU  85  89  86   BUN  30*  34*  39*   CREA  3.32*  3.44*  3.78*   CA  8.2*  8.5  8.3*   MG   --   1.7  1.5*   ALB  2.8*  2.8*  3.2*   SGOT  16  12*  20   ALT  15 14  18     No results for input(s): PH, PCO2, PO2, HCO3, FIO2 in the last 72 hours. 24 Hour Results:  Recent Results (from the past 24 hour(s))   METABOLIC PANEL, COMPREHENSIVE    Collection Time: 07/05/17  4:02 AM   Result Value Ref Range    Sodium 141 136 - 145 mmol/L    Potassium 4.0 3.5 - 5.1 mmol/L    Chloride 108 97 - 108 mmol/L    CO2 23 21 - 32 mmol/L    Anion gap 10 5 - 15 mmol/L    Glucose 85 65 - 100 mg/dL    BUN 30 (H) 6 - 20 MG/DL    Creatinine 3.32 (H) 0.55 - 1.02 MG/DL    BUN/Creatinine ratio 9 (L) 12 - 20      GFR est AA 16 (L) >60 ml/min/1.73m2    GFR est non-AA 13 (L) >60 ml/min/1.73m2    Calcium 8.2 (L) 8.5 - 10.1 MG/DL    Bilirubin, total 0.3 0.2 - 1.0 MG/DL    ALT (SGPT) 15 12 - 78 U/L    AST (SGOT) 16 15 - 37 U/L    Alk. phosphatase 94 45 - 117 U/L    Protein, total 5.9 (L) 6.4 - 8.2 g/dL    Albumin 2.8 (L) 3.5 - 5.0 g/dL    Globulin 3.1 2.0 - 4.0 g/dL    A-G Ratio 0.9 (L) 1.1 - 2.2     CBC WITH AUTOMATED DIFF    Collection Time: 07/05/17  4:02 AM   Result Value Ref Range    WBC 6.9 3.6 - 11.0 K/uL    RBC 3.90 3.80 - 5.20 M/uL    HGB 10.8 (L) 11.5 - 16.0 g/dL    HCT 33.3 (L) 35.0 - 47.0 %    MCV 85.4 80.0 - 99.0 FL    MCH 27.7 26.0 - 34.0 PG    MCHC 32.4 30.0 - 36.5 g/dL    RDW 15.6 (H) 11.5 - 14.5 %    PLATELET 805 573 - 872 K/uL    NEUTROPHILS 67 32 - 75 %    LYMPHOCYTES 16 12 - 49 %    MONOCYTES 14 (H) 5 - 13 %    EOSINOPHILS 3 0 - 7 %    BASOPHILS 0 0 - 1 %    ABS. NEUTROPHILS 4.6 1.8 - 8.0 K/UL    ABS. LYMPHOCYTES 1.1 0.8 - 3.5 K/UL    ABS. MONOCYTES 1.0 0.0 - 1.0 K/UL    ABS. EOSINOPHILS 0.2 0.0 - 0.4 K/UL    ABS.  BASOPHILS 0.0 0.0 - 0.1 K/UL       Medications reviewed  Current Facility-Administered Medications   Medication Dose Route Frequency    buPROPion XL (WELLBUTRIN XL) tablet 150 mg  150 mg Oral DAILY    traZODone (DESYREL) tablet 25 mg  25 mg Oral QHS    haloperidol lactate (HALDOL) injection 1 mg  1 mg IntraVENous Q6H PRN    haloperidol lactate (HALDOL) injection 1 mg  1 mg IntraMUSCular Q6H PRN    sodium chloride (NS) flush 5-10 mL  5-10 mL IntraVENous Q8H    sodium chloride (NS) flush 5-10 mL  5-10 mL IntraVENous PRN    naloxone (NARCAN) injection 0.4 mg  0.4 mg IntraVENous PRN    levothyroxine (SYNTHROID) tablet 50 mcg  50 mcg Oral ACB    venlafaxine-SR (EFFEXOR-XR) capsule 225 mg  225 mg Oral DAILY    cefTRIAXone (ROCEPHIN) 1 g in 0.9% sodium chloride (MBP/ADV) 50 mL  1 g IntraVENous Q24H    traMADol (ULTRAM) tablet 50 mg  50 mg Oral Q6H PRN    0.9% sodium chloride infusion  75 mL/hr IntraVENous CONTINUOUS    heparin (porcine) injection 5,000 Units  5,000 Units SubCUTAneous Q8H    ondansetron (ZOFRAN) injection 4 mg  4 mg IntraVENous Q4H PRN       Jeison Anderson MD

## 2017-07-06 LAB
ALBUMIN SERPL BCP-MCNC: 2.7 G/DL (ref 3.5–5)
ALBUMIN/GLOB SERPL: 0.9 {RATIO} (ref 1.1–2.2)
ALP SERPL-CCNC: 90 U/L (ref 45–117)
ALT SERPL-CCNC: 13 U/L (ref 12–78)
ANION GAP BLD CALC-SCNC: 12 MMOL/L (ref 5–15)
AST SERPL W P-5'-P-CCNC: 14 U/L (ref 15–37)
BASOPHILS # BLD AUTO: 0 K/UL (ref 0–0.1)
BASOPHILS # BLD: 0 % (ref 0–1)
BILIRUB SERPL-MCNC: 0.3 MG/DL (ref 0.2–1)
BUN SERPL-MCNC: 26 MG/DL (ref 6–20)
BUN/CREAT SERPL: 9 (ref 12–20)
CALCIUM SERPL-MCNC: 8.4 MG/DL (ref 8.5–10.1)
CHLORIDE SERPL-SCNC: 108 MMOL/L (ref 97–108)
CO2 SERPL-SCNC: 21 MMOL/L (ref 21–32)
CREAT SERPL-MCNC: 2.91 MG/DL (ref 0.55–1.02)
EOSINOPHIL # BLD: 0.3 K/UL (ref 0–0.4)
EOSINOPHIL NFR BLD: 4 % (ref 0–7)
ERYTHROCYTE [DISTWIDTH] IN BLOOD BY AUTOMATED COUNT: 15.5 % (ref 11.5–14.5)
GLOBULIN SER CALC-MCNC: 3.1 G/DL (ref 2–4)
GLUCOSE SERPL-MCNC: 73 MG/DL (ref 65–100)
HCT VFR BLD AUTO: 33.1 % (ref 35–47)
HGB BLD-MCNC: 11 G/DL (ref 11.5–16)
LYMPHOCYTES # BLD AUTO: 18 % (ref 12–49)
LYMPHOCYTES # BLD: 1.3 K/UL (ref 0.8–3.5)
MCH RBC QN AUTO: 28.4 PG (ref 26–34)
MCHC RBC AUTO-ENTMCNC: 33.2 G/DL (ref 30–36.5)
MCV RBC AUTO: 85.3 FL (ref 80–99)
MONOCYTES # BLD: 0.9 K/UL (ref 0–1)
MONOCYTES NFR BLD AUTO: 13 % (ref 5–13)
NEUTS SEG # BLD: 4.6 K/UL (ref 1.8–8)
NEUTS SEG NFR BLD AUTO: 65 % (ref 32–75)
PLATELET # BLD AUTO: 224 K/UL (ref 150–400)
POTASSIUM SERPL-SCNC: 3.9 MMOL/L (ref 3.5–5.1)
PROT SERPL-MCNC: 5.8 G/DL (ref 6.4–8.2)
RBC # BLD AUTO: 3.88 M/UL (ref 3.8–5.2)
SODIUM SERPL-SCNC: 141 MMOL/L (ref 136–145)
WBC # BLD AUTO: 7.1 K/UL (ref 3.6–11)

## 2017-07-06 PROCEDURE — 74011250636 HC RX REV CODE- 250/636: Performed by: INTERNAL MEDICINE

## 2017-07-06 PROCEDURE — 36415 COLL VENOUS BLD VENIPUNCTURE: CPT | Performed by: FAMILY MEDICINE

## 2017-07-06 PROCEDURE — 74011250637 HC RX REV CODE- 250/637: Performed by: INTERNAL MEDICINE

## 2017-07-06 PROCEDURE — 97530 THERAPEUTIC ACTIVITIES: CPT

## 2017-07-06 PROCEDURE — 74011250637 HC RX REV CODE- 250/637: Performed by: PSYCHIATRY & NEUROLOGY

## 2017-07-06 PROCEDURE — 80053 COMPREHEN METABOLIC PANEL: CPT | Performed by: FAMILY MEDICINE

## 2017-07-06 PROCEDURE — 65660000000 HC RM CCU STEPDOWN

## 2017-07-06 PROCEDURE — 74011000258 HC RX REV CODE- 258: Performed by: INTERNAL MEDICINE

## 2017-07-06 PROCEDURE — 85025 COMPLETE CBC W/AUTO DIFF WBC: CPT | Performed by: FAMILY MEDICINE

## 2017-07-06 PROCEDURE — 97116 GAIT TRAINING THERAPY: CPT

## 2017-07-06 RX ADMIN — LEVOTHYROXINE SODIUM 50 MCG: 50 TABLET ORAL at 06:39

## 2017-07-06 RX ADMIN — Medication 10 ML: at 05:21

## 2017-07-06 RX ADMIN — TRAZODONE HYDROCHLORIDE 25 MG: 50 TABLET ORAL at 21:20

## 2017-07-06 RX ADMIN — HEPARIN SODIUM 5000 UNITS: 5000 INJECTION, SOLUTION INTRAVENOUS; SUBCUTANEOUS at 21:21

## 2017-07-06 RX ADMIN — SODIUM CHLORIDE 75 ML/HR: 900 INJECTION, SOLUTION INTRAVENOUS at 13:46

## 2017-07-06 RX ADMIN — VENLAFAXINE HYDROCHLORIDE 225 MG: 150 CAPSULE, EXTENDED RELEASE ORAL at 11:11

## 2017-07-06 RX ADMIN — CEFTRIAXONE SODIUM 1 G: 1 INJECTION, POWDER, FOR SOLUTION INTRAMUSCULAR; INTRAVENOUS at 10:37

## 2017-07-06 RX ADMIN — BUPROPION HYDROCHLORIDE 150 MG: 150 TABLET, FILM COATED, EXTENDED RELEASE ORAL at 10:37

## 2017-07-06 RX ADMIN — HEPARIN SODIUM 5000 UNITS: 5000 INJECTION, SOLUTION INTRAVENOUS; SUBCUTANEOUS at 13:34

## 2017-07-06 RX ADMIN — HEPARIN SODIUM 5000 UNITS: 5000 INJECTION, SOLUTION INTRAVENOUS; SUBCUTANEOUS at 05:20

## 2017-07-06 NOTE — PROCEDURES
Charanjit Li [facility]   [facility], [address]   ROUTINE EEG REPORT       Name:  Rudy Blanco   MR#:  [id number]   :  1932   Account #:  [de-identified]    Date of Procedure:  2017   Date of Adm:  2017       REQUESTING PHYSICIAN: Zabrina. She is an 42-year-old lady with confusion to evaluate for abnormality. Medications are listed as Neurontin, Wellbutrin, Effexor, and Levothroid. This tracing is obtained while the patient is noted to be awake and   alert. During this state, the background consists of diffuse mixed   frequency theta range activities. Hyperventilation not performed due to   age and medical history. Intermittent photic stimulation, little alters the tracing. Sleep is not obtained. INTERPRETATION: This EEG recorded during the awake state is   abnormal secondary to diffuse slowing of the  background rhythms   indicative of a moderate degree of bihemispheric dysfunction as is   commonly seen with an encephalopathy. This may have contributed to   some toxic colon, metabolic, diffuse structural, and/or pharmacologic   effects. Clinical correlation is recommended. This pattern is also   commonly seen in chronic neurodegenerative disorders such as   dementia.         MD ZHANG Aviles   D:  2017   14:13   T:  2017   08:48   Job #:  [id number]

## 2017-07-06 NOTE — PROGRESS NOTES
Daily Progress Note: 7/6/2017  Maria Fernanda Piper-Umlang  Mian Mckeon MD    Assessment/Plan:   NIMCO: likely some degree of chronic: unclear etiology.  Has mild IVVD  --Cr improving slowly  --normal renal US  --nephrology followed     UTI -- culture shows gram neg rods - ESCHERICHIA FERGUSONII  --received rocephin inpt - allergic to Pen and Sulfa according to allergy list    Back pain - superior endplate depression, mild L3  --pain control      HTN: watch    Depression/Paranoia/agitation/Dementia with behavioral disturbance likely worsened by UTI  --Psych has seen, recommend neuro consult and meds have been adjusted  --haldol prn agitation  --improved with tx of UTI    Hypothyroidism  -- cont supplement     History of PE: not on anticoagulation  -- DVT ppx on heparin SQ      Weakness / Fall: lives with grand-daughter   -- PT / Betzy Peper, fall precautions         Problem List:  Problem List as of 7/6/2017  Date Reviewed: 7/2/2017          Codes Class Noted - Resolved    UTI (urinary tract infection) ICD-10-CM: N39.0  ICD-9-CM: 599.0  7/2/2017 - Present        Acute respiratory failure ([facility] ICD-10-CM: J96.00  ICD-9-CM: 518.81  12/21/2014 - Present        PE (pulmonary embolism) ICD-10-CM: I26.99  ICD-9-CM: 415.19  12/11/2014 - Present        NIMCO (acute kidney injury) ([facility] ICD-10-CM: N17.9  ICD-9-CM: 584.9  12/11/2014 - Present        Renal lesion ICD-10-CM: N28.9  ICD-9-CM: 593.9  11/5/2014 - Present        Autoimmune hemolytic anemia ([facility] ICD-10-CM: D59.1  ICD-9-CM: 283.0  11/5/2014 - Present        Leukocytosis ICD-10-CM: Z52.733  ICD-9-CM: 288.60  10/25/2014 - Present        HTN (hypertension) ICD-10-CM: I10  ICD-9-CM: 401.9  10/19/2014 - Present        Depression ICD-10-CM: F32.9  ICD-9-CM: 296  10/19/2014 - Present        Hypothyroidism ICD-10-CM: E03.9  ICD-9-CM: 244.9  10/19/2014 - Present        Diarrhea ICD-10-CM: R19.7  ICD-9-CM: 787.91  10/19/2014 - Present        Leukopenia ICD-10-CM: D72.819  ICD-9-CM: 288.50  10/19/2014 - Present        Anemia ICD-10-CM: D64.9  ICD-9-CM: 285.9  10/19/2014 - Present        Transaminitis ICD-10-CM: R74.0  ICD-9-CM: 790.4  10/19/2014 - Present        Hyperbilirubinemia ICD-10-CM: E80.6  ICD-9-CM: 782.4  10/19/2014 - Present        Weakness ICD-10-CM: R53.1  ICD-9-CM: 780.79  10/18/2014 - Present              Subjective:   H&P: Ms. Rosa Fernandez is a 80 y.o. female w/ hx of HTN, hypothyroidism, depression, prior PE who presents with low back pain after fall. Per family at bedside, pt has been very weak in recent weeks, intermittent, moderate, worsening, associated with falling at home. Yesterday, pt was walking to bathroom and fell to the floor on her back. Now with severe back pain. No weakness or numbness. No fevers/chills, cough, CP, SOB. Reports nausea. ED workup showed severe NIMCO, leukocytosis. 7/2:  Patient c/o at least a week of nausea, not eating much. No abd. Pain, bowels have been OK. She denies dysuria. She has had chronic back pain, and now worse after her fall. This AM she still is with back pain but improved, she just ate some breakfast and feels like it will probably stay down. 7/3:  Nausea is some better. She wants to see ortho for her back pain. Creat at 3 yesterday. AM labs pending. Nurses report she is not letting them draw labs or start IV. Sees Neuro outpt for dementia. 7/4: Haldol last night around 10pm for agitation/paranoia -- she has been sleeping since. Didn't have her dinner last night. When I wake her up and introduce myself this morning she seems to recognize me, she does not remember her paranoid activities. She know she is in the hospital and admits to back pain. 7/5: Rested comfortably overnight. She has no complaints. She speaks rationally and not delusional at this time. She knows she is in the hospital but not which one.   It turns out she has not had dementia work up in past.  Altered mental status likely due to UTI and underlying dementia. Culture growing [address] [employer] ([place] which is sensitive to Rocephin. Creat about the same and will need monitoring. Hopefully home tomorrow with outpt work up of dementia but may start namenda at [place] if Neuro agrees. :  Cont to improve. No complaints. No further delusional episodes. Memory cont to be poor at times but better at other times - this AM she knows she is in [facility]. Creat slowly improving but may have baseline CKD. For now, per neuro, hold dementia meds until renal function improves. Discussed with DE[address] daughter and now they want her go to rehab as she was not functioning well in her \"in-law suite. \"         Review of Systems:   Review of systems not obtained due to patient factors. Allergies   Allergen Reactions    Percocet [Oxycodone-Acetaminophen] Anxiety     Anxiety, shaking     Pcn [Penicillins] Rash     Patient states this happened ~ 40 years ago  Tolerates ceftriaxone    Sulfa (Sulfonamide Antibiotics) Itching and Vertigo       Objective:   Physical Exam:     Visit Vitals    /69 (BP 1 Location: Left arm, BP Patient Position: At rest)    Pulse 71    Temp 98.3 °F (36.8 °C)    Resp 18    Ht 5' 3\" (1.6 m)    Wt 70.3 kg (155 lb)    SpO2 95%    Breastfeeding No    BMI 27.46 kg/m2      O2 Device: Room air    Temp (24hrs), Av.9 °F (36.6 °C), Min:97 °F (36.1 °C), Max:98.3 °F (36.8 °C)             General:  Alert, cooperative, no distress, appears stated age. Head:  Normocephalic, without obvious abnormality, atraumatic. Eyes:  Conjunctivae/corneas clear. PERRL, EOMs intact. Neck: Supple, symmetrical, trachea midline   Lungs:   Clear to auscultation bilaterally. Heart:  Regular rate and rhythm, S1, S2 normal, no murmur, click, rub or gallop. Abdomen:   Soft, non-tender. Bowel sounds normal. No masses,  No organomegaly. Extremities: Extremities normal, atraumatic, no cyanosis or edema. No calf tenderness or cords.      Pulses: 2+ and symmetric all extremities. Skin: Skin color, texture, turgor normal. No rashes or lesions   Neurologic: CNII-XII intact. Alert and oriented X 2 at this moment [person,palce] - waxes and wanes but improved. Data Review:       Recent Days:  Recent Labs      07/06/17   0524 07/05/17   0402  07/04/17   0335   WBC  7.1  6.9  7.4   HGB  11.0*  10.8*  11.2*   HCT  33.1*  33.3*  33.8*   PLT  224  222  220     Recent Labs      07/06/17   0524  07/05/17   0402  07/04/17   0335  07/03/17   1035   NA  141  141  141  139   K  3.9  4.0  4.3  4.3   CL  108  108  107  106   CO2  21  23  19*  23   GLU  73  85  89  86   BUN  26*  30*  34*  39*   CREA  2.91*  3.32*  3.44*  3.78*   CA  8.4*  8.2*  8.5  8.3*   MG   --    --   1.7  1.5*   ALB  2.7*  2.8*  2.8*  3.2*   SGOT  14*  16  12*  20   ALT  13  15  14  18     No results for input(s): PH, PCO2, PO2, HCO3, FIO2 in the last 72 hours. 24 Hour Results:  Recent Results (from the past 24 hour(s))   METABOLIC PANEL, COMPREHENSIVE    Collection Time: 07/06/17  5:24 AM   Result Value Ref Range    Sodium 141 136 - 145 mmol/L    Potassium 3.9 3.5 - 5.1 mmol/L    Chloride 108 97 - 108 mmol/L    CO2 21 21 - 32 mmol/L    Anion gap 12 5 - 15 mmol/L    Glucose 73 65 - 100 mg/dL    BUN 26 (H) 6 - 20 MG/DL    Creatinine 2.91 (H) 0.55 - 1.02 MG/DL    BUN/Creatinine ratio 9 (L) 12 - 20      GFR est AA 19 (L) >60 ml/min/1.73m2    GFR est non-AA 15 (L) >60 ml/min/1.73m2    Calcium 8.4 (L) 8.5 - 10.1 MG/DL    Bilirubin, total 0.3 0.2 - 1.0 MG/DL    ALT (SGPT) 13 12 - 78 U/L    AST (SGOT) 14 (L) 15 - 37 U/L    Alk.  phosphatase 90 45 - 117 U/L    Protein, total 5.8 (L) 6.4 - 8.2 g/dL    Albumin 2.7 (L) 3.5 - 5.0 g/dL    Globulin 3.1 2.0 - 4.0 g/dL    A-G Ratio 0.9 (L) 1.1 - 2.2     CBC WITH AUTOMATED DIFF    Collection Time: 07/06/17  5:24 AM   Result Value Ref Range    WBC 7.1 3.6 - 11.0 K/uL    RBC 3.88 3.80 - 5.20 M/uL    HGB 11.0 (L) 11.5 - 16.0 g/dL    HCT 33.1 (L) 35.0 - 47.0 %    MCV 85.3 80.0 - 99.0 FL    MCH 28.4 26.0 - 34.0 PG    MCHC 33.2 30.0 - 36.5 g/dL    RDW 15.5 (H) 11.5 - 14.5 %    PLATELET 906 923 - 128 K/uL    NEUTROPHILS 65 32 - 75 %    LYMPHOCYTES 18 12 - 49 %    MONOCYTES 13 5 - 13 %    EOSINOPHILS 4 0 - 7 %    BASOPHILS 0 0 - 1 %    ABS. NEUTROPHILS 4.6 1.8 - 8.0 K/UL    ABS. LYMPHOCYTES 1.3 0.8 - 3.5 K/UL    ABS. MONOCYTES 0.9 0.0 - 1.0 K/UL    ABS. EOSINOPHILS 0.3 0.0 - 0.4 K/UL    ABS.  BASOPHILS 0.0 0.0 - 0.1 K/UL       Medications reviewed  Current Facility-Administered Medications   Medication Dose Route Frequency    buPROPion XL (WELLBUTRIN XL) tablet 150 mg  150 mg Oral DAILY    traZODone (DESYREL) tablet 25 mg  25 mg Oral QHS    haloperidol lactate (HALDOL) injection 1 mg  1 mg IntraVENous Q6H PRN    haloperidol lactate (HALDOL) injection 1 mg  1 mg IntraMUSCular Q6H PRN    sodium chloride (NS) flush 5-10 mL  5-10 mL IntraVENous Q8H    sodium chloride (NS) flush 5-10 mL  5-10 mL IntraVENous PRN    naloxone (NARCAN) injection 0.4 mg  0.4 mg IntraVENous PRN    levothyroxine (SYNTHROID) tablet 50 mcg  50 mcg Oral ACB    venlafaxine-SR (EFFEXOR-XR) capsule 225 mg  225 mg Oral DAILY    cefTRIAXone (ROCEPHIN) 1 g in 0.9% sodium chloride (MBP/ADV) 50 mL  1 g IntraVENous Q24H    traMADol (ULTRAM) tablet 50 mg  50 mg Oral Q6H PRN    0.9% sodium chloride infusion  75 mL/hr IntraVENous CONTINUOUS    heparin (porcine) injection 5,000 Units  5,000 Units SubCUTAneous Q8H    ondansetron (ZOFRAN) injection 4 mg  4 mg IntraVENous Q4H PRN       Basia Heredia MD

## 2017-07-06 NOTE — PROGRESS NOTES
Problem: Patient Education: Go to Patient Education Activity  Goal: Patient/Family Education  PHYSICAL THERAPY TREATMENT  Patient: Clive Piper-Kaylan [de-identified]80 y.o. female)  Date: 7/6/2017  Diagnosis: NIMCO (acute kidney injury) ([facility] <principal problem not specified>       Precautions: DNR, Fall  Chart, physical therapy assessment, plan of care and goals were reviewed. ASSESSMENT:  Pt comes to sit with CGA to min assist.Pt CGA to stand. Pt donned back brace with mod assist.Pt ambulated 40ft with RW min assist.Pt left sitting in chair with family present. Pt progressing slowly. Continue goals. Progression toward goals:  [ ]      Improving appropriately and progressing toward goals  [X]      Improving slowly and progressing toward goals  [ ]      Not making progress toward goals and plan of care will be adjusted       PLAN:  Patient continues to benefit from skilled intervention to address the above impairments. Continue treatment per established plan of care.   Discharge Recommendations:  Cristo Mcfarland  Further Equipment Recommendations for Discharge:  rolling walker       SUBJECTIVE:          OBJECTIVE DATA SUMMARY:   Critical Behavior:  Neurologic State: Alert, Appropriate for age  Orientation Level: Oriented X4  Cognition: Follows commands  Safety/Judgement: Decreased insight into deficits, Decreased awareness of need for safety  Functional Mobility Training:  Bed Mobility:     Supine to Sit: Contact guard assistance;Minimum assistance                          Transfers:  Sit to Stand: Contact guard assistance;Minimum assistance  Stand to Sit: Contact guard assistance                             Balance:  Sitting: Intact  Standing: With support  Ambulation/Gait Training:  Distance (ft): 40 Feet (ft)  Assistive Device: Gait belt;Walker, rolling  Ambulation - Level of Assistance: Minimal assistance                 Base of Support: Narrowed     Speed/Hoa: Pace decreased (<100 feet/min)  Step Length: Left shortened;Right shortened                 Pain:  Pain Scale 1: Numeric (0 - 10)  Pain Intensity 1: 0              Activity Tolerance:   Pt tolerated treatment well. Please refer to the flowsheet for vital signs taken during this treatment.   After treatment:   [X] Patient left in no apparent distress sitting up in chair  [ ] Patient left in no apparent distress in bed  [ ] Call bell left within reach  [ ] Nursing notified  [ ] Caregiver present  [ ] Bed alarm activated      COMMUNICATION/COLLABORATION:   The patients plan of care was discussed with: Physical Therapist     Armando Gardner PTA   Time Calculation: 23 mins

## 2017-07-06 NOTE — PROGRESS NOTES
Bedside and Verbal shift change report given to [address] (oncoming nurse) by Jabari Schulz RN (offgoing nurse). Report included the following information SBAR, Kardex, Intake/Output, MAR, Accordion, Recent Results and Med Rec Status.

## 2017-07-06 NOTE — PROGRESS NOTES
Nutrition Assessment:    RECOMMENDATIONS/INTERVENTION(S):   Continue Regular, liberalized diet to promote PO    Start snacks BID per pt preferences    Start Ensure Pudding once daily (addendum)    Obtain new wt    Monitor PO, renal labs, wt   ASSESSMENT:   7/6: Follow-up. Nephrology following - no known etiology for NIMCO. Neurology following. Labs/meds reviewed. BUN, Cr remain elevated but trending down. NS IVF. No edema noted. Noted fair PO of meals per nursing x 2 days. Last BM 7/4. Visited pt this afternoon. Feeling better. Confirms eating ~50% of meals. Dislikes Ensure Enlive. Agreeable to alternate snacks - RD to honor preferences & start snacks. Wt stable? Obtain new wt.    7/3: Admitted after fall, back pain. PMHx: HTN, hypothyroid, NIMCO, UTI, PE. Pt on regular diet, pt eating < 25% of meals. Breakfast tray observed, lunch tray hadn't been touched yet at 2:30pm. Pt agreeable to Ensure BID until appetite improves. Pt in pain and slightly confused during visit. Pt says she's able to call and order meals but hadn't done so yet. Instructed pt how to order meals to encourage PO intakes. Documented as 50% on 7/2. Pt states she's lost around 10 lbs over the last 2 months 2/2 poor PO intakes at home. Pt in visible pain after mild cough. Pt states she was nauseous earlier but not currently. Labs/meds reviewed. Diet Order: Regular, Ensure Enlive BID  % Eaten:  No data found.     Pertinent Medications: [x] Reviewed    Chemistries:  Lab Results   Component Value Date/Time    Sodium 141 07/06/2017 05:24 AM    Potassium 3.9 07/06/2017 05:24 AM    Chloride 108 07/06/2017 05:24 AM    CO2 21 07/06/2017 05:24 AM    Anion gap 12 07/06/2017 05:24 AM    Glucose 73 07/06/2017 05:24 AM    BUN 26 07/06/2017 05:24 AM    Creatinine 2.91 07/06/2017 05:24 AM    BUN/Creatinine ratio 9 07/06/2017 05:24 AM    GFR est AA 19 07/06/2017 05:24 AM    GFR est non-AA 15 07/06/2017 05:24 AM    Calcium 8.4 07/06/2017 05:24 AM AST (SGOT) 14 07/06/2017 05:24 AM    Alk. phosphatase 90 07/06/2017 05:24 AM    Protein, total 5.8 07/06/2017 05:24 AM    Albumin 2.7 07/06/2017 05:24 AM    Globulin 3.1 07/06/2017 05:24 AM    A-G Ratio 0.9 07/06/2017 05:24 AM    ALT (SGPT) 13 07/06/2017 05:24 AM      Anthropometrics: Height: 5' 3\" (160 cm) Weight: 70.3 kg (155 lb)    IBW (%IBW):   ( ) UBW (%UBW):   (  %)    BMI: Body mass index is 27.46 kg/(m^2). This BMI is indicative of:   [] Underweight    [x] Normal - per age   [] Overweight    []  Obesity    []  Extreme Obesity (BMI>40)  Estimated Nutrition Needs (Based on): 1451 Kcals/day (MSJ (1122x1.3)) , 56 g (-70g/day (0.8-1.0g/kg)) Protein  Carbohydrate:  At Least 130 g/day  Fluids: 1450 mL/day    Last BM: 7/4   [x]Active     []Hyperactive  []Hypoactive       [] Absent   BS  Skin:    [] Intact   [] Incision  [] Breakdown   [] DTI   [] Tears/Excoriation/Abrasion  []Edema [x] Other thin     Wt Readings from Last 30 Encounters:   07/02/17 70.3 kg (155 lb)   09/16/16 72.6 kg (160 lb)   04/04/16 72.6 kg (160 lb)   04/08/15 77.2 kg (170 lb 3.2 oz)   02/09/15 75.3 kg (166 lb)   12/24/14 91.4 kg (201 lb 8 oz)   12/11/14 77.1 kg (170 lb)   12/03/14 76.9 kg (169 lb 9.6 oz)   11/24/14 77.1 kg (170 lb)   11/05/14 74 kg (163 lb 3.2 oz)   10/18/14 76.2 kg (168 lb)      NUTRITION DIAGNOSES:   Problem:  Inadequate energy intake     Etiology: related to decreased ability to consume sufficient energy     Signs/Symptoms: as evidenced by poor PO intakes, observed intakes <25%  no consumption of ONS    NUTRITION INTERVENTIONS:  Meals/Snacks: General/healthful diet   Supplements: Commercial supplement              GOAL:   Continue to consume/tolerate >/=50% of meals + snacks in the next 3-5 days    Cultural, Worship, or Ethnic Dietary Needs: None     LEARNING NEEDS (Diet, Food/Nutrient-Drug Interaction):    [x] None Identified   [] Identified and Education Provided/Documented   [] Identified and Pt declined/was not appropriate      [x] Interdisciplinary Care Plan Reviewed/Documented    [x] Discharge Needs:    TBD   [] No Nutrition Related Discharge Needs    NUTRITION RISK:   Pt Is At Nutrition Risk  [x]     No Nutrition Risk Identified  []       PT SEEN FOR:    []  MD Consult: []Calorie Count      []Diabetic Diet Education        []Diet Education     []Electrolyte Management     []General Nutrition Management and Supplements     []Management of Tube Feeding     []TPN Recommendations    []  RN Referral:  []MST score >=2     []Enteral/Parenteral Nutrition PTA     []Pregnant: Gestational DM or Multigestation                 [] Pressure Ulcer    []  Low BMI      []  Length of Stay       [] Dysphagia Diet         [x] Follow-up            Leva Core, [address] [place] [address]  Pager [phone]

## 2017-07-06 NOTE — PROGRESS NOTES
[facility]  YOB: 1932          Assessment & Plan:   1. NIMCO  - UA: Mild protein,   - US: no hydro  - Cr better with Volume  - No RRT needed  - Etiology unclear     2. Leukocytosis  - on CTX  3. HTN  - Not on meds       Subjective:   CC:arf  HPI: Patient seen   She is eager to go home  NIMCO is better, no nvd. No cp/sob       Current Facility-Administered Medications   Medication Dose Route Frequency    buPROPion XL (WELLBUTRIN XL) tablet 150 mg  150 mg Oral DAILY    traZODone (DESYREL) tablet 25 mg  25 mg Oral QHS    haloperidol lactate (HALDOL) injection 1 mg  1 mg IntraVENous Q6H PRN    haloperidol lactate (HALDOL) injection 1 mg  1 mg IntraMUSCular Q6H PRN    sodium chloride (NS) flush 5-10 mL  5-10 mL IntraVENous Q8H    sodium chloride (NS) flush 5-10 mL  5-10 mL IntraVENous PRN    naloxone (NARCAN) injection 0.4 mg  0.4 mg IntraVENous PRN    levothyroxine (SYNTHROID) tablet 50 mcg  50 mcg Oral ACB    venlafaxine-SR (EFFEXOR-XR) capsule 225 mg  225 mg Oral DAILY    cefTRIAXone (ROCEPHIN) 1 g in 0.9% sodium chloride (MBP/ADV) 50 mL  1 g IntraVENous Q24H    traMADol (ULTRAM) tablet 50 mg  50 mg Oral Q6H PRN    0.9% sodium chloride infusion  75 mL/hr IntraVENous CONTINUOUS    heparin (porcine) injection 5,000 Units  5,000 Units SubCUTAneous Q8H    ondansetron (ZOFRAN) injection 4 mg  4 mg IntraVENous Q4H PRN          Objective:     Vitals:  Blood pressure 166/78, pulse 83, temperature 97.7 °F (36.5 °C), resp. rate 18, height 5' 3\" (1.6 m), weight 70.3 kg (155 lb), SpO2 97 %, not currently breastfeeding.   Temp (24hrs), Av.1 °F (36.7 °C), Min:97.7 °F (36.5 °C), Max:98.3 °F (36.8 °C)      Intake and Output:          Physical Exam:                Patient is intubated:  no    Physical Examination:   GENERAL ASSESSMENT: NAD  HEENT:Nontraumatic   CHEST: CTA  HEART: S1S2  ABDOMEN: Soft,NT,  :Rivera: n  EXTREMITY: EDEMA  NEURO:Grossly non focal          ECG/rhythm:    Data Review      No results for input(s): TNIPOC in the last 72 hours. No lab exists for component: ITNL   No results for input(s): CPK, CKMB, TROIQ in the last 72 hours. Recent Labs      07/06/17   0524  07/05/17   0402  07/04/17   0335   NA  141  141  141   K  3.9  4.0  4.3   CL  108  108  107   CO2  21  23  19*   BUN  26*  30*  34*   CREA  2.91*  3.32*  3.44*   GLU  73  85  89   MG   --    --   1.7   CA  8.4*  8.2*  8.5   ALB  2.7*  2.8*  2.8*   WBC  7.1  6.9  7.4   HGB  11.0*  10.8*  11.2*   HCT  33.1*  33.3*  33.8*   PLT  224  222  220      No results for input(s): INR, PTP, APTT in the last 72 hours. No lab exists for component: INREXT, INREXT  Needs: urine analysis, urine sodium, protein and creatinine  Lab Results   Component Value Date/Time    Sodium urine, random 55 07/02/2017 02:41 AM    Creatinine, urine 187.78 12/21/2014 03:50 PM         Discussed with:  Family, Colleague    : Carissa Antony MD  7/6/2017        [facility]:  [email or web address]  [place] office:  [address] [address]  [place], [id number] [place] [address]  Phone: [phone]  Fax :     [phone]    [place] office:  [address] [facility], [address] [place] [address]  Phone - [phone]  Fax - [phone]

## 2017-07-06 NOTE — PROGRESS NOTES
Bedside and Verbal shift change report given to Erin Mendez (oncoming nurse) by Bernardino Merritt (offgoing nurse). Report included the following information SBAR, Kardex, MAR and Recent Results.

## 2017-07-06 NOTE — PROGRESS NOTES
7/6/2017   CARE MANAGEMENT NOTE:  (cross coverage)  CM met with pt and family to discuss disposition I.e SNF for short term rehab. CM faxed referrals to 1. [facility];  2. Marie;  [facility] per family choice. Maria Fernanda and Victor M Sam (son) are family contacts at [phone].   Richard

## 2017-07-07 LAB
ALBUMIN SERPL BCP-MCNC: 2.8 G/DL (ref 3.5–5)
ALBUMIN/GLOB SERPL: 0.9 {RATIO} (ref 1.1–2.2)
ALP SERPL-CCNC: 89 U/L (ref 45–117)
ALT SERPL-CCNC: 17 U/L (ref 12–78)
ANION GAP BLD CALC-SCNC: 12 MMOL/L (ref 5–15)
AST SERPL W P-5'-P-CCNC: 15 U/L (ref 15–37)
BASOPHILS # BLD AUTO: 0 K/UL (ref 0–0.1)
BASOPHILS # BLD: 0 % (ref 0–1)
BILIRUB SERPL-MCNC: 0.3 MG/DL (ref 0.2–1)
BUN SERPL-MCNC: 24 MG/DL (ref 6–20)
BUN/CREAT SERPL: 9 (ref 12–20)
CALCIUM SERPL-MCNC: 7.9 MG/DL (ref 8.5–10.1)
CHLORIDE SERPL-SCNC: 108 MMOL/L (ref 97–108)
CO2 SERPL-SCNC: 21 MMOL/L (ref 21–32)
CREAT SERPL-MCNC: 2.67 MG/DL (ref 0.55–1.02)
EOSINOPHIL # BLD: 0.3 K/UL (ref 0–0.4)
EOSINOPHIL NFR BLD: 4 % (ref 0–7)
ERYTHROCYTE [DISTWIDTH] IN BLOOD BY AUTOMATED COUNT: 15.6 % (ref 11.5–14.5)
GLOBULIN SER CALC-MCNC: 3 G/DL (ref 2–4)
GLUCOSE SERPL-MCNC: 79 MG/DL (ref 65–100)
HCT VFR BLD AUTO: 32 % (ref 35–47)
HGB BLD-MCNC: 10.5 G/DL (ref 11.5–16)
LYMPHOCYTES # BLD AUTO: 19 % (ref 12–49)
LYMPHOCYTES # BLD: 1.6 K/UL (ref 0.8–3.5)
MCH RBC QN AUTO: 28.3 PG (ref 26–34)
MCHC RBC AUTO-ENTMCNC: 32.8 G/DL (ref 30–36.5)
MCV RBC AUTO: 86.3 FL (ref 80–99)
MONOCYTES # BLD: 1 K/UL (ref 0–1)
MONOCYTES NFR BLD AUTO: 12 % (ref 5–13)
NEUTS SEG # BLD: 5.3 K/UL (ref 1.8–8)
NEUTS SEG NFR BLD AUTO: 65 % (ref 32–75)
PLATELET # BLD AUTO: 206 K/UL (ref 150–400)
POTASSIUM SERPL-SCNC: 3.6 MMOL/L (ref 3.5–5.1)
PROT SERPL-MCNC: 5.8 G/DL (ref 6.4–8.2)
RBC # BLD AUTO: 3.71 M/UL (ref 3.8–5.2)
SODIUM SERPL-SCNC: 141 MMOL/L (ref 136–145)
WBC # BLD AUTO: 8.2 K/UL (ref 3.6–11)

## 2017-07-07 PROCEDURE — 65660000000 HC RM CCU STEPDOWN

## 2017-07-07 PROCEDURE — 74011250637 HC RX REV CODE- 250/637: Performed by: FAMILY MEDICINE

## 2017-07-07 PROCEDURE — 97530 THERAPEUTIC ACTIVITIES: CPT

## 2017-07-07 PROCEDURE — 80053 COMPREHEN METABOLIC PANEL: CPT | Performed by: FAMILY MEDICINE

## 2017-07-07 PROCEDURE — 74011250637 HC RX REV CODE- 250/637: Performed by: PSYCHIATRY & NEUROLOGY

## 2017-07-07 PROCEDURE — 36415 COLL VENOUS BLD VENIPUNCTURE: CPT | Performed by: FAMILY MEDICINE

## 2017-07-07 PROCEDURE — 74011250637 HC RX REV CODE- 250/637: Performed by: INTERNAL MEDICINE

## 2017-07-07 PROCEDURE — 85025 COMPLETE CBC W/AUTO DIFF WBC: CPT | Performed by: FAMILY MEDICINE

## 2017-07-07 PROCEDURE — 74011250636 HC RX REV CODE- 250/636: Performed by: INTERNAL MEDICINE

## 2017-07-07 PROCEDURE — 97116 GAIT TRAINING THERAPY: CPT

## 2017-07-07 RX ORDER — CEFDINIR 300 MG/1
300 CAPSULE ORAL DAILY
Status: DISCONTINUED | OUTPATIENT
Start: 2017-07-07 | End: 2017-07-10

## 2017-07-07 RX ORDER — CEFDINIR 300 MG/1
300 CAPSULE ORAL EVERY 12 HOURS
Status: DISCONTINUED | OUTPATIENT
Start: 2017-07-07 | End: 2017-07-07 | Stop reason: DRUGHIGH

## 2017-07-07 RX ADMIN — VENLAFAXINE HYDROCHLORIDE 225 MG: 150 CAPSULE, EXTENDED RELEASE ORAL at 09:14

## 2017-07-07 RX ADMIN — TRAZODONE HYDROCHLORIDE 25 MG: 50 TABLET ORAL at 22:10

## 2017-07-07 RX ADMIN — HEPARIN SODIUM 5000 UNITS: 5000 INJECTION, SOLUTION INTRAVENOUS; SUBCUTANEOUS at 22:10

## 2017-07-07 RX ADMIN — HEPARIN SODIUM 5000 UNITS: 5000 INJECTION, SOLUTION INTRAVENOUS; SUBCUTANEOUS at 13:32

## 2017-07-07 RX ADMIN — BUPROPION HYDROCHLORIDE 150 MG: 150 TABLET, FILM COATED, EXTENDED RELEASE ORAL at 09:14

## 2017-07-07 RX ADMIN — LEVOTHYROXINE SODIUM 50 MCG: 50 TABLET ORAL at 06:05

## 2017-07-07 RX ADMIN — CEFDINIR 300 MG: 300 CAPSULE ORAL at 09:14

## 2017-07-07 RX ADMIN — Medication 10 ML: at 22:11

## 2017-07-07 RX ADMIN — SODIUM CHLORIDE 75 ML/HR: 900 INJECTION, SOLUTION INTRAVENOUS at 02:41

## 2017-07-07 RX ADMIN — HEPARIN SODIUM 5000 UNITS: 5000 INJECTION, SOLUTION INTRAVENOUS; SUBCUTANEOUS at 04:41

## 2017-07-07 NOTE — PROGRESS NOTES
Bedside and Verbal shift change report given to Levi Zuleta RN (oncoming nurse) by Srini Lal RN (offgoing nurse). Report included the following information SBAR, Kardex and MAR.

## 2017-07-07 NOTE — PROGRESS NOTES
Bedside and Verbal shift change report given to Kong Davila, [facility] (oncoming nurse) by Carrillo Cannon RN (offgoing nurse). Report included the following information Ami OLMSTEAD and MAR.

## 2017-07-07 NOTE — PROGRESS NOTES
7/7/2017  CARE MANAGEMENT NOTE:  CM reviewed EMR for clinical updates and noted that pt was given Haldol last evening for agitation and paranoia. SNF is the goal for short term rehab however pt's agitation will need to be managed before she is accepted to SNF. CM sent out referrals to three facilities of choice but no acceptance yet.   Richard

## 2017-07-07 NOTE — PROGRESS NOTES
Problem: Falls - Risk of  Goal: *Absence of falls  Outcome: Progressing Towards Goal  Education reinforced on importance of using call bell when needing to go to [facility]. Bed alarm set on patient. Problem: Pressure Injury - Risk of  Goal: *Prevention of pressure ulcer  Patient able to turn self. Lift sheet used if patient needs to be moved in bed.

## 2017-07-07 NOTE — PROGRESS NOTES
Problem: Mobility Impaired (Adult and Pediatric)  Goal: *Acute Goals and Plan of Care (Insert Text)  Physical Therapy Goals  Initiated 7/2/2017  1. Patient will move from supine to sit and sit to supine in bed with moderate assistance within 7 day(s). 2. Patient will transfer from bed to chair and chair to bed with moderate assistance using the least restrictive device within 7 day(s). 3. Patient will perform sit to stand with moderate assistance within 7 day(s). 4. Patient will ambulate with moderate assistance for 50 feet with the least restrictive device within 7 day(s). 5. Patient will ascend/descend 3 stairs with double handrail(s) with moderate assistance within 7 day(s). PHYSICAL THERAPY TREATMENT  Patient: Greer Piper-Umnita [de-identified]80 y.o. female)  Date: 7/7/2017  Diagnosis: NIMCO (acute kidney injury) ([facility] <principal problem not specified>       Precautions: DNR, Fall  Chart, physical therapy assessment, plan of care and goals were reviewed. ASSESSMENT:  Pt comes to sit with CGA. Pt to stand with CGA. Pts back brace has been modified and is no longer fitting. Pt ambulated 40ft in room without RW CGA. Without brace. Pt has a leg length discrepancy causing increased lateral sway. Pt CGA back  to bed. Pt progressing slowly. Continue goals. .  Progression toward goals:  [ ]      Improving appropriately and progressing toward goals  [ ]      Improving slowly and progressing toward goals  [ ]      Not making progress toward goals and plan of care will be adjusted       PLAN:  Patient continues to benefit from skilled intervention to address the above impairments. Continue treatment per established plan of care.   Discharge Recommendations:  Cristo Mcfarland  Further Equipment Recommendations for Discharge:  rolling walker       SUBJECTIVE:          OBJECTIVE DATA SUMMARY:   Critical Behavior:  Neurologic State: Alert  Orientation Level: Oriented X4  Cognition: Decreased attention/concentration  Safety/Judgement: Decreased insight into deficits, Decreased awareness of need for safety  Functional Mobility Training:  Bed Mobility:     Supine to Sit: Contact guard assistance  Sit to Supine: Contact guard assistance                       Transfers:  Sit to Stand: Contact guard assistance  Stand to Sit: Contact guard assistance                             Balance:  Sitting: Intact  Standing: Intact; With support  Ambulation/Gait Training:  Distance (ft): 40 Feet (ft)  Assistive Device: Gait belt;Walker, rolling  Ambulation - Level of Assistance: Contact guard assistance        Gait Abnormalities: Decreased step clearance; Path deviations        Base of Support: Narrowed     Speed/Hoa: Pace decreased (<100 feet/min)  Step Length: Right shortened;Left shortened                               Pain:  Pain Scale 1: Visual  Pain Intensity 1: 0           Pain Intervention(s) 1: Repositioned  Activity Tolerance:   Pt tolerated treatment well. Please refer to the flowsheet for vital signs taken during this treatment.   After treatment:   [ ] Patient left in no apparent distress sitting up in chair  [X] Patient left in no apparent distress in bed  [ ] Call bell left within reach  [ ] Nursing notified  [ ] Caregiver present  [ ] Bed alarm activated      COMMUNICATION/COLLABORATION:   The patients plan of care was discussed with: Physical Therapist     Warden Astrid PTA   Time Calculation: 24 mins

## 2017-07-07 NOTE — PROGRESS NOTES
Daily Progress Note: 7/7/2017  Maria Fernanda Piper-Kaylan Lockwood MD    Assessment/Plan:   NIMCO: likely with some degree of chronic: unclear etiology.  Had mild IVVD  --Cr improving slowly  --normal renal US  --nephrology followed     UTI -- culture shows gram neg rods - ESCHERICHIA FERGUSONII  --received rocephin inpt x 5 days - allergic to Pen and Sulfa according to allergy list - discussed with Pharm and will switch to po Omnicef     Back pain - superior endplate depression, mild L3  --pain control      HTN: watch    Depression/Paranoia/agitation/Dementia with behavioral disturbance likely worsened by UTI  --Psych has seen, recommend neuro consult and meds have been adjusted  --haldol prn agitation  --improved with tx of UTI    Hypothyroidism  -- cont supplement     History of PE: not on anticoagulation  -- DVT ppx on heparin SQ      Weakness / Fall: lives with grand-daughter   -- PT / Gradie Blair, fall precautions         Problem List:  Problem List as of 7/7/2017  Date Reviewed: 7/2/2017          Codes Class Noted - Resolved    UTI (urinary tract infection) ICD-10-CM: N39.0  ICD-9-CM: 599.0  7/2/2017 - Present        Acute respiratory failure ([facility] ICD-10-CM: J96.00  ICD-9-CM: 518.81  12/21/2014 - Present        PE (pulmonary embolism) ICD-10-CM: I26.99  ICD-9-CM: 415.19  12/11/2014 - Present        NIMCO (acute kidney injury) ([facility] ICD-10-CM: N17.9  ICD-9-CM: 584.9  12/11/2014 - Present        Renal lesion ICD-10-CM: N28.9  ICD-9-CM: 593.9  11/5/2014 - Present        Autoimmune hemolytic anemia ([facility] ICD-10-CM: D59.1  ICD-9-CM: 283.0  11/5/2014 - Present        Leukocytosis ICD-10-CM: V17.434  ICD-9-CM: 288.60  10/25/2014 - Present        HTN (hypertension) ICD-10-CM: I10  ICD-9-CM: 401.9  10/19/2014 - Present        Depression ICD-10-CM: F32.9  ICD-9-CM: 010  10/19/2014 - Present        Hypothyroidism ICD-10-CM: E03.9  ICD-9-CM: 244.9  10/19/2014 - Present        Diarrhea ICD-10-CM: R19.7  ICD-9-CM: 787.91 10/19/2014 - Present        Leukopenia ICD-10-CM: D72.819  ICD-9-CM: 288.50  10/19/2014 - Present        Anemia ICD-10-CM: D64.9  ICD-9-CM: 285.9  10/19/2014 - Present        Transaminitis ICD-10-CM: R74.0  ICD-9-CM: 790.4  10/19/2014 - Present        Hyperbilirubinemia ICD-10-CM: E80.6  ICD-9-CM: 782.4  10/19/2014 - Present        Weakness ICD-10-CM: R53.1  ICD-9-CM: 780.79  10/18/2014 - Present              Subjective:   H&P: Ms. Thanh An is a 80 y.o. female w/ hx of HTN, hypothyroidism, depression, prior PE who presents with low back pain after fall. Per family at bedside, pt has been very weak in recent weeks, intermittent, moderate, worsening, associated with falling at home. Yesterday, pt was walking to bathroom and fell to the floor on her back. Now with severe back pain. No weakness or numbness. No fevers/chills, cough, CP, SOB. Reports nausea. ED workup showed severe NIMCO, leukocytosis. 7/2:  Patient c/o at least a week of nausea, not eating much. No abd. Pain, bowels have been OK. She denies dysuria. She has had chronic back pain, and now worse after her fall. This AM she still is with back pain but improved, she just ate some breakfast and feels like it will probably stay down. 7/3:  Nausea is some better. She wants to see ortho for her back pain. Creat at 3 yesterday. AM labs pending. Nurses report she is not letting them draw labs or start IV. Sees Neuro outpt for dementia. 7/4: Haldol last night around 10pm for agitation/paranoia -- she has been sleeping since. Didn't have her dinner last night. When I wake her up and introduce myself this morning she seems to recognize me, she does not remember her paranoid activities. She know she is in the hospital and admits to back pain. 7/5: Rested comfortably overnight. She has no complaints. She speaks rationally and not delusional at this time. She knows she is in the hospital but not which one.   It turns out she has not had dementia work up in past.  Altered mental status likely due to UTI and underlying dementia. Culture growing [address] which is sensitive to Rocephin. Creat about the same and will need monitoring. Hopefully home tomorrow with outpt work up of dementia but may start namenda at DC if Neuro agrees. :  Cont to improve. No complaints. No further delusional episodes. Memory cont to be poor at times but better at other times - this AM she knows she is in [facility]. Creat slowly improving but may have baseline CKD. For now, per neuro, hold dementia meds until renal function improves. Discussed with DEYSI Plummer [address] daughter and now they want her go to rehab as she was not functioning well in her \"in-law suite. \"      :  She reports no complaints. This AM she knows she is in hospital but not which one. No delusional or agitated behavior. Creat grad improving. Awaiting SNF for rehab. Will DC Rocephin and switch to po Omnicef. Review of Systems:   Review of systems not obtained due to patient factors. Allergies   Allergen Reactions    Percocet [Oxycodone-Acetaminophen] Anxiety     Anxiety, shaking     Pcn [Penicillins] Rash     Patient states this happened ~ 40 years ago  Tolerates ceftriaxone    Sulfa (Sulfonamide Antibiotics) Itching and Vertigo       Objective:   Physical Exam:     Visit Vitals    /75 (BP 1 Location: Right arm, BP Patient Position: At rest)    Pulse 86    Temp 98.9 °F (37.2 °C)    Resp 18    Ht 5' 3\" (1.6 m)    Wt 70.3 kg (155 lb)    SpO2 98%    Breastfeeding No    BMI 27.46 kg/m2      O2 Device: Room air    Temp (24hrs), Av.1 °F (36.7 °C), Min:97.5 °F (36.4 °C), Max:98.9 °F (37.2 °C)             General:  Alert, cooperative, no distress, appears stated age. Head:  Normocephalic, without obvious abnormality, atraumatic. Eyes:  Conjunctivae/corneas clear. PERRL, EOMs intact.    Neck: Supple, symmetrical, trachea midline   Lungs:   Clear to auscultation bilaterally. Heart:  Regular rate and rhythm, S1, S2 normal, no murmur, click, rub or gallop. Abdomen:   Soft, non-tender. Bowel sounds normal. No masses,  No organomegaly. Extremities: Extremities normal, atraumatic, no cyanosis or edema. No calf tenderness or cords. Pulses: 2+ and symmetric all extremities. Skin: Skin color, texture, turgor normal. No rashes or lesions   Neurologic: CNII-XII intact. Alert and oriented X 2 at this moment [person,palce] - waxes and wanes but improved. Data Review:       Recent Days:  Recent Labs      07/07/17   0301  07/06/17   0524  07/05/17   0402   WBC  8.2  7.1  6.9   HGB  10.5*  11.0*  10.8*   HCT  32.0*  33.1*  33.3*   PLT  206  224  222     Recent Labs      07/07/17   0301 07/06/17   0524  07/05/17   0402   NA  141  141  141   K  3.6  3.9  4.0   CL  108  108  108   CO2  21  21  23   GLU  79  73  85   BUN  24*  26*  30*   CREA  2.67*  2.91*  3.32*   CA  7.9*  8.4*  8.2*   ALB  2.8*  2.7*  2.8*   SGOT  15  14*  16   ALT  17  13  15     No results for input(s): PH, PCO2, PO2, HCO3, FIO2 in the last 72 hours. 24 Hour Results:  Recent Results (from the past 24 hour(s))   METABOLIC PANEL, COMPREHENSIVE    Collection Time: 07/07/17  3:01 AM   Result Value Ref Range    Sodium 141 136 - 145 mmol/L    Potassium 3.6 3.5 - 5.1 mmol/L    Chloride 108 97 - 108 mmol/L    CO2 21 21 - 32 mmol/L    Anion gap 12 5 - 15 mmol/L    Glucose 79 65 - 100 mg/dL    BUN 24 (H) 6 - 20 MG/DL    Creatinine 2.67 (H) 0.55 - 1.02 MG/DL    BUN/Creatinine ratio 9 (L) 12 - 20      GFR est AA 21 (L) >60 ml/min/1.73m2    GFR est non-AA 17 (L) >60 ml/min/1.73m2    Calcium 7.9 (L) 8.5 - 10.1 MG/DL    Bilirubin, total 0.3 0.2 - 1.0 MG/DL    ALT (SGPT) 17 12 - 78 U/L    AST (SGOT) 15 15 - 37 U/L    Alk.  phosphatase 89 45 - 117 U/L    Protein, total 5.8 (L) 6.4 - 8.2 g/dL    Albumin 2.8 (L) 3.5 - 5.0 g/dL    Globulin 3.0 2.0 - 4.0 g/dL    A-G Ratio 0.9 (L) 1.1 - 2.2     CBC WITH AUTOMATED DIFF    Collection Time: 07/07/17  3:01 AM   Result Value Ref Range    WBC 8.2 3.6 - 11.0 K/uL    RBC 3.71 (L) 3.80 - 5.20 M/uL    HGB 10.5 (L) 11.5 - 16.0 g/dL    HCT 32.0 (L) 35.0 - 47.0 %    MCV 86.3 80.0 - 99.0 FL    MCH 28.3 26.0 - 34.0 PG    MCHC 32.8 30.0 - 36.5 g/dL    RDW 15.6 (H) 11.5 - 14.5 %    PLATELET 502 690 - 983 K/uL    NEUTROPHILS 65 32 - 75 %    LYMPHOCYTES 19 12 - 49 %    MONOCYTES 12 5 - 13 %    EOSINOPHILS 4 0 - 7 %    BASOPHILS 0 0 - 1 %    ABS. NEUTROPHILS 5.3 1.8 - 8.0 K/UL    ABS. LYMPHOCYTES 1.6 0.8 - 3.5 K/UL    ABS. MONOCYTES 1.0 0.0 - 1.0 K/UL    ABS. EOSINOPHILS 0.3 0.0 - 0.4 K/UL    ABS.  BASOPHILS 0.0 0.0 - 0.1 K/UL       Medications reviewed  Current Facility-Administered Medications   Medication Dose Route Frequency    buPROPion XL (WELLBUTRIN XL) tablet 150 mg  150 mg Oral DAILY    traZODone (DESYREL) tablet 25 mg  25 mg Oral QHS    haloperidol lactate (HALDOL) injection 1 mg  1 mg IntraVENous Q6H PRN    haloperidol lactate (HALDOL) injection 1 mg  1 mg IntraMUSCular Q6H PRN    sodium chloride (NS) flush 5-10 mL  5-10 mL IntraVENous Q8H    sodium chloride (NS) flush 5-10 mL  5-10 mL IntraVENous PRN    naloxone (NARCAN) injection 0.4 mg  0.4 mg IntraVENous PRN    levothyroxine (SYNTHROID) tablet 50 mcg  50 mcg Oral ACB    venlafaxine-SR (EFFEXOR-XR) capsule 225 mg  225 mg Oral DAILY    cefTRIAXone (ROCEPHIN) 1 g in 0.9% sodium chloride (MBP/ADV) 50 mL  1 g IntraVENous Q24H    traMADol (ULTRAM) tablet 50 mg  50 mg Oral Q6H PRN    0.9% sodium chloride infusion  75 mL/hr IntraVENous CONTINUOUS    heparin (porcine) injection 5,000 Units  5,000 Units SubCUTAneous Q8H    ondansetron (ZOFRAN) injection 4 mg  4 mg IntraVENous Q4H PRN       Ange Telles MD

## 2017-07-07 NOTE — PROGRESS NOTES
[facility] Dosing Services: Antimicrobial Stewardship Progress Note    Renal antibiotic dosing of cefdinir for Dr Rosalia Gold  Pharmacist reviewed antibiotic appropriateness for 80year old female  for indication of UTI  Day of Therapy 1 (day 6 of abx therapy as patient completed 5 days of ceftriaxone)    Plan:  Non-Kinetic Antimicrobial Dosing:   Current Regimen:  Cefdinir 300 mg BID  Recommendation: for Est CrCl 15 ml/min, dose with 300 mg q24h  Other Antimicrobial  (not dosed by pharmacist)   none   Cultures     7/2 Urine with > 100,000 [facility], sensi to ceftriaxone   Serum Creatinine     Lab Results   Component Value Date/Time    Creatinine 2.67 07/07/2017 03:01 AM    Creatinine (POC) 2.4 12/21/2014 03:55 PM       Creatinine Clearance Estimated Creatinine Clearance: 14.8 mL/min (based on Cr of 2.67). Temp   98.9 °F (37.2 °C)    WBC   Lab Results   Component Value Date/Time    WBC 8.2 07/07/2017 03:01 AM       H/H   Lab Results   Component Value Date/Time    HGB 10.5 07/07/2017 03:01 AM        Platelets   Lab Results   Component Value Date/Time    PLATELET 924 92/58/9580 03:01 AM          Thank you,    Molly Landry.  Gerardo Ozuna

## 2017-07-07 NOTE — PROGRESS NOTES
[address] [place] [address]  YOB: 1932          Assessment & Plan:   1. NIMCO  - UA: Mild protein,   - US: no hydro  - Cr cont to improve with Volume  - No RRT needed  - Etiology unclear     2. Leukocytosis  - on CTX  3. HTN  - Not on meds       Subjective:   CC:arf  HPI: Patient seen      NIMCO is better  No cp/sob       Current Facility-Administered Medications   Medication Dose Route Frequency    cefdinir (OMNICEF) capsule 300 mg  300 mg Oral DAILY    buPROPion XL (WELLBUTRIN XL) tablet 150 mg  150 mg Oral DAILY    traZODone (DESYREL) tablet 25 mg  25 mg Oral QHS    haloperidol lactate (HALDOL) injection 1 mg  1 mg IntraVENous Q6H PRN    haloperidol lactate (HALDOL) injection 1 mg  1 mg IntraMUSCular Q6H PRN    sodium chloride (NS) flush 5-10 mL  5-10 mL IntraVENous Q8H    sodium chloride (NS) flush 5-10 mL  5-10 mL IntraVENous PRN    naloxone (NARCAN) injection 0.4 mg  0.4 mg IntraVENous PRN    levothyroxine (SYNTHROID) tablet 50 mcg  50 mcg Oral ACB    venlafaxine-SR (EFFEXOR-XR) capsule 225 mg  225 mg Oral DAILY    traMADol (ULTRAM) tablet 50 mg  50 mg Oral Q6H PRN    heparin (porcine) injection 5,000 Units  5,000 Units SubCUTAneous Q8H    ondansetron (ZOFRAN) injection 4 mg  4 mg IntraVENous Q4H PRN          Objective:     Vitals:  Blood pressure 153/69, pulse 78, temperature 99.3 °F (37.4 °C), resp. rate 16, height 5' 3\" (1.6 m), weight 70.3 kg (155 lb), SpO2 95 %, not currently breastfeeding.   Temp (24hrs), Av.2 °F (36.8 °C), Min:97.5 °F (36.4 °C), Max:99.3 °F (37.4 °C)      Intake and Output:   07 -  1900  In: 120 [P.O.:120]  Out: -        Physical Exam:                Patient is intubated:  no    Physical Examination:   GENERAL ASSESSMENT: NAD  HEENT:Nontraumatic   CHEST: CTA  HEART: S1S2  ABDOMEN: Soft,NT,  :Rivera: n  EXTREMITY: EDEMA  NEURO:Grossly non focal          ECG/rhythm:    Data Review      No results for input(s): TNIPOC in the last 72 hours. No lab exists for component: ITNL   No results for input(s): CPK, CKMB, TROIQ in the last 72 hours. Recent Labs      07/07/17   0301  07/06/17   0524  07/05/17   0402   NA  141  141  141   K  3.6  3.9  4.0   CL  108  108  108   CO2  21  21  23   BUN  24*  26*  30*   CREA  2.67*  2.91*  3.32*   GLU  79  73  85   CA  7.9*  8.4*  8.2*   ALB  2.8*  2.7*  2.8*   WBC  8.2  7.1  6.9   HGB  10.5*  11.0*  10.8*   HCT  32.0*  33.1*  33.3*   PLT  206  224  222      No results for input(s): INR, PTP, APTT in the last 72 hours. No lab exists for component: INREXT, INREXT  Needs: urine analysis, urine sodium, protein and creatinine  Lab Results   Component Value Date/Time    Sodium urine, random 55 07/02/2017 02:41 AM    Creatinine, urine 187.78 12/21/2014 03:50 PM         Discussed with:  Family, Colleague    : Armond Cuellar MD  7/7/2017        [facility]:  [email or web address]  [place] office:  [address] [address]  [place], [address] [place] [address]  Phone: [phone]  Fax :     [phone]    [place] office:  [address]  Phone - [phone]  Fax - [phone]

## 2017-07-07 NOTE — ROUTINE PROCESS
Bedside and Verbal shift change report given to Yasir Pedersen and Cami Puente RN (oncoming nurse) by PROMISE Joshua (offgoing nurse). Report included the following information SBAR, Kardex, ED Summary, Procedure Summary, Intake/Output, MAR, Accordion, Recent Results and Med Rec Status.

## 2017-07-08 PROCEDURE — 74011250637 HC RX REV CODE- 250/637: Performed by: INTERNAL MEDICINE

## 2017-07-08 PROCEDURE — 74011250636 HC RX REV CODE- 250/636: Performed by: INTERNAL MEDICINE

## 2017-07-08 PROCEDURE — 65270000029 HC RM PRIVATE

## 2017-07-08 PROCEDURE — 74011250637 HC RX REV CODE- 250/637: Performed by: PSYCHIATRY & NEUROLOGY

## 2017-07-08 PROCEDURE — 74011250637 HC RX REV CODE- 250/637: Performed by: FAMILY MEDICINE

## 2017-07-08 RX ADMIN — LEVOTHYROXINE SODIUM 50 MCG: 50 TABLET ORAL at 06:41

## 2017-07-08 RX ADMIN — HEPARIN SODIUM 5000 UNITS: 5000 INJECTION, SOLUTION INTRAVENOUS; SUBCUTANEOUS at 14:05

## 2017-07-08 RX ADMIN — CEFDINIR 300 MG: 300 CAPSULE ORAL at 10:34

## 2017-07-08 RX ADMIN — TRAMADOL HYDROCHLORIDE 50 MG: 50 TABLET, FILM COATED ORAL at 11:22

## 2017-07-08 RX ADMIN — Medication 10 ML: at 05:03

## 2017-07-08 RX ADMIN — HEPARIN SODIUM 5000 UNITS: 5000 INJECTION, SOLUTION INTRAVENOUS; SUBCUTANEOUS at 22:15

## 2017-07-08 RX ADMIN — HEPARIN SODIUM 5000 UNITS: 5000 INJECTION, SOLUTION INTRAVENOUS; SUBCUTANEOUS at 04:56

## 2017-07-08 RX ADMIN — VENLAFAXINE HYDROCHLORIDE 225 MG: 150 CAPSULE, EXTENDED RELEASE ORAL at 10:34

## 2017-07-08 RX ADMIN — BUPROPION HYDROCHLORIDE 150 MG: 150 TABLET, FILM COATED, EXTENDED RELEASE ORAL at 10:34

## 2017-07-08 RX ADMIN — TRAZODONE HYDROCHLORIDE 25 MG: 50 TABLET ORAL at 22:16

## 2017-07-08 RX ADMIN — TRAMADOL HYDROCHLORIDE 50 MG: 50 TABLET, FILM COATED ORAL at 22:16

## 2017-07-08 NOTE — PROGRESS NOTES
Daily Progress Note: 7/8/2017  Maria Fernanda Piper-Kaylan Bal MD    Assessment/Plan:   NIMCO: likely with some degree of chronic: unclear etiology.  Had mild IVVD  --Cr improving slowly  --normal renal US  --nephrology followed     UTI -- culture shows gram neg rods - ESCHERICHIA FERGUSONII  --received rocephin inpt x 5 days - allergic to Pen and Sulfa according to allergy list - discussed with Pharm and will switch to po Omnicef     Back pain - superior endplate depression, mild L3  --pain control      HTN: watch    Depression/Paranoia/agitation/Dementia with behavioral disturbance likely worsened by UTI  --Psych has seen, recommend neuro consult and meds have been adjusted  --haldol prn agitation  --improved with tx of UTI    Hypothyroidism  -- cont supplement     History of PE: not on anticoagulation  -- DVT ppx on heparin SQ      Weakness / Fall: lives with grand-daughter   -- PT / Mike Mercury, fall precautions         Problem List:  Problem List as of 7/8/2017  Date Reviewed: 7/2/2017          Codes Class Noted - Resolved    UTI (urinary tract infection) ICD-10-CM: N39.0  ICD-9-CM: 599.0  7/2/2017 - Present        Acute respiratory failure ([facility] ICD-10-CM: J96.00  ICD-9-CM: 518.81  12/21/2014 - Present        PE (pulmonary embolism) ICD-10-CM: I26.99  ICD-9-CM: 415.19  12/11/2014 - Present        NIMCO (acute kidney injury) ([facility].) ICD-10-CM: N17.9  ICD-9-CM: 584.9  12/11/2014 - Present        Renal lesion ICD-10-CM: N28.9  ICD-9-CM: 593.9  11/5/2014 - Present        Autoimmune hemolytic anemia ([email or web address] ICD-10-CM: D59.1  ICD-9-CM: 283.0  11/5/2014 - Present        Leukocytosis ICD-10-CM: K71.147  ICD-9-CM: 288.60  10/25/2014 - Present        HTN (hypertension) ICD-10-CM: I10  ICD-9-CM: 401.9  10/19/2014 - Present        Depression ICD-10-CM: F32.9  ICD-9-CM: 159  10/19/2014 - Present        Hypothyroidism ICD-10-CM: E03.9  ICD-9-CM: 244.9  10/19/2014 - Present        Diarrhea ICD-10-CM: R19.7  ICD-9-CM: 787.91 10/19/2014 - Present        Leukopenia ICD-10-CM: D72.819  ICD-9-CM: 288.50  10/19/2014 - Present        Anemia ICD-10-CM: D64.9  ICD-9-CM: 285.9  10/19/2014 - Present        Transaminitis ICD-10-CM: R74.0  ICD-9-CM: 790.4  10/19/2014 - Present        Hyperbilirubinemia ICD-10-CM: E80.6  ICD-9-CM: 782.4  10/19/2014 - Present        Weakness ICD-10-CM: R53.1  ICD-9-CM: 780.79  10/18/2014 - Present              Subjective:   H&P: Ms. Dago Cowan is a 80 y.o. female w/ hx of HTN, hypothyroidism, depression, prior PE who presents with low back pain after fall. Per family at bedside, pt has been very weak in recent weeks, intermittent, moderate, worsening, associated with falling at home. Yesterday, pt was walking to bathroom and fell to the floor on her back. Now with severe back pain. No weakness or numbness. No fevers/chills, cough, CP, SOB. Reports nausea. ED workup showed severe NIMCO, leukocytosis. 7/2:  Patient c/o at least a week of nausea, not eating much. No abd. Pain, bowels have been OK. She denies dysuria. She has had chronic back pain, and now worse after her fall. This AM she still is with back pain but improved, she just ate some breakfast and feels like it will probably stay down. 7/3:  Nausea is some better. She wants to see ortho for her back pain. Creat at 3 yesterday. AM labs pending. Nurses report she is not letting them draw labs or start IV. Sees Neuro outpt for dementia. 7/4: Haldol last night around 10pm for agitation/paranoia -- she has been sleeping since. Didn't have her dinner last night. When I wake her up and introduce myself this morning she seems to recognize me, she does not remember her paranoid activities. She know she is in the hospital and admits to back pain. 7/5: Rested comfortably overnight. She has no complaints. She speaks rationally and not delusional at this time. She knows she is in the hospital but not which one.   It turns out she has not had dementia work up in past.  Altered mental status likely due to UTI and underlying dementia. Culture growing [address] which is sensitive to Rocephin. Creat about the same and will need monitoring. Hopefully home tomorrow with outpt work up of dementia but may start namenda at DC if Neuro agrees. :  Cont to improve. No complaints. No further delusional episodes. Memory cont to be poor at times but better at other times - this AM she knows she is in [facility]. Creat slowly improving but may have baseline CKD. For now, per neuro, hold dementia meds until renal function improves. Discussed with [address] daughter and now they want her go to rehab as she was not functioning well in her \"in-law suite. \"      :  She reports no complaints. This AM she knows she is in hospital but not which one. No delusional or agitated behavior. Creat grad improving. Awaiting SNF for rehab. Will DC Rocephin and switch to po Omnicef. :  No complaints. Sitting up in bed eating breakfast.  Tolerating po Omnicef. Awaiting SNF for rehab. Review of Systems:   Review of systems not obtained due to patient factors. Allergies   Allergen Reactions    Percocet [Oxycodone-Acetaminophen] Anxiety     Anxiety, shaking     Pcn [Penicillins] Rash     Patient states this happened ~ 40 years ago  Tolerates ceftriaxone    Sulfa (Sulfonamide Antibiotics) Itching and Vertigo       Objective:   Physical Exam:     Visit Vitals    /74 (BP 1 Location: Right arm, BP Patient Position: At rest)    Pulse 72    Temp 98.4 °F (36.9 °C)    Resp 18    Ht 5' 3\" (1.6 m)    Wt 70.3 kg (155 lb)    SpO2 100%    Breastfeeding No    BMI 27.46 kg/m2      O2 Device: Room air    Temp (24hrs), Av.5 °F (36.9 °C), Min:97.8 °F (36.6 °C), Max:99.3 °F (37.4 °C)         1901 -  0700  In: 120 [P.O.:120]  Out: -     General:  Alert, cooperative, no distress, appears stated age.    Head:  Normocephalic, without obvious abnormality, atraumatic. Eyes:  Conjunctivae/corneas clear. PERRL, EOMs intact. Neck: Supple, symmetrical, trachea midline   Lungs:   Clear to auscultation bilaterally. Heart:  Regular rate and rhythm, S1, S2 normal, no murmur, click, rub or gallop. Abdomen:   Soft, non-tender. Bowel sounds normal. No masses,  No organomegaly. Extremities: Extremities normal, atraumatic, no cyanosis or edema. No calf tenderness or cords. Pulses: 2+ and symmetric all extremities. Skin: Skin color, texture, turgor normal. No rashes or lesions   Neurologic: CNII-XII intact. Alert and oriented X 2 at this moment [person,palce] - waxes and wanes but improved. Data Review:       Recent Days:  Recent Labs      07/07/17   0301  07/06/17   0524   WBC  8.2  7.1   HGB  10.5*  11.0*   HCT  32.0*  33.1*   PLT  206  224     Recent Labs      07/07/17   0301 07/06/17   0524   NA  141  141   K  3.6  3.9   CL  108  108   CO2  21  21   GLU  79  73   BUN  24*  26*   CREA  2.67*  2.91*   CA  7.9*  8.4*   ALB  2.8*  2.7*   SGOT  15  14*   ALT  17  13     No results for input(s): PH, PCO2, PO2, HCO3, FIO2 in the last 72 hours. 24 Hour Results:  No results found for this or any previous visit (from the past 24 hour(s)).     Medications reviewed  Current Facility-Administered Medications   Medication Dose Route Frequency    cefdinir (OMNICEF) capsule 300 mg  300 mg Oral DAILY    buPROPion XL (WELLBUTRIN XL) tablet 150 mg  150 mg Oral DAILY    traZODone (DESYREL) tablet 25 mg  25 mg Oral QHS    haloperidol lactate (HALDOL) injection 1 mg  1 mg IntraVENous Q6H PRN    haloperidol lactate (HALDOL) injection 1 mg  1 mg IntraMUSCular Q6H PRN    sodium chloride (NS) flush 5-10 mL  5-10 mL IntraVENous Q8H    sodium chloride (NS) flush 5-10 mL  5-10 mL IntraVENous PRN    naloxone (NARCAN) injection 0.4 mg  0.4 mg IntraVENous PRN    levothyroxine (SYNTHROID) tablet 50 mcg  50 mcg Oral ACB    venlafaxine-SR (EFFEXOR-XR) capsule 225 mg  225 mg Oral DAILY    traMADol (ULTRAM) tablet 50 mg  50 mg Oral Q6H PRN    heparin (porcine) injection 5,000 Units  5,000 Units SubCUTAneous Q8H    ondansetron (ZOFRAN) injection 4 mg  4 mg IntraVENous Q4H PRN       Salina Fabry, MD

## 2017-07-08 NOTE — ROUTINE PROCESS
Bedside and Verbal shift change report given to Tereza Alberto RN (oncoming nurse) by Edda Horvath RN (offgoing nurse). Report included the following information SBAR, Kardex, ED Summary, Intake/Output, MAR and Recent Results.

## 2017-07-08 NOTE — PROGRESS NOTES
Bedside and Verbal shift change report given to [address] [place] [address] (oncoming nurse) by Eamon Smith RN (offgoing nurse). Report included the following information SBAR, Kardex, Intake/Output, MAR, Accordion, Recent Results and Med Rec Status.

## 2017-07-09 LAB
ALBUMIN SERPL BCP-MCNC: 2.8 G/DL (ref 3.5–5)
ALBUMIN/GLOB SERPL: 0.9 {RATIO} (ref 1.1–2.2)
ALP SERPL-CCNC: 89 U/L (ref 45–117)
ALT SERPL-CCNC: 15 U/L (ref 12–78)
ANION GAP BLD CALC-SCNC: 11 MMOL/L (ref 5–15)
AST SERPL W P-5'-P-CCNC: 13 U/L (ref 15–37)
BASOPHILS # BLD AUTO: 0 K/UL (ref 0–0.1)
BASOPHILS # BLD: 0 % (ref 0–1)
BILIRUB SERPL-MCNC: 0.2 MG/DL (ref 0.2–1)
BUN SERPL-MCNC: 25 MG/DL (ref 6–20)
BUN/CREAT SERPL: 10 (ref 12–20)
CALCIUM SERPL-MCNC: 7.8 MG/DL (ref 8.5–10.1)
CHLORIDE SERPL-SCNC: 106 MMOL/L (ref 97–108)
CO2 SERPL-SCNC: 23 MMOL/L (ref 21–32)
CREAT SERPL-MCNC: 2.56 MG/DL (ref 0.55–1.02)
EOSINOPHIL # BLD: 0.3 K/UL (ref 0–0.4)
EOSINOPHIL NFR BLD: 4 % (ref 0–7)
ERYTHROCYTE [DISTWIDTH] IN BLOOD BY AUTOMATED COUNT: 15.8 % (ref 11.5–14.5)
GLOBULIN SER CALC-MCNC: 3.2 G/DL (ref 2–4)
GLUCOSE SERPL-MCNC: 88 MG/DL (ref 65–100)
HCT VFR BLD AUTO: 32.6 % (ref 35–47)
HGB BLD-MCNC: 10.7 G/DL (ref 11.5–16)
LYMPHOCYTES # BLD AUTO: 20 % (ref 12–49)
LYMPHOCYTES # BLD: 1.8 K/UL (ref 0.8–3.5)
MAGNESIUM SERPL-MCNC: 1.3 MG/DL (ref 1.6–2.4)
MCH RBC QN AUTO: 28.4 PG (ref 26–34)
MCHC RBC AUTO-ENTMCNC: 32.8 G/DL (ref 30–36.5)
MCV RBC AUTO: 86.5 FL (ref 80–99)
MONOCYTES # BLD: 1 K/UL (ref 0–1)
MONOCYTES NFR BLD AUTO: 11 % (ref 5–13)
NEUTS SEG # BLD: 5.7 K/UL (ref 1.8–8)
NEUTS SEG NFR BLD AUTO: 65 % (ref 32–75)
PLATELET # BLD AUTO: 202 K/UL (ref 150–400)
POTASSIUM SERPL-SCNC: 3.2 MMOL/L (ref 3.5–5.1)
PROT SERPL-MCNC: 6 G/DL (ref 6.4–8.2)
RBC # BLD AUTO: 3.77 M/UL (ref 3.8–5.2)
SODIUM SERPL-SCNC: 140 MMOL/L (ref 136–145)
WBC # BLD AUTO: 8.8 K/UL (ref 3.6–11)

## 2017-07-09 PROCEDURE — 74011250637 HC RX REV CODE- 250/637: Performed by: PSYCHIATRY & NEUROLOGY

## 2017-07-09 PROCEDURE — 85025 COMPLETE CBC W/AUTO DIFF WBC: CPT | Performed by: FAMILY MEDICINE

## 2017-07-09 PROCEDURE — 74011250636 HC RX REV CODE- 250/636: Performed by: INTERNAL MEDICINE

## 2017-07-09 PROCEDURE — 83735 ASSAY OF MAGNESIUM: CPT | Performed by: FAMILY MEDICINE

## 2017-07-09 PROCEDURE — 65270000029 HC RM PRIVATE

## 2017-07-09 PROCEDURE — 74011250637 HC RX REV CODE- 250/637: Performed by: FAMILY MEDICINE

## 2017-07-09 PROCEDURE — 36415 COLL VENOUS BLD VENIPUNCTURE: CPT | Performed by: FAMILY MEDICINE

## 2017-07-09 PROCEDURE — 74011250637 HC RX REV CODE- 250/637: Performed by: INTERNAL MEDICINE

## 2017-07-09 PROCEDURE — 80053 COMPREHEN METABOLIC PANEL: CPT | Performed by: FAMILY MEDICINE

## 2017-07-09 RX ORDER — POTASSIUM CHLORIDE 750 MG/1
10 TABLET, FILM COATED, EXTENDED RELEASE ORAL 2 TIMES DAILY
Status: DISCONTINUED | OUTPATIENT
Start: 2017-07-09 | End: 2017-07-11 | Stop reason: HOSPADM

## 2017-07-09 RX ORDER — LANOLIN ALCOHOL/MO/W.PET/CERES
400 CREAM (GRAM) TOPICAL 2 TIMES DAILY
Status: DISCONTINUED | OUTPATIENT
Start: 2017-07-09 | End: 2017-07-11 | Stop reason: HOSPADM

## 2017-07-09 RX ADMIN — Medication 10 ML: at 13:44

## 2017-07-09 RX ADMIN — LEVOTHYROXINE SODIUM 50 MCG: 50 TABLET ORAL at 07:30

## 2017-07-09 RX ADMIN — TRAZODONE HYDROCHLORIDE 25 MG: 50 TABLET ORAL at 21:21

## 2017-07-09 RX ADMIN — MAGNESIUM GLUCONATE 500 MG ORAL TABLET 400 MG: 500 TABLET ORAL at 17:52

## 2017-07-09 RX ADMIN — BUPROPION HYDROCHLORIDE 150 MG: 150 TABLET, FILM COATED, EXTENDED RELEASE ORAL at 09:10

## 2017-07-09 RX ADMIN — CEFDINIR 300 MG: 300 CAPSULE ORAL at 09:10

## 2017-07-09 RX ADMIN — HEPARIN SODIUM 5000 UNITS: 5000 INJECTION, SOLUTION INTRAVENOUS; SUBCUTANEOUS at 13:43

## 2017-07-09 RX ADMIN — VENLAFAXINE HYDROCHLORIDE 225 MG: 150 CAPSULE, EXTENDED RELEASE ORAL at 09:10

## 2017-07-09 RX ADMIN — POTASSIUM CHLORIDE 10 MEQ: 750 TABLET, FILM COATED, EXTENDED RELEASE ORAL at 17:52

## 2017-07-09 RX ADMIN — POTASSIUM CHLORIDE 10 MEQ: 750 TABLET, FILM COATED, EXTENDED RELEASE ORAL at 09:10

## 2017-07-09 RX ADMIN — MAGNESIUM GLUCONATE 500 MG ORAL TABLET 400 MG: 500 TABLET ORAL at 09:10

## 2017-07-09 RX ADMIN — HEPARIN SODIUM 5000 UNITS: 5000 INJECTION, SOLUTION INTRAVENOUS; SUBCUTANEOUS at 05:56

## 2017-07-09 RX ADMIN — HEPARIN SODIUM 5000 UNITS: 5000 INJECTION, SOLUTION INTRAVENOUS; SUBCUTANEOUS at 21:22

## 2017-07-09 NOTE — PROGRESS NOTES
Daily Progress Note: 7/9/2017  Maria Fernanda Piper-Umlang  Jed Mccoy MD    Assessment/Plan:   NIMCO: likely with some degree of chronic: unclear etiology.  Had mild IVVD  --Cr improving slowly  --normal renal US  --nephrology followed     UTI -- culture shows gram neg rods - ESCHERICHIA FERGUSONII  --received rocephin inpt x 5 days - allergic to Pen and Sulfa according to allergy list - discussed with Pharm and will switch to po Omnicef     Back pain - superior endplate depression, mild L3  --pain control      HTN: watch    Depression/Paranoia/agitation/Dementia with behavioral disturbance likely worsened by UTI  --Psych has seen, recommend neuro consult and meds have been adjusted  --haldol prn agitation  --improved with tx of UTI    Hypothyroidism  -- cont supplement     History of PE: not on anticoagulation  -- DVT ppx on heparin SQ      Weakness / Fall: lives with grand-daughter   -- PT / Savilla Flatter, fall precautions         Problem List:  Problem List as of 7/9/2017  Date Reviewed: 7/2/2017          Codes Class Noted - Resolved    UTI (urinary tract infection) ICD-10-CM: N39.0  ICD-9-CM: 599.0  7/2/2017 - Present        Acute respiratory failure ([facility] ICD-10-CM: J96.00  ICD-9-CM: 518.81  12/21/2014 - Present        PE (pulmonary embolism) ICD-10-CM: I26.99  ICD-9-CM: 415.19  12/11/2014 - Present        NIMCO (acute kidney injury) ([facility] ICD-10-CM: N17.9  ICD-9-CM: 584.9  12/11/2014 - Present        Renal lesion ICD-10-CM: N28.9  ICD-9-CM: 593.9  11/5/2014 - Present        Autoimmune hemolytic anemia ([facility] ICD-10-CM: D59.1  ICD-9-CM: 283.0  11/5/2014 - Present        Leukocytosis ICD-10-CM: J01.612  ICD-9-CM: 288.60  10/25/2014 - Present        HTN (hypertension) ICD-10-CM: I10  ICD-9-CM: 401.9  10/19/2014 - Present        Depression ICD-10-CM: F32.9  ICD-9-CM: 141  10/19/2014 - Present        Hypothyroidism ICD-10-CM: E03.9  ICD-9-CM: 244.9  10/19/2014 - Present        Diarrhea ICD-10-CM: R19.7  ICD-9-CM: 787.91 10/19/2014 - Present        Leukopenia ICD-10-CM: D72.819  ICD-9-CM: 288.50  10/19/2014 - Present        Anemia ICD-10-CM: D64.9  ICD-9-CM: 285.9  10/19/2014 - Present        Transaminitis ICD-10-CM: R74.0  ICD-9-CM: 790.4  10/19/2014 - Present        Hyperbilirubinemia ICD-10-CM: E80.6  ICD-9-CM: 782.4  10/19/2014 - Present        Weakness ICD-10-CM: R53.1  ICD-9-CM: 780.79  10/18/2014 - Present              Subjective:   H&P: Ms. Yesenia Spring is a 80 y.o. female w/ hx of HTN, hypothyroidism, depression, prior PE who presents with low back pain after fall. Per family at bedside, pt has been very weak in recent weeks, intermittent, moderate, worsening, associated with falling at home. Yesterday, pt was walking to bathroom and fell to the floor on her back. Now with severe back pain. No weakness or numbness. No fevers/chills, cough, CP, SOB. Reports nausea. ED workup showed severe NIMCO, leukocytosis. 7/2:  Patient c/o at least a week of nausea, not eating much. No abd. Pain, bowels have been OK. She denies dysuria. She has had chronic back pain, and now worse after her fall. This AM she still is with back pain but improved, she just ate some breakfast and feels like it will probably stay down. 7/3:  Nausea is some better. She wants to see ortho for her back pain. Creat at 3 yesterday. AM labs pending. Nurses report she is not letting them draw labs or start IV. Sees Neuro outpt for dementia. 7/4: Haldol last night around 10pm for agitation/paranoia -- she has been sleeping since. Didn't have her dinner last night. When I wake her up and introduce myself this morning she seems to recognize me, she does not remember her paranoid activities. She know she is in the hospital and admits to back pain. 7/5: Rested comfortably overnight. She has no complaints. She speaks rationally and not delusional at this time. She knows she is in the hospital but not which one.   It turns out she has not had dementia work up in past.  Altered mental status likely due to UTI and underlying dementia. Culture growing [address] which is sensitive to Rocephin. Creat about the same and will need monitoring. Hopefully home tomorrow with outpt work up of dementia but may start namenda at DC if Neuro agrees. :  Cont to improve. No complaints. No further delusional episodes. Memory cont to be poor at times but better at other times - this AM she knows she is in [facility] . Creat slowly improving but may have baseline CKD. For now, per neuro, hold dementia meds until renal function improves. Discussed with [address] daughter and now they want her go to rehab as she was not functioning well in her \"in-law suite. \"      :  She reports no complaints. This AM she knows she is in hospital but not which one. No delusional or agitated behavior. Creat grad improving. Awaiting SNF for rehab. Will DC Rocephin and switch to po Omnicef. :  No complaints. Sitting up in bed eating breakfast.  Tolerating po Omnicef. Awaiting SNF for rehab.     :  No new problems or complaints. K+ and Mag a little low - replacing po for now. Creat grad improving. Review of Systems:   Review of systems not obtained due to patient factors.     Allergies   Allergen Reactions    Percocet [Oxycodone-Acetaminophen] Anxiety     Anxiety, shaking     Pcn [Penicillins] Rash     Patient states this happened ~ 40 years ago  Tolerates ceftriaxone    Sulfa (Sulfonamide Antibiotics) Itching and Vertigo       Objective:   Physical Exam:     Visit Vitals    /72 (BP 1 Location: Left arm, BP Patient Position: At rest)    Pulse 77    Temp 98.5 °F (36.9 °C)    Resp 16    Ht 5' 3\" (1.6 m)    Wt 70.3 kg (155 lb)    SpO2 94%    Breastfeeding No    BMI 27.46 kg/m2      O2 Device: Room air    Temp (24hrs), Av.5 °F (36.9 °C), Min:98.1 °F (36.7 °C), Max:98.7 °F (37.1 °C)         1901 -  0700  In: 60 [P.O.:60]  Out: -     General:  Alert, cooperative, no distress, appears stated age. Head:  Normocephalic, without obvious abnormality, atraumatic. Eyes:  Conjunctivae/corneas clear. PERRL, EOMs intact. Neck: Supple, symmetrical, trachea midline   Lungs:   Clear to auscultation bilaterally. Heart:  Regular rate and rhythm, S1, S2 normal, no murmur, click, rub or gallop. Abdomen:   Soft, non-tender. Bowel sounds normal. No masses,  No organomegaly. Extremities: Extremities normal, atraumatic, no cyanosis or edema. No calf tenderness or cords. Pulses: 2+ and symmetric all extremities. Skin: Skin color, texture, turgor normal. No rashes or lesions   Neurologic: CNII-XII intact. Alert and oriented X 2 at this moment [person,palce] - waxes and wanes but improved. Data Review:       Recent Days:  Recent Labs      07/09/17   0429  07/07/17   0301   WBC  8.8  8.2   HGB  10.7*  10.5*   HCT  32.6*  32.0*   PLT  202  206     Recent Labs      07/09/17   0429  07/07/17   0301   NA  140  141   K  3.2*  3.6   CL  106  108   CO2  23  21   GLU  88  79   BUN  25*  24*   CREA  2.56*  2.67*   CA  7.8*  7.9*   MG  1.3*   --    ALB  2.8*  2.8*   SGOT  13*  15   ALT  15  17     No results for input(s): PH, PCO2, PO2, HCO3, FIO2 in the last 72 hours.     24 Hour Results:  Recent Results (from the past 24 hour(s))   METABOLIC PANEL, COMPREHENSIVE    Collection Time: 07/09/17  4:29 AM   Result Value Ref Range    Sodium 140 136 - 145 mmol/L    Potassium 3.2 (L) 3.5 - 5.1 mmol/L    Chloride 106 97 - 108 mmol/L    CO2 23 21 - 32 mmol/L    Anion gap 11 5 - 15 mmol/L    Glucose 88 65 - 100 mg/dL    BUN 25 (H) 6 - 20 MG/DL    Creatinine 2.56 (H) 0.55 - 1.02 MG/DL    BUN/Creatinine ratio 10 (L) 12 - 20      GFR est AA 22 (L) >60 ml/min/1.73m2    GFR est non-AA 18 (L) >60 ml/min/1.73m2    Calcium 7.8 (L) 8.5 - 10.1 MG/DL    Bilirubin, total 0.2 0.2 - 1.0 MG/DL    ALT (SGPT) 15 12 - 78 U/L    AST (SGOT) 13 (L) 15 - 37 U/L Alk. phosphatase 89 45 - 117 U/L    Protein, total 6.0 (L) 6.4 - 8.2 g/dL    Albumin 2.8 (L) 3.5 - 5.0 g/dL    Globulin 3.2 2.0 - 4.0 g/dL    A-G Ratio 0.9 (L) 1.1 - 2.2     MAGNESIUM    Collection Time: 07/09/17  4:29 AM   Result Value Ref Range    Magnesium 1.3 (L) 1.6 - 2.4 mg/dL   CBC WITH AUTOMATED DIFF    Collection Time: 07/09/17  4:29 AM   Result Value Ref Range    WBC 8.8 3.6 - 11.0 K/uL    RBC 3.77 (L) 3.80 - 5.20 M/uL    HGB 10.7 (L) 11.5 - 16.0 g/dL    HCT 32.6 (L) 35.0 - 47.0 %    MCV 86.5 80.0 - 99.0 FL    MCH 28.4 26.0 - 34.0 PG    MCHC 32.8 30.0 - 36.5 g/dL    RDW 15.8 (H) 11.5 - 14.5 %    PLATELET 823 425 - 187 K/uL    NEUTROPHILS 65 32 - 75 %    LYMPHOCYTES 20 12 - 49 %    MONOCYTES 11 5 - 13 %    EOSINOPHILS 4 0 - 7 %    BASOPHILS 0 0 - 1 %    ABS. NEUTROPHILS 5.7 1.8 - 8.0 K/UL    ABS. LYMPHOCYTES 1.8 0.8 - 3.5 K/UL    ABS. MONOCYTES 1.0 0.0 - 1.0 K/UL    ABS. EOSINOPHILS 0.3 0.0 - 0.4 K/UL    ABS.  BASOPHILS 0.0 0.0 - 0.1 K/UL       Medications reviewed  Current Facility-Administered Medications   Medication Dose Route Frequency    cefdinir (OMNICEF) capsule 300 mg  300 mg Oral DAILY    buPROPion XL (WELLBUTRIN XL) tablet 150 mg  150 mg Oral DAILY    traZODone (DESYREL) tablet 25 mg  25 mg Oral QHS    haloperidol lactate (HALDOL) injection 1 mg  1 mg IntraVENous Q6H PRN    haloperidol lactate (HALDOL) injection 1 mg  1 mg IntraMUSCular Q6H PRN    sodium chloride (NS) flush 5-10 mL  5-10 mL IntraVENous Q8H    sodium chloride (NS) flush 5-10 mL  5-10 mL IntraVENous PRN    naloxone (NARCAN) injection 0.4 mg  0.4 mg IntraVENous PRN    levothyroxine (SYNTHROID) tablet 50 mcg  50 mcg Oral ACB    venlafaxine-SR (EFFEXOR-XR) capsule 225 mg  225 mg Oral DAILY    traMADol (ULTRAM) tablet 50 mg  50 mg Oral Q6H PRN    heparin (porcine) injection 5,000 Units  5,000 Units SubCUTAneous Q8H    ondansetron (ZOFRAN) injection 4 mg  4 mg IntraVENous Q4H PRN       Alen Kohler MD

## 2017-07-09 NOTE — PROGRESS NOTES
Bedside and Verbal shift change report given to [facility] (oncoming nurse) by Selene Mariee (offgoing nurse). Report included the following information SBAR, MAR, Accordion and Recent Results.

## 2017-07-10 LAB
ALBUMIN SERPL BCP-MCNC: 2.8 G/DL (ref 3.5–5)
ALBUMIN/GLOB SERPL: 0.8 {RATIO} (ref 1.1–2.2)
ALP SERPL-CCNC: 94 U/L (ref 45–117)
ALT SERPL-CCNC: 12 U/L (ref 12–78)
ANION GAP BLD CALC-SCNC: 11 MMOL/L (ref 5–15)
AST SERPL W P-5'-P-CCNC: 11 U/L (ref 15–37)
BILIRUB SERPL-MCNC: 0.2 MG/DL (ref 0.2–1)
BUN SERPL-MCNC: 26 MG/DL (ref 6–20)
BUN/CREAT SERPL: 10 (ref 12–20)
CALCIUM SERPL-MCNC: 8.4 MG/DL (ref 8.5–10.1)
CHLORIDE SERPL-SCNC: 105 MMOL/L (ref 97–108)
CO2 SERPL-SCNC: 21 MMOL/L (ref 21–32)
CREAT SERPL-MCNC: 2.7 MG/DL (ref 0.55–1.02)
GLOBULIN SER CALC-MCNC: 3.3 G/DL (ref 2–4)
GLUCOSE SERPL-MCNC: 145 MG/DL (ref 65–100)
MAGNESIUM SERPL-MCNC: 1.5 MG/DL (ref 1.6–2.4)
POTASSIUM SERPL-SCNC: 3.6 MMOL/L (ref 3.5–5.1)
PROT SERPL-MCNC: 6.1 G/DL (ref 6.4–8.2)
SODIUM SERPL-SCNC: 137 MMOL/L (ref 136–145)

## 2017-07-10 PROCEDURE — 74011250637 HC RX REV CODE- 250/637: Performed by: FAMILY MEDICINE

## 2017-07-10 PROCEDURE — 97530 THERAPEUTIC ACTIVITIES: CPT

## 2017-07-10 PROCEDURE — 83735 ASSAY OF MAGNESIUM: CPT | Performed by: FAMILY MEDICINE

## 2017-07-10 PROCEDURE — 80053 COMPREHEN METABOLIC PANEL: CPT | Performed by: FAMILY MEDICINE

## 2017-07-10 PROCEDURE — 74011250637 HC RX REV CODE- 250/637: Performed by: INTERNAL MEDICINE

## 2017-07-10 PROCEDURE — 77030018846 HC SOL IRR STRL H20 ICUM -A

## 2017-07-10 PROCEDURE — 97535 SELF CARE MNGMENT TRAINING: CPT

## 2017-07-10 PROCEDURE — 65270000029 HC RM PRIVATE

## 2017-07-10 PROCEDURE — 97116 GAIT TRAINING THERAPY: CPT

## 2017-07-10 PROCEDURE — 74011250636 HC RX REV CODE- 250/636: Performed by: INTERNAL MEDICINE

## 2017-07-10 PROCEDURE — 36415 COLL VENOUS BLD VENIPUNCTURE: CPT | Performed by: FAMILY MEDICINE

## 2017-07-10 PROCEDURE — 74011250637 HC RX REV CODE- 250/637: Performed by: PSYCHIATRY & NEUROLOGY

## 2017-07-10 RX ADMIN — HEPARIN SODIUM 5000 UNITS: 5000 INJECTION, SOLUTION INTRAVENOUS; SUBCUTANEOUS at 14:02

## 2017-07-10 RX ADMIN — MAGNESIUM GLUCONATE 500 MG ORAL TABLET 400 MG: 500 TABLET ORAL at 08:58

## 2017-07-10 RX ADMIN — TRAMADOL HYDROCHLORIDE 50 MG: 50 TABLET, FILM COATED ORAL at 11:16

## 2017-07-10 RX ADMIN — LEVOTHYROXINE SODIUM 50 MCG: 50 TABLET ORAL at 06:31

## 2017-07-10 RX ADMIN — HEPARIN SODIUM 5000 UNITS: 5000 INJECTION, SOLUTION INTRAVENOUS; SUBCUTANEOUS at 06:31

## 2017-07-10 RX ADMIN — TRAMADOL HYDROCHLORIDE 50 MG: 50 TABLET, FILM COATED ORAL at 20:58

## 2017-07-10 RX ADMIN — POTASSIUM CHLORIDE 10 MEQ: 750 TABLET, FILM COATED, EXTENDED RELEASE ORAL at 08:58

## 2017-07-10 RX ADMIN — TRAZODONE HYDROCHLORIDE 25 MG: 50 TABLET ORAL at 22:00

## 2017-07-10 RX ADMIN — BUPROPION HYDROCHLORIDE 150 MG: 150 TABLET, FILM COATED, EXTENDED RELEASE ORAL at 08:57

## 2017-07-10 RX ADMIN — VENLAFAXINE HYDROCHLORIDE 225 MG: 150 CAPSULE, EXTENDED RELEASE ORAL at 08:58

## 2017-07-10 RX ADMIN — POTASSIUM CHLORIDE 10 MEQ: 750 TABLET, FILM COATED, EXTENDED RELEASE ORAL at 17:23

## 2017-07-10 RX ADMIN — HEPARIN SODIUM 5000 UNITS: 5000 INJECTION, SOLUTION INTRAVENOUS; SUBCUTANEOUS at 20:58

## 2017-07-10 RX ADMIN — MAGNESIUM GLUCONATE 500 MG ORAL TABLET 400 MG: 500 TABLET ORAL at 17:23

## 2017-07-10 NOTE — PROGRESS NOTES
Daily Progress Note: 7/10/2017  Maria Fernanda Piper-Kaylan Mcclelland MD    Assessment/Plan:   NIMCO: likely with some degree of chronic: unclear etiology.  Had mild IVVD  --Cr improving slowly  --normal renal US  --nephrology followed     UTI -- culture shows gram neg rods - ESCHERICHIA FERGUSONII  --received rocephin inpt x 5 days - allergic to Pen and Sulfa according to allergy list - discussed with Pharm and will switch to po Omnicef     Back pain - superior endplate depression, mild L3  --pain control      HTN: watch    Depression/Paranoia/agitation/Dementia with behavioral disturbance likely worsened by UTI  --Psych has seen, recommend neuro consult and meds have been adjusted  --haldol prn agitation  --improved with tx of UTI    Hypothyroidism  -- cont supplement     History of PE: not on anticoagulation  -- DVT ppx on heparin SQ      Weakness / Fall: lives with grand-daughter   -- PT / Denice Villarreal, fall precautions         Problem List:  Problem List as of 7/10/2017  Date Reviewed: 7/2/2017          Codes Class Noted - Resolved    UTI (urinary tract infection) ICD-10-CM: N39.0  ICD-9-CM: 599.0  7/2/2017 - Present        Acute respiratory failure ([facility] ICD-10-CM: J96.00  ICD-9-CM: 518.81  12/21/2014 - Present        PE (pulmonary embolism) ICD-10-CM: I26.99  ICD-9-CM: 415.19  12/11/2014 - Present        NIMCO (acute kidney injury) ([facility] ICD-10-CM: N17.9  ICD-9-CM: 584.9  12/11/2014 - Present        Renal lesion ICD-10-CM: N28.9  ICD-9-CM: 593.9  11/5/2014 - Present        Autoimmune hemolytic anemia ([facility] ICD-10-CM: D59.1  ICD-9-CM: 283.0  11/5/2014 - Present        Leukocytosis ICD-10-CM: Z68.878  ICD-9-CM: 288.60  10/25/2014 - Present        HTN (hypertension) ICD-10-CM: I10  ICD-9-CM: 401.9  10/19/2014 - Present        Depression ICD-10-CM: F32.9  ICD-9-CM: 211  10/19/2014 - Present        Hypothyroidism ICD-10-CM: E03.9  ICD-9-CM: 244.9  10/19/2014 - Present        Diarrhea ICD-10-CM: R19.7  ICD-9-CM: 787.91 10/19/2014 - Present        Leukopenia ICD-10-CM: D72.819  ICD-9-CM: 288.50  10/19/2014 - Present        Anemia ICD-10-CM: D64.9  ICD-9-CM: 285.9  10/19/2014 - Present        Transaminitis ICD-10-CM: R74.0  ICD-9-CM: 790.4  10/19/2014 - Present        Hyperbilirubinemia ICD-10-CM: E80.6  ICD-9-CM: 782.4  10/19/2014 - Present        Weakness ICD-10-CM: R53.1  ICD-9-CM: 780.79  10/18/2014 - Present              Subjective:   H&P: Ms. Humza Blum is a 80 y.o. female w/ hx of HTN, hypothyroidism, depression, prior PE who presents with low back pain after fall. Per family at bedside, pt has been very weak in recent weeks, intermittent, moderate, worsening, associated with falling at home. Yesterday, pt was walking to bathroom and fell to the floor on her back. Now with severe back pain. No weakness or numbness. No fevers/chills, cough, CP, SOB. Reports nausea. ED workup showed severe NIMCO, leukocytosis. 7/2:  Patient c/o at least a week of nausea, not eating much. No abd. Pain, bowels have been OK. She denies dysuria. She has had chronic back pain, and now worse after her fall. This AM she still is with back pain but improved, she just ate some breakfast and feels like it will probably stay down. 7/3:  Nausea is some better. She wants to see ortho for her back pain. Creat at 3 yesterday. AM labs pending. Nurses report she is not letting them draw labs or start IV. Sees Neuro outpt for dementia. 7/4: Haldol last night around 10pm for agitation/paranoia -- she has been sleeping since. Didn't have her dinner last night. When I wake her up and introduce myself this morning she seems to recognize me, she does not remember her paranoid activities. She know she is in the hospital and admits to back pain. 7/5: Rested comfortably overnight. She has no complaints. She speaks rationally and not delusional at this time. She knows she is in the hospital but not which one.   It turns out she has not had dementia work up in past.  Altered mental status likely due to UTI and underlying dementia. Culture growing [address] which is sensitive to Rocephin. Creat about the same and will need monitoring. Hopefully home tomorrow with outpt work up of dementia but may start namenda at DC if Neuro agrees. :  Cont to improve. No complaints. No further delusional episodes. Memory cont to be poor at times but better at other times - this AM she knows she is in [facility]. Creat slowly improving but may have baseline CKD. For now, per neuro, hold dementia meds until renal function improves. Discussed with PREBEKAH [address] daughter and now they want her go to rehab as she was not functioning well in her \"in-law suite. \"      :  She reports no complaints. This AM she knows she is in hospital but not which one. No delusional or agitated behavior. Creat grad improving. Awaiting SNF for rehab. Will DC Rocephin and switch to po Omnicef. :  No complaints. Sitting up in bed eating breakfast.  Tolerating po Omnicef. Awaiting SNF for rehab.     :  No new problems or complaints. K+ and Mag a little low - replacing po for now. Creat grad improving.     7/10:  No new problems or complaints. K+ now ok and mag near normal.  Awaiting placement. Review of Systems:   Review of systems not obtained due to patient factors.     Allergies   Allergen Reactions    Percocet [Oxycodone-Acetaminophen] Anxiety     Anxiety, shaking     Pcn [Penicillins] Rash     Patient states this happened ~ 40 years ago  Tolerates ceftriaxone    Sulfa (Sulfonamide Antibiotics) Itching and Vertigo       Objective:   Physical Exam:     Visit Vitals    /71 (BP 1 Location: Right arm, BP Patient Position: At rest)    Pulse 82    Temp 98.1 °F (36.7 °C)    Resp 16    Ht 5' 3\" (1.6 m)    Wt 70.3 kg (155 lb)    SpO2 95%    Breastfeeding No    BMI 27.46 kg/m2      O2 Device: Room air    Temp (24hrs), Av.2 °F (36.8 °C), Min:98 °F (36.7 °C), Max:98.6 °F (37 °C)        07/08 0701 - 07/09 1900  In: 61 [P.O.:60]  Out: -     General:  Alert, cooperative, no distress, appears stated age. Head:  Normocephalic, without obvious abnormality, atraumatic. Eyes:  Conjunctivae/corneas clear. PERRL, EOMs intact. Neck: Supple, symmetrical, trachea midline   Lungs:   Clear to auscultation bilaterally. Heart:  Regular rate and rhythm, S1, S2 normal, no murmur, click, rub or gallop. Abdomen:   Soft, non-tender. Bowel sounds normal. No masses,  No organomegaly. Extremities: Extremities normal, atraumatic, no cyanosis or edema. No calf tenderness or cords. Pulses: 2+ and symmetric all extremities. Skin: Skin color, texture, turgor normal. No rashes or lesions   Neurologic: CNII-XII intact. Alert and oriented X 2 at this moment [person,palce] - waxes and wanes but improved. Data Review:       Recent Days:  Recent Labs      07/09/17   0429   WBC  8.8   HGB  10.7*   HCT  32.6*   PLT  202     Recent Labs      07/10/17   0202  07/09/17   0429   NA  137  140   K  3.6  3.2*   CL  105  106   CO2  21  23   GLU  145*  88   BUN  26*  25*   CREA  2.70*  2.56*   CA  8.4*  7.8*   MG  1.5*  1.3*   ALB  2.8*  2.8*   SGOT  11*  13*   ALT  12  15     No results for input(s): PH, PCO2, PO2, HCO3, FIO2 in the last 72 hours.     24 Hour Results:  Recent Results (from the past 24 hour(s))   METABOLIC PANEL, COMPREHENSIVE    Collection Time: 07/10/17  2:02 AM   Result Value Ref Range    Sodium 137 136 - 145 mmol/L    Potassium 3.6 3.5 - 5.1 mmol/L    Chloride 105 97 - 108 mmol/L    CO2 21 21 - 32 mmol/L    Anion gap 11 5 - 15 mmol/L    Glucose 145 (H) 65 - 100 mg/dL    BUN 26 (H) 6 - 20 MG/DL    Creatinine 2.70 (H) 0.55 - 1.02 MG/DL    BUN/Creatinine ratio 10 (L) 12 - 20      GFR est AA 20 (L) >60 ml/min/1.73m2    GFR est non-AA 17 (L) >60 ml/min/1.73m2    Calcium 8.4 (L) 8.5 - 10.1 MG/DL    Bilirubin, total 0.2 0.2 - 1.0 MG/DL    ALT (SGPT) 12 12 - 78 U/L    AST (SGOT) 11 (L) 15 - 37 U/L    Alk.  phosphatase 94 45 - 117 U/L    Protein, total 6.1 (L) 6.4 - 8.2 g/dL    Albumin 2.8 (L) 3.5 - 5.0 g/dL    Globulin 3.3 2.0 - 4.0 g/dL    A-G Ratio 0.8 (L) 1.1 - 2.2     MAGNESIUM    Collection Time: 07/10/17  2:02 AM   Result Value Ref Range    Magnesium 1.5 (L) 1.6 - 2.4 mg/dL       Medications reviewed  Current Facility-Administered Medications   Medication Dose Route Frequency    magnesium oxide (MAG-OX) tablet 400 mg  400 mg Oral BID    potassium chloride SR (KLOR-CON 10) tablet 10 mEq  10 mEq Oral BID    cefdinir (OMNICEF) capsule 300 mg  300 mg Oral DAILY    buPROPion XL (WELLBUTRIN XL) tablet 150 mg  150 mg Oral DAILY    traZODone (DESYREL) tablet 25 mg  25 mg Oral QHS    haloperidol lactate (HALDOL) injection 1 mg  1 mg IntraVENous Q6H PRN    haloperidol lactate (HALDOL) injection 1 mg  1 mg IntraMUSCular Q6H PRN    sodium chloride (NS) flush 5-10 mL  5-10 mL IntraVENous Q8H    sodium chloride (NS) flush 5-10 mL  5-10 mL IntraVENous PRN    naloxone (NARCAN) injection 0.4 mg  0.4 mg IntraVENous PRN    levothyroxine (SYNTHROID) tablet 50 mcg  50 mcg Oral ACB    venlafaxine-SR (EFFEXOR-XR) capsule 225 mg  225 mg Oral DAILY    traMADol (ULTRAM) tablet 50 mg  50 mg Oral Q6H PRN    heparin (porcine) injection 5,000 Units  5,000 Units SubCUTAneous Q8H    ondansetron (ZOFRAN) injection 4 mg  4 mg IntraVENous Q4H PRN       Neto Gimenez MD

## 2017-07-10 NOTE — PROGRESS NOTES
Bedside and Verbal shift change report given to [address] (oncoming nurse) by Lucila Pennington RN (offgoing nurse). Report included the following information SBAR, Kardex, MAR, Accordion and Recent Results.

## 2017-07-10 NOTE — PROGRESS NOTES
Problem: Self Care Deficits Care Plan (Adult)  Goal: *Acute Goals and Plan of Care (Insert Text)  Occupational Therapy Goals  Initiated 7/3/2017  1. Patient will perform grooming with supervision/set-up sitting EOB within 7 day(s). Met; upgrade to standing with CGA in 7 days  2. Patient will complete LE dressing with min A with AE within 7 days  3. Patient will don quick draw brace with CGA in 7 days  4. Patient will perform toilet transfers with moderate assistance to BCS within 7 day(s). Met; upgrade to min A in 7 days  5. Patient will perform all aspects of toileting with moderate assistance within 7 day(s). Met; upgrade to min A  6. Patient will participate in upper extremity therapeutic exercise/activities with supervision/set-up for 10 minutes within 7 day(s). Continue for consistency  7. Patient will participate in MoCA cognitive screening to maximize safe discharge planning within 7 days   OCCUPATIONAL THERAPY TREATMENT: WEEKLY REASSESSMENT  Patient: Reji Santos (80 y.o. female)  Date: 7/10/2017  Diagnosis: NIMCO (acute kidney injury) ([facility] <principal problem not specified>       Precautions: DNR, Fall  Chart, occupational therapy assessment, plan of care, and goals were reviewed. ASSESSMENT:  Patient OT weekly re-eval completed; patient has met or progressed in all STGs, Overall at mod A for functional mobility and LE ADLs and set up/min A UE ADLs. She has very poor STM and is unable to recall use of/purpose of quick draw back brace: Staff or family will need to direct this use as she is unable to do so. Recommend MoCA next tx session to clarify strengths and weaknesses in cognition, as [facility] which would not currently be a safe discharge plan at today's demonstrated functional cognitive level. Note plan for SNF level rehab which should be very helpful for her.    Progression toward goals:  [X]          Improving appropriately and progressing toward goals  [ ]          Improving slowly and progressing toward goals  [ ]          Not making progress toward goals and plan of care will be adjusted       PLAN:  Goals have been updated based on progression since last assessment. Patient continues to benefit from skilled intervention to address the above impairments. Continue to follow patient 5 times a week to address goals. Planned Interventions:  [X]                  Self Care Training                  [X]           Therapeutic Activities  [X]                  Functional Mobility Training    [X]           Cognitive Retraining  [X]                  Therapeutic Exercises           [X]           Endurance Activities  [X]                  Balance Training                   [X]           Neuromuscular Re-Education  [ ]                  Visual/Perceptual Training     [X]      Home Safety Training  [X]                  Patient Education                 [X]           Family Training/Education  [ ]                  Other (comment):  Discharge Recommendations: Skilled Nursing Facility  Further Equipment Recommendations for Discharge: TBA       SUBJECTIVE:   Patient stated Oh I don't need that.  (re brace) \"Oh that feels good with it on. \"       OBJECTIVE DATA SUMMARY:   Cognitive/Behavioral Status:  Neurologic State: Alert  Orientation Level: Oriented to person;Disoriented to time;Oriented to place  Cognition: Follows commands; Impaired decision making; Impulsive;Memory loss;Poor safety awareness  Perception: Appears intact  Perseveration: Perseverates during conversation  Safety/Judgement: Fall prevention;Home safety  Functional Mobility and Transfers for ADLs:              Bed Mobility:     Supine to Sit: Minimum assistance  Sit to Supine: Minimum assistance        Transfers:  Sit to Stand: Moderate assistance; Additional time (max cues for precautions)              Functional Transfers  Bathroom Mobility: Moderate assistance;Minimum assistance     Balance:  Sitting: Impaired  Sitting - Static: Good (unsupported)  Sitting - Dynamic: Fair (occasional) (tactile cues to keep precautions)  Standing: Impaired  Standing - Static: Fair;Constant support  Standing - Dynamic : Fair  ADL Intervention:  Feeding  Feeding Assistance: Supervision/set-up     Grooming  Grooming Assistance: Supervision/set up     Upper Body Bathing  Bathing Assistance: Minimum assistance (to keep precautions)     Lower Body Bathing  Bathing Assistance: Moderate assistance (needs LHS)     Upper Body Dressing Assistance  Dressing Assistance: Minimum assistance (very difficult to keep precautions)     Lower Body Dressing Assistance  Dressing Assistance: Moderate assistance (very distracted with decreased ability to follow instruction)     [address]: Moderate assistance  Bladder Hygiene:  (poor ability to maintain precautions)  Bowel Hygiene:  (poor ability to maintain precautions)  Clothing Management: Minimum assistance     Cognitive Retraining  Attention to Task: Distractibility; Single task  Maintains Attention For (Time): 1 minute  Safety/Judgement: Fall prevention;Home safety           Pain:  Pain Scale 1: Numeric (0 - 10)  Pain Intensity 1: 3  Pain Location 1: Back              Activity Tolerance:    Good, willing to remain in chair post tx  Please refer to the flowsheet for vital signs taken during this treatment.   After treatment:   [X] Patient left in no apparent distress sitting up in chair  [ ] Patient left in no apparent distress in bed  [X] Call bell left within reach  [ ] Nursing notified  [X] Caregiver present  [X] Bed alarm activated      COMMUNICATION/COLLABORATION:   The patients plan of care was discussed with: Physical Therapy Assistant and Registered Nurse     Lu Hamilton OTR/L  Time Calculation: 45 mins

## 2017-07-10 NOTE — PROGRESS NOTES
Problem: Patient Education: Go to Patient Education Activity  Goal: Patient/Family Education  PHYSICAL THERAPY TREATMENT  Patient: Kimberly Gates Self-Umlang [de-identified]80 y.o. female)  Date: 7/10/2017  Diagnosis: NIMCO (acute kidney injury) ([facility] <principal problem not specified>       Precautions: DNR, Fall  Chart, physical therapy assessment, plan of care and goals were reviewed. ASSESSMENT:  Pt comes to sit with min assist.Pt to stand with mod assist.Pt donned back brace with max assist.Pt ambulated 40 with RW min assist of 1. Pts legs scissored x 2 during ambulation. Pt back to bed with min assist.Pt progressing slowly but is at high risk for falls. Continue goals. Progression toward goals:  [ ]      Improving appropriately and progressing toward goals  [X]      Improving slowly and progressing toward goals  [ ]      Not making progress toward goals and plan of care will be adjusted       PLAN:  Patient continues to benefit from skilled intervention to address the above impairments. Continue treatment per established plan of care. Discharge Recommendations:  [facility]  Further Equipment Recommendations for Discharge:  rolling walker       SUBJECTIVE:          OBJECTIVE DATA SUMMARY:   Critical Behavior:  Neurologic State: Alert, Appropriate for age, Eyes open spontaneously  Orientation Level: Disoriented to time  Cognition: Follows commands  Safety/Judgement: Decreased insight into deficits, Decreased awareness of need for safety  Functional Mobility Training:  Bed Mobility:     Supine to Sit: Minimum assistance  Sit to Supine: Minimum assistance                       Transfers:  Sit to Stand:  Moderate assistance                                Balance:  Sitting: Intact  Standing: With support  Standing - Static: Fair;Good  Ambulation/Gait Training:  Distance (ft): 40 Feet (ft)  Assistive Device: Walker, rolling;Gait belt  Ambulation - Level of Assistance: Minimal assistance        Gait Abnormalities: Antalgic; Path deviations        Base of Support: Narrowed        Step Length: Left shortened;Right shortened                               Pain:  Pain Scale 1: Numeric (0 - 10)  Pain Intensity 1: 3  Pain Location 1: Back           Activity Tolerance:   Pt tolerated treatment well. Please refer to the flowsheet for vital signs taken during this treatment.   After treatment:   [ ] Patient left in no apparent distress sitting up in chair  [X] Patient left in no apparent distress in bed  [ ] Call bell left within reach  [ ] Nursing notified  [ ] Caregiver present  [ ] Bed alarm activated      COMMUNICATION/COLLABORATION:   The patients plan of care was discussed with: Physical Therapy Assistant     Vanessa Mcqueen PTA   Time Calculation: 23 mins

## 2017-07-10 NOTE — PROGRESS NOTES
Bedside and Verbal shift change report given to Rod Woodruff RN (oncoming nurse) by Belkis Cadet RN (offgoing nurse). Report included the following information SBAR, Kardex, Intake/Output, MAR and Recent Results.

## 2017-07-10 NOTE — PROGRESS NOTES
Pt reports no pain at rest.  Pt reports back pain with movement in bed or activity. States that back pain improved when OOB and pt has recently returned to bed from recliner. Pt was medicated earlier with Tramadol. Called, informed Dr. Samy Monk of the above. New order received for K-pad/heating pad for pt's back.

## 2017-07-10 NOTE — PROGRESS NOTES
7/10/2017   CARE MANAGEMENT NOTE:  CM reviewed EMR for clinical updates. CM is exploring SNF for short term rehab. Referrals were sent to three facilities of choice - [facility] does not have any female beds. Await responses from [facility] and [facility].    Richard

## 2017-07-10 NOTE — PROGRESS NOTES
[address] [place] [address]  YOB: 1932          Assessment & Plan:   1. NIMCO  - UA: Mild protein,   - US: no hydro  - Cr cont to improve with Volume but now stuck  - No RRT needed  - Etiology unclear: but no biopsy yet  - serologies are unremarkable so far:Neg Hep C, normal complements, normal FLC ration, No M spike  - US: No hydro     2. HTN  - Not on meds       Subjective:   CC:arf  HPI: Patient seen   NIMCO is better but now stuck  No cp/sob       Current Facility-Administered Medications   Medication Dose Route Frequency    magnesium oxide (MAG-OX) tablet 400 mg  400 mg Oral BID    potassium chloride SR (KLOR-CON 10) tablet 10 mEq  10 mEq Oral BID    buPROPion XL (WELLBUTRIN XL) tablet 150 mg  150 mg Oral DAILY    traZODone (DESYREL) tablet 25 mg  25 mg Oral QHS    haloperidol lactate (HALDOL) injection 1 mg  1 mg IntraVENous Q6H PRN    haloperidol lactate (HALDOL) injection 1 mg  1 mg IntraMUSCular Q6H PRN    sodium chloride (NS) flush 5-10 mL  5-10 mL IntraVENous Q8H    sodium chloride (NS) flush 5-10 mL  5-10 mL IntraVENous PRN    naloxone (NARCAN) injection 0.4 mg  0.4 mg IntraVENous PRN    levothyroxine (SYNTHROID) tablet 50 mcg  50 mcg Oral ACB    venlafaxine-SR (EFFEXOR-XR) capsule 225 mg  225 mg Oral DAILY    traMADol (ULTRAM) tablet 50 mg  50 mg Oral Q6H PRN    heparin (porcine) injection 5,000 Units  5,000 Units SubCUTAneous Q8H    ondansetron (ZOFRAN) injection 4 mg  4 mg IntraVENous Q4H PRN          Objective:     Vitals:  Blood pressure 131/74, pulse 78, temperature 98.5 °F (36.9 °C), resp. rate 16, height 5' 3\" (1.6 m), weight 70.3 kg (155 lb), SpO2 96 %, not currently breastfeeding.   Temp (24hrs), Av.2 °F (36.8 °C), Min:98 °F (36.7 °C), Max:98.5 °F (36.9 °C)      Intake and Output:          Physical Exam:                Patient is intubated:  no    Physical Examination:   GENERAL ASSESSMENT: NAD  HEENT:Nontraumatic   CHEST: CTA  HEART: S1S2  ABDOMEN: Soft,NT,  :[place]: n  EXTREMITY: EDEMA  NEURO:Grossly non focal          ECG/rhythm:    Data Review      No results for input(s): TNIPOC in the last 72 hours. No lab exists for component: ITNL   No results for input(s): CPK, CKMB, TROIQ in the last 72 hours. Recent Labs      07/10/17   0202  07/09/17   0429   NA  137  140   K  3.6  3.2*   CL  105  106   CO2  21  23   BUN  26*  25*   CREA  2.70*  2.56*   GLU  145*  88   MG  1.5*  1.3*   CA  8.4*  7.8*   ALB  2.8*  2.8*   WBC   --   8.8   HGB   --   10.7*   HCT   --   32.6*   PLT   --   202      No results for input(s): INR, PTP, APTT in the last 72 hours. No lab exists for component: INREXT, INREXT  Needs: urine analysis, urine sodium, protein and creatinine  Lab Results   Component Value Date/Time    Sodium urine, random 55 07/02/2017 02:41 AM    Creatinine, urine 187.78 12/21/2014 03:50 PM         Discussed with:  Family, Colleague    : Eloisa Minor MD  7/10/2017        [facility]:  [email or web address]  Ana Luisa Bard office:  [address] [place] [address],[address]  [place], [id number] [place] [address]  Phone: [phone]  Fax :     [phone]    [facility] office:  [address] [facility]  Phone - [phone]  Fax - [phone]

## 2017-07-11 VITALS
DIASTOLIC BLOOD PRESSURE: 73 MMHG | OXYGEN SATURATION: 95 % | RESPIRATION RATE: 18 BRPM | HEART RATE: 75 BPM | TEMPERATURE: 98.9 F | WEIGHT: 155 LBS | HEIGHT: 63 IN | BODY MASS INDEX: 27.46 KG/M2 | SYSTOLIC BLOOD PRESSURE: 158 MMHG

## 2017-07-11 PROCEDURE — 97116 GAIT TRAINING THERAPY: CPT

## 2017-07-11 PROCEDURE — 74011250637 HC RX REV CODE- 250/637: Performed by: PSYCHIATRY & NEUROLOGY

## 2017-07-11 PROCEDURE — 97530 THERAPEUTIC ACTIVITIES: CPT

## 2017-07-11 PROCEDURE — 74011250637 HC RX REV CODE- 250/637: Performed by: FAMILY MEDICINE

## 2017-07-11 PROCEDURE — 74011250637 HC RX REV CODE- 250/637: Performed by: INTERNAL MEDICINE

## 2017-07-11 PROCEDURE — 74011250636 HC RX REV CODE- 250/636: Performed by: INTERNAL MEDICINE

## 2017-07-11 RX ORDER — LANOLIN ALCOHOL/MO/W.PET/CERES
400 CREAM (GRAM) TOPICAL 2 TIMES DAILY
Qty: 60 TAB | Refills: 0 | Status: SHIPPED
Start: 2017-07-11 | End: 2017-07-17

## 2017-07-11 RX ORDER — POTASSIUM CHLORIDE 750 MG/1
10 TABLET, FILM COATED, EXTENDED RELEASE ORAL 2 TIMES DAILY
Qty: 60 TAB | Refills: 0 | Status: SHIPPED
Start: 2017-07-11 | End: 2017-07-17

## 2017-07-11 RX ORDER — TRAZODONE HYDROCHLORIDE 50 MG/1
25 TABLET ORAL
Qty: 30 TAB | Refills: 0 | Status: SHIPPED
Start: 2017-07-11 | End: 2018-01-12

## 2017-07-11 RX ADMIN — MAGNESIUM GLUCONATE 500 MG ORAL TABLET 400 MG: 500 TABLET ORAL at 08:46

## 2017-07-11 RX ADMIN — VENLAFAXINE HYDROCHLORIDE 225 MG: 150 CAPSULE, EXTENDED RELEASE ORAL at 08:46

## 2017-07-11 RX ADMIN — POTASSIUM CHLORIDE 10 MEQ: 750 TABLET, FILM COATED, EXTENDED RELEASE ORAL at 08:46

## 2017-07-11 RX ADMIN — LEVOTHYROXINE SODIUM 50 MCG: 50 TABLET ORAL at 06:21

## 2017-07-11 RX ADMIN — HEPARIN SODIUM 5000 UNITS: 5000 INJECTION, SOLUTION INTRAVENOUS; SUBCUTANEOUS at 13:20

## 2017-07-11 RX ADMIN — HEPARIN SODIUM 5000 UNITS: 5000 INJECTION, SOLUTION INTRAVENOUS; SUBCUTANEOUS at 06:20

## 2017-07-11 RX ADMIN — BUPROPION HYDROCHLORIDE 150 MG: 150 TABLET, FILM COATED, EXTENDED RELEASE ORAL at 08:46

## 2017-07-11 NOTE — PROGRESS NOTES
Daily Progress Note and Discharge Note: 7/11/2017  Maria Fernanda Piper-Kaylan Mcmillan MD    Assessment/Plan:   NIMCO: likely with some degree of chronic: unclear etiology.  Had mild IVVD  --Cr improving/stable  --normal renal US  --nephrology followed     UTI -- culture shows gram neg rods - ESCHERICHIA FERGUSONII  --received rocephin inpt x 5 days - allergic to Pen and Sulfa according to allergy list - discussed with Pharm, switched to po Omnicef - now complete     Back pain - superior endplate depression, mild L3  --pain control      HTN: watch    Depression/Paranoia/agitation/Dementia with behavioral disturbance likely worsened by UTI  --Psych has seen, recommend neuro consult and meds have been adjusted  --haldol prn agitation  --improved with tx of UTI    Hypothyroidism  -- cont supplement     History of PE: not on anticoagulatin - monitor     Weakness / Fall:   -- PT / OT, fall precautions  -- to rehab         Problem List:  Problem List as of 7/11/2017  Date Reviewed: 7/2/2017          Codes Class Noted - Resolved    UTI (urinary tract infection) ICD-10-CM: N39.0  ICD-9-CM: 599.0  7/2/2017 - Present        Acute respiratory failure ([facility] 75.) ICD-10-CM: J96.00  ICD-9-CM: 518.81  12/21/2014 - Present        PE (pulmonary embolism) ICD-10-CM: I26.99  ICD-9-CM: 415.19  12/11/2014 - Present        NIMCO (acute kidney injury) ([facility] 75.) ICD-10-CM: N17.9  ICD-9-CM: 584.9  12/11/2014 - Present        Renal lesion ICD-10-CM: N28.9  ICD-9-CM: 593.9  11/5/2014 - Present        Autoimmune hemolytic anemia ([facility] ICD-10-CM: D59.1  ICD-9-CM: 283.0  11/5/2014 - Present        Leukocytosis ICD-10-CM: M26.697  ICD-9-CM: 288.60  10/25/2014 - Present        HTN (hypertension) ICD-10-CM: I10  ICD-9-CM: 401.9  10/19/2014 - Present        Depression ICD-10-CM: F32.9  ICD-9-CM: 405  10/19/2014 - Present        Hypothyroidism ICD-10-CM: E03.9  ICD-9-CM: 244.9  10/19/2014 - Present        Diarrhea ICD-10-CM: R19.7  ICD-9-CM: 787.91 10/19/2014 - Present        Leukopenia ICD-10-CM: D72.819  ICD-9-CM: 288.50  10/19/2014 - Present        Anemia ICD-10-CM: D64.9  ICD-9-CM: 285.9  10/19/2014 - Present        Transaminitis ICD-10-CM: R74.0  ICD-9-CM: 790.4  10/19/2014 - Present        Hyperbilirubinemia ICD-10-CM: E80.6  ICD-9-CM: 782.4  10/19/2014 - Present        Weakness ICD-10-CM: R53.1  ICD-9-CM: 780.79  10/18/2014 - Present              Subjective:   H&P: Ms. Mary Simon is a 80 y.o. female w/ hx of HTN, hypothyroidism, depression, prior PE who presents with low back pain after fall. Per family at bedside, pt has been very weak in recent weeks, intermittent, moderate, worsening, associated with falling at home. Yesterday, pt was walking to bathroom and fell to the floor on her back. Now with severe back pain. No weakness or numbness. No fevers/chills, cough, CP, SOB. Reports nausea. ED workup showed severe NIMCO, leukocytosis. 7/2:  Patient c/o at least a week of nausea, not eating much. No abd. Pain, bowels have been OK. She denies dysuria. She has had chronic back pain, and now worse after her fall. This AM she still is with back pain but improved, she just ate some breakfast and feels like it will probably stay down. 7/3:  Nausea is some better. She wants to see ortho for her back pain. Creat at 3 yesterday. AM labs pending. Nurses report she is not letting them draw labs or start IV. Sees Neuro outpt for dementia. 7/4: Haldol last night around 10pm for agitation/paranoia -- she has been sleeping since. Didn't have her dinner last night. When I wake her up and introduce myself this morning she seems to recognize me, she does not remember her paranoid activities. She know she is in the hospital and admits to back pain. 7/5: Rested comfortably overnight. She has no complaints. She speaks rationally and not delusional at this time. She knows she is in the hospital but not which one.   It turns out she has not had dementia work up in past.  Altered mental status likely due to UTI and underlying dementia. Culture growing [address] which is sensitive to Rocephin. Creat about the same and will need monitoring. Hopefully home tomorrow with outpt work up of dementia but may start namenda at DC if Neuro agrees. 7/6:  Cont to improve. No complaints. No further delusional episodes. Memory cont to be poor at times but better at other times - this AM she knows she is in [id number] Hummel slowly improving but may have baseline CKD. For now, per neuro, hold dementia meds until renal function improves. Discussed with [address] daughter and now they want her go to rehab as she was not functioning well in her \"in-law suite. \"      7/7:  She reports no complaints. This AM she knows she is in hospital but not which one. No delusional or agitated behavior. Creat grad improving. Awaiting SNF for rehab. Will DC Rocephin and switch to po Omnicef. 7/8:  No complaints. Sitting up in bed eating breakfast.  Tolerating po Omnicef. Awaiting SNF for rehab.     7/9:  No new problems or complaints. K+ and Mag a little low - replacing po for now. Creat grad improving.     7/10:  No new problems or complaints. K+ now ok and mag near normal.  Awaiting placement. 7/11:  Little change. Awaiting placement. 12:25PM:  Accepted a [facility] - stable for rehab. Review of Systems:   Review of systems not obtained due to patient factors.     Allergies   Allergen Reactions    Percocet [Oxycodone-Acetaminophen] Anxiety     Anxiety, shaking     Pcn [Penicillins] Rash     Patient states this happened ~ 40 years ago  Tolerates ceftriaxone    Sulfa (Sulfonamide Antibiotics) Itching and Vertigo       Objective:   Physical Exam:     Visit Vitals    /68 (BP 1 Location: Right arm, BP Patient Position: At rest)    Pulse 81    Temp 98.8 °F (37.1 °C)    Resp 17    Ht 5' 3\" (1.6 m)    Wt 70.3 kg (155 lb)    SpO2 95%    Breastfeeding No    BMI 27.46 kg/m2      O2 Device: Room air    Temp (24hrs), Av.4 °F (36.9 °C), Min:98.1 °F (36.7 °C), Max:98.8 °F (37.1 °C)             General:  Alert, cooperative, no distress, appears stated age. Head:  Normocephalic, without obvious abnormality, atraumatic. Eyes:  Conjunctivae/corneas clear. PERRL, EOMs intact. Neck: Supple, symmetrical, trachea midline   Lungs:   Clear to auscultation bilaterally. Heart:  Regular rate and rhythm, S1, S2 normal, no murmur, click, rub or gallop. Abdomen:   Soft, non-tender. Bowel sounds normal. No masses,  No organomegaly. Extremities: Extremities normal, atraumatic, no cyanosis or edema. No calf tenderness or cords. Pulses: 2+ and symmetric all extremities. Skin: Skin color, texture, turgor normal. No rashes or lesions   Neurologic: CNII-XII intact. Alert and oriented X 2 at this moment [person,palce] - waxes and wanes but improved. Data Review:       Recent Days:  Recent Labs      17   0429   WBC  8.8   HGB  10.7*   HCT  32.6*   PLT  202     Recent Labs      07/10/17   0202  17   0429   NA  137  140   K  3.6  3.2*   CL  105  106   CO2  21  23   GLU  145*  88   BUN  26*  25*   CREA  2.70*  2.56*   CA  8.4*  7.8*   MG  1.5*  1.3*   ALB  2.8*  2.8*   SGOT  11*  13*   ALT  12  15     No results for input(s): PH, PCO2, PO2, HCO3, FIO2 in the last 72 hours. 24 Hour Results:  No results found for this or any previous visit (from the past 24 hour(s)).     Medications reviewed  Current Facility-Administered Medications   Medication Dose Route Frequency    magnesium oxide (MAG-OX) tablet 400 mg  400 mg Oral BID    potassium chloride SR (KLOR-CON 10) tablet 10 mEq  10 mEq Oral BID    buPROPion XL (WELLBUTRIN XL) tablet 150 mg  150 mg Oral DAILY    traZODone (DESYREL) tablet 25 mg  25 mg Oral QHS    haloperidol lactate (HALDOL) injection 1 mg  1 mg IntraVENous Q6H PRN    haloperidol lactate (HALDOL) injection 1 mg  1 mg IntraMUSCular Q6H PRN    sodium chloride (NS) flush 5-10 mL  5-10 mL IntraVENous PRN    naloxone (NARCAN) injection 0.4 mg  0.4 mg IntraVENous PRN    levothyroxine (SYNTHROID) tablet 50 mcg  50 mcg Oral ACB    venlafaxine-SR (EFFEXOR-XR) capsule 225 mg  225 mg Oral DAILY    traMADol (ULTRAM) tablet 50 mg  50 mg Oral Q6H PRN    heparin (porcine) injection 5,000 Units  5,000 Units SubCUTAneous Q8H    ondansetron (ZOFRAN) injection 4 mg  4 mg IntraVENous Q4H PRN       Jeison Anderson MD

## 2017-07-11 NOTE — PROGRESS NOTES
Spoke with Jay Jay Quintanilla on the floor and the patient needs transport by ambulance. Patient has been discharged by the physician. [facility] and spoke with Grady , Reji Arzate and they can take the patient today. Sent the AVS via [employer]. Set up the patient with [facility] for 2:30 PM today. Nurses should call report to [phone] to [facility]. Spoke with the Daughter in [place] who is also MPOA and wife of Daria Alvarado who is sick and made her aware of the arrangements and she was in agreement with them. Gave her the address, phone number and contacts at [facility] also. / Mian Ayon Manager of Case Management

## 2017-07-11 NOTE — PROGRESS NOTES
Patient unable to sign discharge instructions. Report given via phone to [address] at [facility]. Copy of discharge paperwork sent with the patient for the family to have a copy. Patient prepared for 2:30  by ambulance.

## 2017-07-11 NOTE — DISCHARGE SUMMARY
Physician Discharge Summary     Patient ID:    Cassius Perez  [id number]  80 y.o.  11/2/1932  Jose Goins MD    Admit date: 7/2/2017    Discharge date and time: 7/11/2017    Admission Diagnoses: NIMCO (acute kidney injury) (HCC)/AMS/UTI    Chronic Diagnoses:    Problem List as of 7/11/2017  Date Reviewed: 7/2/2017          Codes Class Noted - Resolved    UTI (urinary tract infection) ICD-10-CM: N39.0  ICD-9-CM: 599.0  7/2/2017 - Present        Acute respiratory failure ([facility].) ICD-10-CM: J96.00  ICD-9-CM: 518.81  12/21/2014 - Present        PE (pulmonary embolism) ICD-10-CM: I26.99  ICD-9-CM: 415.19  12/11/2014 - Present        NIMCO (acute kidney injury) ([facility] 75.) ICD-10-CM: N17.9  ICD-9-CM: 584.9  12/11/2014 - Present        Renal lesion ICD-10-CM: N28.9  ICD-9-CM: 593.9  11/5/2014 - Present        Autoimmune hemolytic anemia ([facility].) ICD-10-CM: D59.1  ICD-9-CM: 283.0  11/5/2014 - Present        Leukocytosis ICD-10-CM: D72.829  ICD-9-CM: 288.60  10/25/2014 - Present        HTN (hypertension) ICD-10-CM: I10  ICD-9-CM: 401.9  10/19/2014 - Present        Depression ICD-10-CM: F32.9  ICD-9-CM: 133  10/19/2014 - Present        Hypothyroidism ICD-10-CM: E03.9  ICD-9-CM: 244.9  10/19/2014 - Present        Diarrhea ICD-10-CM: R19.7  ICD-9-CM: 787.91  10/19/2014 - Present        Leukopenia ICD-10-CM: D72.819  ICD-9-CM: 288.50  10/19/2014 - Present        Anemia ICD-10-CM: D64.9  ICD-9-CM: 285.9  10/19/2014 - Present        Transaminitis ICD-10-CM: R74.0  ICD-9-CM: 790.4  10/19/2014 - Present        Hyperbilirubinemia ICD-10-CM: E80.6  ICD-9-CM: 782.4  10/19/2014 - Present        Weakness ICD-10-CM: R53.1  ICD-9-CM: 780.79  10/18/2014 - Present              Discharge Medications:   Current Discharge Medication List      START taking these medications    Details   magnesium oxide (MAG-OX) 400 mg tablet Take 1 Tab by mouth two (2) times a day.   Qty: 60 Tab, Refills: 0      potassium chloride SR (KLOR-CON 10) 10 mEq tablet Take 1 Tab by mouth two (2) times a day. Qty: 60 Tab, Refills: 0      traZODone (DESYREL) 50 mg tablet Take 0.5 Tabs by mouth nightly. Qty: 30 Tab, Refills: 0         CONTINUE these medications which have NOT CHANGED    Details   gabapentin (NEURONTIN) 100 mg capsule Take 100 mg by mouth three (3) times daily. acetaminophen-codeine (TYLENOL #2) 300-15 mg per tablet Take 1 Tab by mouth every four (4) hours as needed for Pain. buPROPion XL (WELLBUTRIN XL) 300 mg XL tablet Take 300 mg by mouth every morning. !! venlafaxine-SR (EFFEXOR-XR) 75 mg capsule Take 75 mg by mouth daily. In addition to 150 mg daily. Total 225 mg daily. !! venlafaxine-SR (EFFEXOR XR) 150 mg capsule Take 150 mg by mouth daily. In addition to 75 mg daily. Total 225 mg daily. levothyroxine (LEVOTHROID) 50 mcg tablet Take 50 mcg by mouth Daily (before breakfast). albuterol (PROAIR HFA) 90 mcg/actuation inhaler Take 2 puffs by inhalation every four (4) hours as needed. CARBAMIDE PEROXIDE (EAR WAX DROPS OT) 1 drop by Otic route daily as needed. !! - Potential duplicate medications found. Please discuss with provider. Follow up Care:    1. Nelia Ulrich MD with in 1 weeks  2.  specialists as directed. Diet:  Cardiac Diet and Diabetic Diet    Disposition:  Rehab.     Advanced Directive:    Discharge Exam:  [See today's progress note.]    CONSULTATIONS: Nephrology    Significant Diagnostic Studies:   Recent Labs      07/09/17 0429   WBC  8.8   HGB  10.7*   HCT  32.6*   PLT  202     Recent Labs      07/10/17   0202  07/09/17 0429   NA  137  140   K  3.6  3.2*   CL  105  106   CO2  21  23   BUN  26*  25*   CREA  2.70*  2.56*   GLU  145*  88   CA  8.4*  7.8*   MG  1.5*  1.3*     Recent Labs      07/10/17   0202  07/09/17 0429   SGOT  11*  13*   ALT  12  15   AP  94  89   TBILI  0.2  0.2   TP  6.1*  6.0*   ALB  2.8*  2.8*   GLOB  3.3  3.2     Lab Results   Component Value Date/Time    TSH 1.62 07/03/2017 10:35 AM             HOSPITAL COURSE & TREATMENT RENDERED:   1. See today's progress note:    Daily Progress Note and Discharge Note: 7/11/2017  Maria Fernanda Piper-Cherelle  Angelia Mcclelland MD         Assessment/Plan:   NIMCO: likely with some degree of chronic: unclear etiology. Had mild IVVD  --Cr improving/stable  --normal renal US  --nephrology followed      UTI -- culture shows gram neg rods - ESCHERICHIA FERGUSONII  --received rocephin inpt x 5 days - allergic to Pen and Sulfa according to allergy list - discussed with Pharm, switched to po Omnicef - now complete      Back pain - superior endplate depression, mild L3  --pain control       HTN: watch     Depression/Paranoia/agitation/Dementia with behavioral disturbance likely worsened by UTI  --Psych has seen, recommend neuro consult and meds have been adjusted  --haldol prn agitation  --improved with tx of UTI     Hypothyroidism  -- cont supplement      History of PE: not on anticoagulatin - monitor      Weakness / Fall:   -- PT / OT, fall precautions  -- to rehab         Problem List:  Problem List as of 7/11/2017  Date Reviewed: 7/2/2017             Codes Class Noted - Resolved     UTI (urinary tract infection) ICD-10-CM: N39.0  ICD-9-CM: 599.0   7/2/2017 - Present           Acute respiratory failure ([facility] ICD-10-CM: J96.00  ICD-9-CM: 518.81   12/21/2014 - Present           PE (pulmonary embolism) ICD-10-CM: I26.99  ICD-9-CM: 415.19   12/11/2014 - Present           NIMCO (acute kidney injury) ([facility].) ICD-10-CM: N17.9  ICD-9-CM: 654. 9   12/11/2014 - Present           Renal lesion ICD-10-CM: N28.9  ICD-9-CM: 593.9   11/5/2014 - Present           Autoimmune hemolytic anemia (HCC) ICD-10-CM: D59.1  ICD-9-CM: 283.0   11/5/2014 - Present           Leukocytosis ICD-10-CM: K81.885  ICD-9-CM: 288.60   10/25/2014 - Present           HTN (hypertension) ICD-10-CM: I10  ICD-9-CM: 752. 9   10/19/2014 - Present           Depression ICD-10-CM: F32.9  ICD-9-CM: 666   10/19/2014 - Present           Hypothyroidism ICD-10-CM: E03.9  ICD-9-CM: 244. 9   10/19/2014 - Present           Diarrhea ICD-10-CM: R19.7  ICD-9-CM: 787.91   10/19/2014 - Present           Leukopenia ICD-10-CM: X66.859  ICD-9-CM: 288.50   10/19/2014 - Present           Anemia ICD-10-CM: D64.9  ICD-9-CM: 478. 9   10/19/2014 - Present           Transaminitis ICD-10-CM: R74.0  ICD-9-CM: 790.4   10/19/2014 - Present           Hyperbilirubinemia ICD-10-CM: E80.6  ICD-9-CM: 908. 4   10/19/2014 - Present           Weakness ICD-10-CM: R53.1  ICD-9-CM: 780.79   10/18/2014 - Present                  Subjective:   H&P: Ms. Bradley is a 80 y. o. female w/ hx of HTN, hypothyroidism, depression, prior PE who presents with low back pain after fall. Per family at bedside, pt has been very weak in recent weeks, intermittent, moderate, worsening, associated with falling at home. Yesterday, pt was walking to bathroom and fell to the floor on her back. Now with severe back pain. No weakness or numbness. No fevers/chills, cough, CP, SOB. Reports nausea. ED workup showed severe NIMCO, leukocytosis.      7/2:  Patient c/o at least a week of nausea, not eating much. No abd. Pain, bowels have been OK. She denies dysuria. She has had chronic back pain, and now worse after her fall. This AM she still is with back pain but improved, she just ate some breakfast and feels like it will probably stay down.     7/3:  Nausea is some better. She wants to see ortho for her back pain. Creat at 3 yesterday. AM labs pending. Nurses report she is not letting them draw labs or start IV. Sees Neuro outpt for dementia.      7/4: Haldol last night around 10pm for agitation/paranoia -- she has been sleeping since. Didn't have her dinner last night. When I wake her up and introduce myself this morning she seems to recognize me, she does not remember her paranoid activities.   She know she is in the hospital and admits to back pain.     7/5: Rested comfortably overnight. She has no complaints. She speaks rationally and not delusional at this time. She knows she is in the hospital but not which one. It turns out she has not had dementia work up in past.  Altered mental status likely due to UTI and underlying dementia. Culture growing [address] which is sensitive to Rocephin. Creat about the same and will need monitoring. Hopefully home tomorrow with outpt work up of dementia but may start namenda at DC if Neuro agrees.     7/6:  Cont to improve. No complaints. No further delusional episodes. Memory cont to be poor at times but better at other times - this AM she knows she is in [facility]. Creat slowly improving but may have baseline CKD. For now, per neuro, hold dementia meds until renal function improves. Discussed with [address] daughter and now they want her go to rehab as she was not functioning well in her \"in-law suite. \"       7/7:  She reports no complaints. This AM she knows she is in hospital but not which one. No delusional or agitated behavior. Creat grad improving. Awaiting SNF for rehab. Will DC Rocephin and switch to po Omnicef.           7/8:  No complaints. Sitting up in bed eating breakfast.  Tolerating po Omnicef. Awaiting SNF for rehab.      7/9:  No new problems or complaints. K+ and Mag a little low - replacing po for now. Creat grad improving.      7/10:  No new problems or complaints. K+ now ok and mag near normal.  Awaiting placement.      7/11:  Little change. Awaiting placement.    12:25PM:  Accepted a [facility] - stable for rehab.     Review of Systems:   Review of systems not obtained due to patient factors.           Allergies   Allergen Reactions    Percocet [Oxycodone-Acetaminophen] Anxiety       Anxiety, shaking     Pcn [Penicillins] Rash       Patient states this happened ~ 40 years ago  Tolerates ceftriaxone    Sulfa (Sulfonamide Antibiotics) Itching and Vertigo         Objective:   Physical Exam:           Visit Vitals    /68 (BP 1 Location: Right arm, BP Patient Position: At rest)    Pulse 81    Temp 98.8 °F (37.1 °C)    Resp 17    Ht 5' 3\" (1.6 m)    Wt 70.3 kg (155 lb)    SpO2 95%    Breastfeeding No    BMI 27.46 kg/m2      O2 Device: Room air     Temp (24hrs), Av.4 °F (36.9 °C), Min:98.1 °F (36.7 °C), Max:98.8 °F (37.1 °C)              General:  Alert, cooperative, no distress, appears stated age. Head:  Normocephalic, without obvious abnormality, atraumatic. Eyes:  Conjunctivae/corneas clear. PERRL, EOMs intact. Neck: Supple, symmetrical, trachea midline   Lungs:   Clear to auscultation bilaterally. Heart:  Regular rate and rhythm, S1, S2 normal, no murmur, click, rub or gallop. Abdomen:   Soft, non-tender. Bowel sounds normal. No masses,  No organomegaly. Extremities: Extremities normal, atraumatic, no cyanosis or edema. No calf tenderness or cords. Pulses: 2+ and symmetric all extremities. Skin: Skin color, texture, turgor normal. No rashes or lesions   Neurologic: CNII-XII intact. Alert and oriented X 2 at this moment [person,palce] - waxes and wanes but improved.          Signed:  Mili Sheppard MD  2017  12:28 PM

## 2017-07-11 NOTE — DISCHARGE INSTRUCTIONS
Patient Discharge Instructions    Anjum Emerson / [id number] : 1932    Admitted 2017 Discharged: 2017 12:28 PM     ACUTE DIAGNOSES:  NIMCO (acute kidney injury) [facility]    CHRONIC MEDICAL DIAGNOSES:  Problem List as of 2017  Date Reviewed: 2017          Codes Class Noted - Resolved    UTI (urinary tract infection) ICD-10-CM: N39.0  ICD-9-CM: 599.0  2017 - Present        Acute respiratory failure ([facility].) ICD-10-CM: J96.00  ICD-9-CM: 518.81  2014 - Present        PE (pulmonary embolism) ICD-10-CM: I26.99  ICD-9-CM: 415.19  2014 - Present        NIMCO (acute kidney injury) ([facility] 75.) ICD-10-CM: N17.9  ICD-9-CM: 584.9  2014 - Present        Renal lesion ICD-10-CM: N28.9  ICD-9-CM: 593.9  2014 - Present        Autoimmune hemolytic anemia ([facility].) ICD-10-CM: D59.1  ICD-9-CM: 283.0  2014 - Present        Leukocytosis ICD-10-CM: D72.829  ICD-9-CM: 288.60  10/25/2014 - Present        HTN (hypertension) ICD-10-CM: I10  ICD-9-CM: 401.9  10/19/2014 - Present        Depression ICD-10-CM: F32.9  ICD-9-CM: 390  10/19/2014 - Present        Hypothyroidism ICD-10-CM: E03.9  ICD-9-CM: 244.9  10/19/2014 - Present        Diarrhea ICD-10-CM: R19.7  ICD-9-CM: 787.91  10/19/2014 - Present        Leukopenia ICD-10-CM: D72.819  ICD-9-CM: 288.50  10/19/2014 - Present        Anemia ICD-10-CM: D64.9  ICD-9-CM: 285.9  10/19/2014 - Present        Transaminitis ICD-10-CM: R74.0  ICD-9-CM: 790.4  10/19/2014 - Present        Hyperbilirubinemia ICD-10-CM: E80.6  ICD-9-CM: 782.4  10/19/2014 - Present        Weakness ICD-10-CM: R53.1  ICD-9-CM: 780.79  10/18/2014 - Present              DISCHARGE MEDICATIONS:         · It is important that you take the medication exactly as they are prescribed. · Keep your medication in the bottles provided by the pharmacist and keep a list of the medication names, dosages, and times to be taken in your wallet.    · Do not take other medications without consulting your doctor. DIET:  Cardiac Diet and Diabetic Diet    ACTIVITY: Activity as tolerated    ADDITIONAL INFORMATION: If you experience any of the following symptoms then please call your primary care physician or return to the emergency room if you cannot get hold of your doctor: Fever, chills, nausea, vomiting, diarrhea, change in mentation, falling, bleeding, shortness of breath. FOLLOW UP CARE:  Dr. Alexis Chaney MD  you are to call and set up an appointment to see them with in 1 week. Follow-up with specialists at directed by them      Information obtained by :  I understand that if any problems occur once I am at home I am to contact my physician. I understand and acknowledge receipt of the instructions indicated above.                                                                                                                                            Physician's or R.N.'s Signature                                                                  Date/Time                                                                                                                                              Patient or Representative Signature                                                          Date/Time

## 2017-07-11 NOTE — ROUTINE PROCESS
Bedside and Verbal shift change report given to [facility] Larissa(oncoming nurse) by Dennis Cerda (offgoing nurse). Report included the following information SBAR, Kardex, Intake/Output, MAR and Recent Results.

## 2017-07-11 NOTE — PROGRESS NOTES
Problem: Mobility Impaired (Adult and Pediatric)  Goal: *Acute Goals and Plan of Care (Insert Text)  Physical Therapy Goals  Initiated 7/2/2017  1. Patient will move from supine to sit and sit to supine in bed with moderate assistance within 7 day(s). 2. Patient will transfer from bed to chair and chair to bed with moderate assistance using the least restrictive device within 7 day(s). 3. Patient will perform sit to stand with moderate assistance within 7 day(s). 4. Patient will ambulate with moderate assistance for 50 feet with the least restrictive device within 7 day(s). 5. Patient will ascend/descend 3 stairs with double handrail(s) with moderate assistance within 7 day(s). PHYSICAL THERAPY TREATMENT  Patient: Haseeb Piper-Kaylan [de-identified]80 y.o. female)  Date: 7/11/2017  Diagnosis: NIMCO (acute kidney injury) ([facility] [address] <principal problem not specified>       Precautions: DNR, Fall  Chart, physical therapy assessment, plan of care and goals were reviewed. ASSESSMENT:  Pt comes to sit with CGA. Pt dons back brace with mod assist.Pt CGA to stand. Pt ambulated 40ft with increased lateral sway using RW CGA to min assist.Pt has a leg length discrepancy secondary to polio when she was a child. Pt left sitting in chair. Pt progressing slowly. Continue goals. Progression toward goals:  [ ]      Improving appropriately and progressing toward goals  [X]      Improving slowly and progressing toward goals  [ ]      Not making progress toward goals and plan of care will be adjusted       PLAN:  Patient continues to benefit from skilled intervention to address the above impairments. Continue treatment per established plan of care.   Discharge Recommendations:  Cristo Mcfarland  Further Equipment Recommendations for Discharge:  rolling walker       SUBJECTIVE:          OBJECTIVE DATA SUMMARY:   Critical Behavior:  Neurologic State: Confused (at times)  Orientation Level: Disoriented to time  Cognition: Follows commands  Safety/Judgement: Fall prevention, Home safety  Functional Mobility Training:  Bed Mobility:     Supine to Sit: Contact guard assistance;Minimum assistance                          Transfers:  Sit to Stand: Contact guard assistance  Stand to Sit: Contact guard assistance                             Balance:  Sitting: Intact  Sitting - Static: Good (unsupported)  Standing: With support  Ambulation/Gait Training:  Distance (ft): 40 Feet (ft)  Assistive Device: Walker, rolling  Ambulation - Level of Assistance: Contact guard assistance;Minimal assistance        Gait Abnormalities: Antalgic                 Step Length: Left shortened;Right shortened                 Pain:  Pain Scale 1: Numeric (0 - 10)  Pain Intensity 1: 0              Activity Tolerance:   Pt tolerated treatment well. Please refer to the flowsheet for vital signs taken during this treatment.   After treatment:   [X] Patient left in no apparent distress sitting up in chair  [ ] Patient left in no apparent distress in bed  [ ] Call bell left within reach  [ ] Nursing notified  [ ] Caregiver present  [ ] Bed alarm activated      COMMUNICATION/COLLABORATION:   The patients plan of care was discussed with: Physical Therapist     Vanessa Mcqueen PTA   Time Calculation: 23 mins

## 2017-07-11 NOTE — PROGRESS NOTES
[address] [place] [address]  YOB: 1932          Assessment & Plan:   1. NIMCO  - UA: Mild protein,   - US: no hydro  - Cr cont to improve with Volume but now stuck  - might take longer to recover  - No RRT needed  - Etiology unclear: but no biopsy yet  - serologies are unremarkable so far:Neg Hep C, normal complements, normal FLC ration, No M spike  - US: No hydro     2. HTN  - Not on meds       Subjective:   CC:arf  HPI: Patient seen   NIMCO is better but now stuck  No cp/sob  No NSAIDS PTA  NO ABX PTA       Current Facility-Administered Medications   Medication Dose Route Frequency    magnesium oxide (MAG-OX) tablet 400 mg  400 mg Oral BID    potassium chloride SR (KLOR-CON 10) tablet 10 mEq  10 mEq Oral BID    buPROPion XL (WELLBUTRIN XL) tablet 150 mg  150 mg Oral DAILY    traZODone (DESYREL) tablet 25 mg  25 mg Oral QHS    haloperidol lactate (HALDOL) injection 1 mg  1 mg IntraVENous Q6H PRN    haloperidol lactate (HALDOL) injection 1 mg  1 mg IntraMUSCular Q6H PRN    sodium chloride (NS) flush 5-10 mL  5-10 mL IntraVENous PRN    naloxone (NARCAN) injection 0.4 mg  0.4 mg IntraVENous PRN    levothyroxine (SYNTHROID) tablet 50 mcg  50 mcg Oral ACB    venlafaxine-SR (EFFEXOR-XR) capsule 225 mg  225 mg Oral DAILY    traMADol (ULTRAM) tablet 50 mg  50 mg Oral Q6H PRN    heparin (porcine) injection 5,000 Units  5,000 Units SubCUTAneous Q8H    ondansetron (ZOFRAN) injection 4 mg  4 mg IntraVENous Q4H PRN          Objective:     Vitals:  Blood pressure 129/62, pulse 80, temperature 98 °F (36.7 °C), resp. rate 18, height 5' 3\" (1.6 m), weight 70.3 kg (155 lb), SpO2 98 %, not currently breastfeeding.   Temp (24hrs), Av.3 °F (36.8 °C), Min:98 °F (36.7 °C), Max:98.8 °F (37.1 °C)      Intake and Output:          Physical Exam:                Patient is intubated:  no    Physical Examination:   GENERAL ASSESSMENT: NAD  HEENT:Nontraumatic CHEST: CTA  HEART: S1S2  ABDOMEN: Soft,NT,  :Rivera: n  EXTREMITY: EDEMA  NEURO:Grossly non focal          ECG/rhythm:    Data Review      No results for input(s): TNIPOC in the last 72 hours. No lab exists for component: ITNL   No results for input(s): CPK, CKMB, TROIQ in the last 72 hours. Recent Labs      07/10/17   0202  07/09/17   0429   NA  137  140   K  3.6  3.2*   CL  105  106   CO2  21  23   BUN  26*  25*   CREA  2.70*  2.56*   GLU  145*  88   MG  1.5*  1.3*   CA  8.4*  7.8*   ALB  2.8*  2.8*   WBC   --   8.8   HGB   --   10.7*   HCT   --   32.6*   PLT   --   202      No results for input(s): INR, PTP, APTT in the last 72 hours. No lab exists for component: INREXT, INREXT  Needs: urine analysis, urine sodium, protein and creatinine  Lab Results   Component Value Date/Time    Sodium urine, random 55 07/02/2017 02:41 AM    Creatinine, urine 187.78 12/21/2014 03:50 PM         Discussed with:  Family, Colleague    : Dinah Lorenz MD  7/11/2017        [facility]:  [email or web address]  Cher [place] office:  [address] [place] [address], [address] [place] [address],[address]  [place], [address] [facility]  Phone: [phone]  Fax :     [phone]    [facility] office:  [address] [facility], [address]  Phone - [phone]  Fax - [phone]

## 2017-07-11 NOTE — PROGRESS NOTES
Bedside and Verbal shift change report given to Sudheer Olvera (oncoming nurse) by Sarahi Lopez (offgoing nurse). Report included the following information SBAR, Kardex, Intake/Output, MAR and Recent Results.

## 2017-07-17 ENCOUNTER — APPOINTMENT (OUTPATIENT)
Dept: GENERAL RADIOLOGY | Age: 82
DRG: 683 | End: 2017-07-17
Attending: EMERGENCY MEDICINE
Payer: MEDICARE

## 2017-07-17 ENCOUNTER — HOSPITAL ENCOUNTER (INPATIENT)
Age: 82
LOS: 7 days | Discharge: NURSING, REHAB OR LONG-TERM CARE | DRG: 683 | End: 2017-07-24
Attending: EMERGENCY MEDICINE | Admitting: INTERNAL MEDICINE
Payer: MEDICARE

## 2017-07-17 ENCOUNTER — APPOINTMENT (OUTPATIENT)
Dept: CT IMAGING | Age: 82
DRG: 683 | End: 2017-07-17
Attending: EMERGENCY MEDICINE
Payer: MEDICARE

## 2017-07-17 ENCOUNTER — APPOINTMENT (OUTPATIENT)
Dept: GENERAL RADIOLOGY | Age: 82
DRG: 683 | End: 2017-07-17
Attending: INTERNAL MEDICINE
Payer: MEDICARE

## 2017-07-17 DIAGNOSIS — E86.0 DEHYDRATION: ICD-10-CM

## 2017-07-17 DIAGNOSIS — N28.9 ACUTE RENAL INSUFFICIENCY: ICD-10-CM

## 2017-07-17 DIAGNOSIS — R41.0 CONFUSION: Primary | ICD-10-CM

## 2017-07-17 PROBLEM — E87.5 HYPERKALEMIA: Status: ACTIVE | Noted: 2017-07-17

## 2017-07-17 PROBLEM — R41.82 ALTERED MENTAL STATUS: Status: ACTIVE | Noted: 2017-07-17

## 2017-07-17 LAB
ALBUMIN SERPL BCP-MCNC: 3.1 G/DL (ref 3.5–5)
ALBUMIN/GLOB SERPL: 0.8 {RATIO} (ref 1.1–2.2)
ALP SERPL-CCNC: 139 U/L (ref 45–117)
ALT SERPL-CCNC: 15 U/L (ref 12–78)
ANION GAP BLD CALC-SCNC: 9 MMOL/L (ref 5–15)
APPEARANCE UR: CLEAR
AST SERPL W P-5'-P-CCNC: 19 U/L (ref 15–37)
BACTERIA URNS QL MICRO: NEGATIVE /HPF
BASOPHILS # BLD AUTO: 0 K/UL (ref 0–0.1)
BASOPHILS # BLD: 0 % (ref 0–1)
BILIRUB DIRECT SERPL-MCNC: 0.1 MG/DL (ref 0–0.2)
BILIRUB SERPL-MCNC: 0.3 MG/DL (ref 0.2–1)
BILIRUB UR QL: NEGATIVE
BUN SERPL-MCNC: 53 MG/DL (ref 6–20)
BUN/CREAT SERPL: 15 (ref 12–20)
CALCIUM SERPL-MCNC: 9.2 MG/DL (ref 8.5–10.1)
CHLORIDE SERPL-SCNC: 99 MMOL/L (ref 97–108)
CK SERPL-CCNC: 25 U/L (ref 26–192)
CO2 SERPL-SCNC: 27 MMOL/L (ref 21–32)
COLOR UR: ABNORMAL
CREAT SERPL-MCNC: 3.62 MG/DL (ref 0.55–1.02)
DIFFERENTIAL METHOD BLD: ABNORMAL
EOSINOPHIL # BLD: 0.4 K/UL (ref 0–0.4)
EOSINOPHIL NFR BLD: 4 % (ref 0–7)
EPITH CASTS URNS QL MICRO: ABNORMAL /LPF
ERYTHROCYTE [DISTWIDTH] IN BLOOD BY AUTOMATED COUNT: 15.2 % (ref 11.5–14.5)
GLOBULIN SER CALC-MCNC: 3.8 G/DL (ref 2–4)
GLUCOSE BLD STRIP.AUTO-MCNC: 67 MG/DL (ref 65–100)
GLUCOSE BLD STRIP.AUTO-MCNC: 82 MG/DL (ref 65–100)
GLUCOSE SERPL-MCNC: 85 MG/DL (ref 65–100)
GLUCOSE UR STRIP.AUTO-MCNC: NEGATIVE MG/DL
HCT VFR BLD AUTO: 35.6 % (ref 35–47)
HGB BLD-MCNC: 11.1 G/DL (ref 11.5–16)
HGB UR QL STRIP: NEGATIVE
HYALINE CASTS URNS QL MICRO: ABNORMAL /LPF (ref 0–5)
KETONES UR QL STRIP.AUTO: NEGATIVE MG/DL
LACTATE SERPL-SCNC: 1 MMOL/L (ref 0.4–2)
LEUKOCYTE ESTERASE UR QL STRIP.AUTO: NEGATIVE
LYMPHOCYTES # BLD AUTO: 11 % (ref 12–49)
LYMPHOCYTES # BLD: 1.1 K/UL (ref 0.8–3.5)
MAGNESIUM SERPL-MCNC: 2.7 MG/DL (ref 1.6–2.4)
MCH RBC QN AUTO: 28.5 PG (ref 26–34)
MCHC RBC AUTO-ENTMCNC: 31.2 G/DL (ref 30–36.5)
MCV RBC AUTO: 91.5 FL (ref 80–99)
MONOCYTES # BLD: 1.1 K/UL (ref 0–1)
MONOCYTES NFR BLD AUTO: 11 % (ref 5–13)
NEUTS SEG # BLD: 7.8 K/UL (ref 1.8–8)
NEUTS SEG NFR BLD AUTO: 74 % (ref 32–75)
NITRITE UR QL STRIP.AUTO: NEGATIVE
PH UR STRIP: 7 [PH] (ref 5–8)
PLATELET # BLD AUTO: 349 K/UL (ref 150–400)
POTASSIUM SERPL-SCNC: 6.1 MMOL/L (ref 3.5–5.1)
PROT SERPL-MCNC: 6.9 G/DL (ref 6.4–8.2)
PROT UR STRIP-MCNC: 30 MG/DL
RBC # BLD AUTO: 3.89 M/UL (ref 3.8–5.2)
RBC #/AREA URNS HPF: ABNORMAL /HPF (ref 0–5)
RBC MORPH BLD: ABNORMAL
SERVICE CMNT-IMP: NORMAL
SERVICE CMNT-IMP: NORMAL
SODIUM SERPL-SCNC: 135 MMOL/L (ref 136–145)
SP GR UR REFRACTOMETRY: 1.01 (ref 1–1.03)
TROPONIN I SERPL-MCNC: <0.04 NG/ML
UA: UC IF INDICATED,UAUC: ABNORMAL
UROBILINOGEN UR QL STRIP.AUTO: 0.2 EU/DL (ref 0.2–1)
WBC # BLD AUTO: 10.4 K/UL (ref 3.6–11)
WBC URNS QL MICRO: ABNORMAL /HPF (ref 0–4)

## 2017-07-17 PROCEDURE — 77030005563 HC CATH URETH INT MMGH -A

## 2017-07-17 PROCEDURE — 36415 COLL VENOUS BLD VENIPUNCTURE: CPT | Performed by: EMERGENCY MEDICINE

## 2017-07-17 PROCEDURE — 82550 ASSAY OF CK (CPK): CPT | Performed by: EMERGENCY MEDICINE

## 2017-07-17 PROCEDURE — 74011250636 HC RX REV CODE- 250/636: Performed by: EMERGENCY MEDICINE

## 2017-07-17 PROCEDURE — 70450 CT HEAD/BRAIN W/O DYE: CPT

## 2017-07-17 PROCEDURE — 83605 ASSAY OF LACTIC ACID: CPT | Performed by: EMERGENCY MEDICINE

## 2017-07-17 PROCEDURE — 65270000029 HC RM PRIVATE

## 2017-07-17 PROCEDURE — 73060 X-RAY EXAM OF HUMERUS: CPT

## 2017-07-17 PROCEDURE — 80048 BASIC METABOLIC PNL TOTAL CA: CPT | Performed by: EMERGENCY MEDICINE

## 2017-07-17 PROCEDURE — 82962 GLUCOSE BLOOD TEST: CPT

## 2017-07-17 PROCEDURE — 65660000000 HC RM CCU STEPDOWN

## 2017-07-17 PROCEDURE — 74011250637 HC RX REV CODE- 250/637: Performed by: INTERNAL MEDICINE

## 2017-07-17 PROCEDURE — 83735 ASSAY OF MAGNESIUM: CPT | Performed by: EMERGENCY MEDICINE

## 2017-07-17 PROCEDURE — 84484 ASSAY OF TROPONIN QUANT: CPT | Performed by: EMERGENCY MEDICINE

## 2017-07-17 PROCEDURE — 85025 COMPLETE CBC W/AUTO DIFF WBC: CPT | Performed by: EMERGENCY MEDICINE

## 2017-07-17 PROCEDURE — 93005 ELECTROCARDIOGRAM TRACING: CPT

## 2017-07-17 PROCEDURE — 87040 BLOOD CULTURE FOR BACTERIA: CPT | Performed by: EMERGENCY MEDICINE

## 2017-07-17 PROCEDURE — 71010 XR CHEST PORT: CPT

## 2017-07-17 PROCEDURE — 81001 URINALYSIS AUTO W/SCOPE: CPT | Performed by: EMERGENCY MEDICINE

## 2017-07-17 PROCEDURE — 74011250636 HC RX REV CODE- 250/636: Performed by: INTERNAL MEDICINE

## 2017-07-17 PROCEDURE — 99285 EMERGENCY DEPT VISIT HI MDM: CPT

## 2017-07-17 PROCEDURE — 51701 INSERT BLADDER CATHETER: CPT

## 2017-07-17 PROCEDURE — 80076 HEPATIC FUNCTION PANEL: CPT | Performed by: EMERGENCY MEDICINE

## 2017-07-17 RX ORDER — SODIUM CHLORIDE 0.9 % (FLUSH) 0.9 %
5-10 SYRINGE (ML) INJECTION EVERY 8 HOURS
Status: DISCONTINUED | OUTPATIENT
Start: 2017-07-17 | End: 2017-07-23

## 2017-07-17 RX ORDER — QUETIAPINE FUMARATE 25 MG/1
50 TABLET, FILM COATED ORAL
Status: DISCONTINUED | OUTPATIENT
Start: 2017-07-17 | End: 2017-07-24 | Stop reason: HOSPADM

## 2017-07-17 RX ORDER — NALOXONE HYDROCHLORIDE 0.4 MG/ML
0.4 INJECTION, SOLUTION INTRAMUSCULAR; INTRAVENOUS; SUBCUTANEOUS AS NEEDED
Status: DISCONTINUED | OUTPATIENT
Start: 2017-07-17 | End: 2017-07-24 | Stop reason: HOSPADM

## 2017-07-17 RX ORDER — TRAMADOL HYDROCHLORIDE 50 MG/1
50 TABLET ORAL
COMMUNITY
End: 2017-07-17

## 2017-07-17 RX ORDER — SODIUM CHLORIDE 0.9 % (FLUSH) 0.9 %
5-10 SYRINGE (ML) INJECTION AS NEEDED
Status: DISCONTINUED | OUTPATIENT
Start: 2017-07-17 | End: 2017-07-23

## 2017-07-17 RX ORDER — SODIUM POLYSTYRENE SULFONATE 15 G/60ML
15 SUSPENSION ORAL; RECTAL
Status: COMPLETED | OUTPATIENT
Start: 2017-07-17 | End: 2017-07-17

## 2017-07-17 RX ORDER — SODIUM CHLORIDE 9 MG/ML
75 INJECTION, SOLUTION INTRAVENOUS CONTINUOUS
Status: DISCONTINUED | OUTPATIENT
Start: 2017-07-17 | End: 2017-07-21

## 2017-07-17 RX ORDER — TRAMADOL HYDROCHLORIDE 50 MG/1
50 TABLET ORAL
COMMUNITY
End: 2017-07-24

## 2017-07-17 RX ORDER — GABAPENTIN 100 MG/1
100 CAPSULE ORAL 2 TIMES DAILY
Status: CANCELLED | OUTPATIENT
Start: 2017-07-18

## 2017-07-17 RX ORDER — VENLAFAXINE HYDROCHLORIDE 37.5 MG/1
75 CAPSULE, EXTENDED RELEASE ORAL DAILY
Status: CANCELLED | OUTPATIENT
Start: 2017-07-18

## 2017-07-17 RX ADMIN — QUETIAPINE FUMARATE 50 MG: 25 TABLET ORAL at 23:36

## 2017-07-17 RX ADMIN — SODIUM CHLORIDE 1000 ML: 900 INJECTION, SOLUTION INTRAVENOUS at 20:58

## 2017-07-17 RX ADMIN — SODIUM POLYSTYRENE SULFONATE 15 G: 15 SUSPENSION ORAL; RECTAL at 22:26

## 2017-07-17 RX ADMIN — SODIUM CHLORIDE 75 ML/HR: 900 INJECTION, SOLUTION INTRAVENOUS at 23:35

## 2017-07-17 NOTE — IP AVS SNAPSHOT
[address] 
[phone] Patient: Pablo Montgomery MRN: [id number] SLO:26[birth year] Current Discharge Medication List  
  
CONTINUE these medications which have NOT CHANGED Dose & Instructions Dispensing Information Comments Morning Noon Evening Bedtime EAR WAX DROPS OT Your last dose was: Your next dose is:    
   
   
 Dose:  1 Drop  
1 drop by Otic route daily as needed. Refills:  0  
     
   
   
   
  
 * EFFEXOR  mg capsule Generic drug:  venlafaxine-SR Your last dose was: Your next dose is:    
   
   
 Dose:  150 mg Take 150 mg by mouth daily. In addition to 75 mg daily. Total 225 mg daily. Refills:  0  
     
   
   
   
  
 * venlafaxine-SR 75 mg capsule Commonly known as:  EFFEXOR-XR Your last dose was: Your next dose is:    
   
   
 Dose:  75 mg Take 75 mg by mouth daily. In addition to 150 mg daily. Total 225 mg daily. Refills:  0 LEVOTHROID 50 mcg tablet Generic drug:  levothyroxine Your last dose was: Your next dose is:    
   
   
 Dose:  50 mcg Take 50 mcg by mouth Daily (before breakfast). Refills:  0 PROAIR HFA 90 mcg/actuation inhaler Generic drug:  albuterol Your last dose was: Your next dose is:    
   
   
 Dose:  2 Puff Take 2 puffs by inhalation every four (4) hours as needed. Refills:  0  
     
   
   
   
  
 traZODone 50 mg tablet Commonly known as:  Jeevan Moctezuma Your last dose was: Your next dose is:    
   
   
 Dose:  25 mg Take 0.5 Tabs by mouth nightly. Quantity:  30 Tab Refills:  0 WELLBUTRIN  mg XL tablet Generic drug:  buPROPion XL Your last dose was: Your next dose is:    
   
   
 Dose:  300 mg Take 300 mg by mouth every morning. Refills:  0 * Notice: This list has 2 medication(s) that are the same as other medications prescribed for you. Read the directions carefully, and ask your doctor or other care provider to review them with you. STOP taking these medications   
 gabapentin 100 mg capsule Commonly known as:  NEURONTIN  
   
  
 traMADol 50 mg tablet Commonly known as:  [place]

## 2017-07-17 NOTE — ED TRIAGE NOTES
Patient arrives via ems. Patients daughter in law was visiting patient at the [address]. Family states she is not being taken care of, she thought patient was having a stroke.  Called ems BS in route was 74. 67 upon arrival

## 2017-07-17 NOTE — IP AVS SNAPSHOT
[facility] 
 
 
 [address] [place] [address] 
[phone] Patient: Alverto Shirley MRN: [id number] ACU:97/[birth year]/2469 You are allergic to the following Allergen Reactions Percocet (Oxycodone-Acetaminophen) Anxiety Anxiety, shaking Pcn (Penicillins) Rash Patient states this happened ~ 40 years ago Tolerates ceftriaxone Sulfa (Sulfonamide Antibiotics) Itching Vertigo Recent Documentation Height Weight BMI OB Status Smoking Status 1.6 m 70.3 kg 27.46 kg/m2 Hysterectomy Never Smoker Unresulted Labs Order Current Status CULTURE, BLOOD Preliminary result Emergency Contacts Name Discharge Info Relation Home Work Mobile THE [facility] DISCHAR[facility] CAREGIVER [3] Other Relative [6] [phone] About your hospitalization You were admitted on:  July 17, 2017 You last received care in the:  [facility]  MED SURG 2 You were discharged on:  July 24, 2017 Unit phone number:  [phone] Why you were hospitalized Your primary diagnosis was:  Not on File Your diagnoses also included:  Raf (Acute Kidney Injury) (Hcc), Hyperkalemia, Pulmonary Embolism (Hcc), Hypothyroidism, Depression, Autoimmune Hemolytic Anemia (Hcc), Altered Mental Status Providers Seen During Your Hospitalizations Provider Role Specialty Primary office phone Nando Reyes MD Attending Provider Emergency Medicine [phone] Viv Perez MD Attending Provider Internal Medicine [phone] Sandra Conway MD Attending Provider [facility] [phone] Your Primary Care Physician (PCP) Primary Care Physician Office Phone Office Fax Sarita Gu [phone] Follow-up Information Follow up With Details Comments Contact Info [place] [address] 
[phone] Jed Mccoy MD   [address] 
[phone] Current Discharge Medication List  
  
CONTINUE these medications which have NOT CHANGED Dose & Instructions Dispensing Information Comments Morning Noon Evening Bedtime EAR WAX DROPS OT Your last dose was: Your next dose is:    
   
   
 Dose:  1 Drop  
1 drop by Otic route daily as needed. Refills:  0  
     
   
   
   
  
 * EFFEXOR  mg capsule Generic drug:  venlafaxine-SR Your last dose was: Your next dose is:    
   
   
 Dose:  150 mg Take 150 mg by mouth daily. In addition to 75 mg daily. Total 225 mg daily. Refills:  0  
     
   
   
   
  
 * venlafaxine-SR 75 mg capsule Commonly known as:  EFFEXOR-XR Your last dose was: Your next dose is:    
   
   
 Dose:  75 mg Take 75 mg by mouth daily. In addition to 150 mg daily. Total 225 mg daily. Refills:  0 LEVOTHROID 50 mcg tablet Generic drug:  levothyroxine Your last dose was: Your next dose is:    
   
   
 Dose:  50 mcg Take 50 mcg by mouth Daily (before breakfast). Refills:  0 PROAIR HFA 90 mcg/actuation inhaler Generic drug:  albuterol Your last dose was: Your next dose is:    
   
   
 Dose:  2 Puff Take 2 puffs by inhalation every four (4) hours as needed. Refills:  0  
     
   
   
   
  
 traZODone 50 mg tablet Commonly known as:  Teryl Barclay Your last dose was: Your next dose is:    
   
   
 Dose:  25 mg Take 0.5 Tabs by mouth nightly. Quantity:  30 Tab Refills:  0 WELLBUTRIN  mg XL tablet Generic drug:  buPROPion XL Your last dose was: Your next dose is:    
   
   
 Dose:  300 mg Take 300 mg by mouth every morning. Refills:  0 * Notice: This list has 2 medication(s) that are the same as other medications prescribed for you. Read the directions carefully, and ask your doctor or other care provider to review them with you. STOP taking these medications   
 gabapentin 100 mg capsule Commonly known as:  NEURONTIN  
   
  
 traMADol 50 mg tablet Commonly known as:  ULTRAM  
   
  
  
 
  
  
Discharge Instructions Patient Discharge Instructions Samreen Padilla / [id number] : 1932 Admitted 2017 Discharged: 2017 4:09 PM  
 
ACUTE DIAGNOSES: 
NIMCO (acute kidney injury) ([facility] CHRONIC MEDICAL DIAGNOSES: 
Problem List as of 2017  Date Reviewed: 2017 Codes Class Noted - Resolved Hyperkalemia ICD-10-CM: E87.5 ICD-9-CM: 276.7  2017 - Present Altered mental status ICD-10-CM: R41.82 
ICD-9-CM: 780.97  2017 - Present UTI (urinary tract infection) ICD-10-CM: N39.0 ICD-9-CM: 599.0  2017 - Present Acute respiratory failure ([facility] ICD-10-CM: J96.00 
ICD-9-CM: 518.81  2014 - Present Pulmonary embolism ([facility] ICD-10-CM: I26.99 
ICD-9-CM: 415.19  2014 - Present NIMCO (acute kidney injury) ([facility] ICD-10-CM: N17.9 ICD-9-CM: 584.9  2014 - Present Renal lesion ICD-10-CM: N28.9 ICD-9-CM: 593.9  2014 - Present Autoimmune hemolytic anemia (HCC) ICD-10-CM: D59.1 ICD-9-CM: 283.0  2014 - Present Leukocytosis ICD-10-CM: Z84.271 ICD-9-CM: 288.60  10/25/2014 - Present HTN (hypertension) ICD-10-CM: I10 
ICD-9-CM: 401.9  10/19/2014 - Present Depression ICD-10-CM: F32.9 ICD-9-CM: 357  10/19/2014 - Present Hypothyroidism ICD-10-CM: E03.9 ICD-9-CM: 244.9  10/19/2014 - Present Diarrhea ICD-10-CM: R19.7 ICD-9-CM: 787.91  10/19/2014 - Present Leukopenia ICD-10-CM: D72.819 ICD-9-CM: 288.50  10/19/2014 - Present Anemia ICD-10-CM: D64.9 ICD-9-CM: 285.9  10/19/2014 - Present Transaminitis ICD-10-CM: R74.0 ICD-9-CM: 790.4  10/19/2014 - Present Hyperbilirubinemia ICD-10-CM: E80.6 ICD-9-CM: 782.4  10/19/2014 - Present Weakness ICD-10-CM: R53.1 ICD-9-CM: 780.79  10/18/2014 - Present DISCHARGE MEDICATIONS:  
 
 
 
· It is important that you take the medication exactly as they are prescribed. · Keep your medication in the bottles provided by the pharmacist and keep a list of the medication names, dosages, and times to be taken in your wallet. · Do not take other medications without consulting your doctor. DIET:  Cardiac Diet ACTIVITY: Activity as tolerated ADDITIONAL INFORMATION: If you experience any of the following symptoms then please call your primary care physician or return to the emergency room if you cannot get hold of your doctor: Fever, chills, nausea, vomiting, diarrhea, change in mentation, falling, bleeding, shortness of breath. FOLLOW UP CARE: 
Dr. Stephanie Amado MD  you are to call and set up an appointment to see them with in 1 week. Follow-up with specialists at directed by them Information obtained by : 
I understand that if any problems occur once I am at home I am to contact my physician. I understand and acknowledge receipt of the instructions indicated above. Physician's or R.N.'s Signature                                                                  Date/Time Patient or Representative Signature                                                          Date/Time Discharge Orders None MyChart Announcement  We are excited to announce that we are making your provider's discharge notes available to you in [employer]. You will see these notes when they are completed and signed by the physician that discharged you from your recent hospital stay. If you have any questions or concerns about any information you see in [employer], please call the Health Information Department where you were seen or reach out to your Primary Care Provider for more information about your plan of care. Introducing [facility]! Dear Jordana Doe: 
Thank you for requesting a [employer] account. Our records indicate that you have previously registered for a [employer] account but its currently inactive. Please call our [employer] support line at [phone]. Additional Information If you have questions, please visit the Frequently Asked Questions section of the [employer] website at [email or web address] Remember, [employer] is NOT to be used for urgent needs. For medical emergencies, dial 911. Now available from your iPhone and Android! General Information Please provide this summary of care documentation to your next provider. Patient Signature:  ____________________________________________________________ Date:  ____________________________________________________________  
  
[facility] Provider Signature:  ____________________________________________________________ Date:  ____________________________________________________________

## 2017-07-18 ENCOUNTER — APPOINTMENT (OUTPATIENT)
Dept: MRI IMAGING | Age: 82
DRG: 683 | End: 2017-07-18
Attending: INTERNAL MEDICINE
Payer: MEDICARE

## 2017-07-18 LAB
ALBUMIN SERPL BCP-MCNC: 2.8 G/DL (ref 3.5–5)
ALBUMIN/GLOB SERPL: 0.8 {RATIO} (ref 1.1–2.2)
ALP SERPL-CCNC: 125 U/L (ref 45–117)
ALT SERPL-CCNC: 15 U/L (ref 12–78)
ANION GAP BLD CALC-SCNC: 7 MMOL/L (ref 5–15)
AST SERPL W P-5'-P-CCNC: 14 U/L (ref 15–37)
ATRIAL RATE: 72 BPM
BASOPHILS # BLD AUTO: 0 K/UL (ref 0–0.1)
BASOPHILS # BLD: 0 % (ref 0–1)
BILIRUB SERPL-MCNC: 0.3 MG/DL (ref 0.2–1)
BUN SERPL-MCNC: 51 MG/DL (ref 6–20)
BUN/CREAT SERPL: 15 (ref 12–20)
CALCIUM SERPL-MCNC: 8.9 MG/DL (ref 8.5–10.1)
CALCULATED P AXIS, ECG09: 0 DEGREES
CALCULATED R AXIS, ECG10: -19 DEGREES
CALCULATED T AXIS, ECG11: 45 DEGREES
CHLORIDE SERPL-SCNC: 104 MMOL/L (ref 97–108)
CO2 SERPL-SCNC: 26 MMOL/L (ref 21–32)
CREAT SERPL-MCNC: 3.33 MG/DL (ref 0.55–1.02)
DIAGNOSIS, 93000: NORMAL
EOSINOPHIL # BLD: 0.3 K/UL (ref 0–0.4)
EOSINOPHIL NFR BLD: 4 % (ref 0–7)
ERYTHROCYTE [DISTWIDTH] IN BLOOD BY AUTOMATED COUNT: 15.2 % (ref 11.5–14.5)
GLOBULIN SER CALC-MCNC: 3.6 G/DL (ref 2–4)
GLUCOSE SERPL-MCNC: 80 MG/DL (ref 65–100)
HCT VFR BLD AUTO: 34.6 % (ref 35–47)
HGB BLD-MCNC: 10.6 G/DL (ref 11.5–16)
LYMPHOCYTES # BLD AUTO: 18 % (ref 12–49)
LYMPHOCYTES # BLD: 1.5 K/UL (ref 0.8–3.5)
MAGNESIUM SERPL-MCNC: 2.8 MG/DL (ref 1.6–2.4)
MCH RBC QN AUTO: 27.8 PG (ref 26–34)
MCHC RBC AUTO-ENTMCNC: 30.6 G/DL (ref 30–36.5)
MCV RBC AUTO: 90.8 FL (ref 80–99)
MONOCYTES # BLD: 1 K/UL (ref 0–1)
MONOCYTES NFR BLD AUTO: 12 % (ref 5–13)
NEUTS SEG # BLD: 5.5 K/UL (ref 1.8–8)
NEUTS SEG NFR BLD AUTO: 66 % (ref 32–75)
P-R INTERVAL, ECG05: 176 MS
PLATELET # BLD AUTO: 330 K/UL (ref 150–400)
POTASSIUM SERPL-SCNC: 5.5 MMOL/L (ref 3.5–5.1)
PROT SERPL-MCNC: 6.4 G/DL (ref 6.4–8.2)
Q-T INTERVAL, ECG07: 398 MS
QRS DURATION, ECG06: 108 MS
QTC CALCULATION (BEZET), ECG08: 435 MS
RBC # BLD AUTO: 3.81 M/UL (ref 3.8–5.2)
SODIUM SERPL-SCNC: 137 MMOL/L (ref 136–145)
VENTRICULAR RATE, ECG03: 72 BPM
WBC # BLD AUTO: 8.3 K/UL (ref 3.6–11)

## 2017-07-18 PROCEDURE — 36415 COLL VENOUS BLD VENIPUNCTURE: CPT | Performed by: EMERGENCY MEDICINE

## 2017-07-18 PROCEDURE — 74011250636 HC RX REV CODE- 250/636: Performed by: INTERNAL MEDICINE

## 2017-07-18 PROCEDURE — 74011250637 HC RX REV CODE- 250/637: Performed by: FAMILY MEDICINE

## 2017-07-18 PROCEDURE — 80053 COMPREHEN METABOLIC PANEL: CPT | Performed by: INTERNAL MEDICINE

## 2017-07-18 PROCEDURE — 65660000000 HC RM CCU STEPDOWN

## 2017-07-18 PROCEDURE — 85025 COMPLETE CBC W/AUTO DIFF WBC: CPT | Performed by: INTERNAL MEDICINE

## 2017-07-18 PROCEDURE — 65270000029 HC RM PRIVATE

## 2017-07-18 PROCEDURE — 70551 MRI BRAIN STEM W/O DYE: CPT

## 2017-07-18 PROCEDURE — 83735 ASSAY OF MAGNESIUM: CPT | Performed by: INTERNAL MEDICINE

## 2017-07-18 PROCEDURE — 74011250637 HC RX REV CODE- 250/637: Performed by: INTERNAL MEDICINE

## 2017-07-18 PROCEDURE — 87040 BLOOD CULTURE FOR BACTERIA: CPT | Performed by: EMERGENCY MEDICINE

## 2017-07-18 RX ORDER — TRAZODONE HYDROCHLORIDE 50 MG/1
25 TABLET ORAL
Status: DISCONTINUED | OUTPATIENT
Start: 2017-07-18 | End: 2017-07-24 | Stop reason: HOSPADM

## 2017-07-18 RX ORDER — VENLAFAXINE HYDROCHLORIDE 150 MG/1
150 CAPSULE, EXTENDED RELEASE ORAL DAILY
Status: DISCONTINUED | OUTPATIENT
Start: 2017-07-18 | End: 2017-07-24 | Stop reason: HOSPADM

## 2017-07-18 RX ORDER — HEPARIN SODIUM 5000 [USP'U]/ML
5000 INJECTION, SOLUTION INTRAVENOUS; SUBCUTANEOUS EVERY 8 HOURS
Status: DISCONTINUED | OUTPATIENT
Start: 2017-07-18 | End: 2017-07-24 | Stop reason: HOSPADM

## 2017-07-18 RX ORDER — BUPROPION HYDROCHLORIDE 150 MG/1
300 TABLET ORAL
Status: DISCONTINUED | OUTPATIENT
Start: 2017-07-18 | End: 2017-07-18

## 2017-07-18 RX ORDER — TRAMADOL HYDROCHLORIDE 50 MG/1
50 TABLET ORAL
Status: DISCONTINUED | OUTPATIENT
Start: 2017-07-18 | End: 2017-07-24 | Stop reason: HOSPADM

## 2017-07-18 RX ORDER — LEVOTHYROXINE SODIUM 50 UG/1
50 TABLET ORAL
Status: DISCONTINUED | OUTPATIENT
Start: 2017-07-18 | End: 2017-07-24 | Stop reason: HOSPADM

## 2017-07-18 RX ORDER — HALOPERIDOL 5 MG/ML
1 INJECTION INTRAMUSCULAR ONCE
Status: COMPLETED | OUTPATIENT
Start: 2017-07-18 | End: 2017-07-18

## 2017-07-18 RX ADMIN — SODIUM CHLORIDE 75 ML/HR: 900 INJECTION, SOLUTION INTRAVENOUS at 12:41

## 2017-07-18 RX ADMIN — TRAMADOL HYDROCHLORIDE 50 MG: 50 TABLET, FILM COATED ORAL at 15:02

## 2017-07-18 RX ADMIN — Medication 10 ML: at 21:19

## 2017-07-18 RX ADMIN — HEPARIN SODIUM 5000 UNITS: 5000 INJECTION, SOLUTION INTRAVENOUS; SUBCUTANEOUS at 14:38

## 2017-07-18 RX ADMIN — Medication 10 ML: at 14:38

## 2017-07-18 RX ADMIN — VENLAFAXINE HYDROCHLORIDE 150 MG: 150 CAPSULE, EXTENDED RELEASE ORAL at 08:55

## 2017-07-18 RX ADMIN — LEVOTHYROXINE SODIUM 50 MCG: 50 TABLET ORAL at 08:55

## 2017-07-18 RX ADMIN — TRAZODONE HYDROCHLORIDE 25 MG: 50 TABLET ORAL at 21:19

## 2017-07-18 RX ADMIN — TRAZODONE HYDROCHLORIDE 25 MG: 50 TABLET ORAL at 01:05

## 2017-07-18 RX ADMIN — Medication 10 ML: at 08:55

## 2017-07-18 RX ADMIN — HEPARIN SODIUM 5000 UNITS: 5000 INJECTION, SOLUTION INTRAVENOUS; SUBCUTANEOUS at 05:52

## 2017-07-18 RX ADMIN — HEPARIN SODIUM 5000 UNITS: 5000 INJECTION, SOLUTION INTRAVENOUS; SUBCUTANEOUS at 21:19

## 2017-07-18 RX ADMIN — HALOPERIDOL LACTATE 1 MG: 5 INJECTION, SOLUTION INTRAMUSCULAR at 02:21

## 2017-07-18 NOTE — ED NOTES
Bedside and Verbal shift change report given to Karel Turner (oncoming nurse) by Mando Seth RN (offgoing nurse). Report included the following information SBAR, ED Summary, MAR and Recent Results.

## 2017-07-18 NOTE — ED NOTES
Patient in room yelling, incomprehensible words, and restless at this time. Dr. Priscilla Rodney notified. Orders given for Haldol 1 mg IV.

## 2017-07-18 NOTE — ED NOTES
Bedside and Verbal shift change report given to Ellen Crump RN  (oncoming nurse) by Yossi Webb RN (offgoing nurse). Report included the following information SBAR, Kardex, ED Summary, Procedure Summary, Intake/Output, MAR, Recent Results, Med Rec Status and Cardiac Rhythm NSR with PVC's.

## 2017-07-18 NOTE — PROGRESS NOTES
[address]  [phone]    Hospitalist Admission Note      NAME:  Zoila Broussard   :   1932   MRN:  [id number]     PCP:  Angelia Mcclelland MD     Date/Time:  2017 10:30 PM          Subjective:     CHIEF COMPLAINT: AMS, lethargy    HISTORY OF PRESENT ILLNESS:     Ms. Nahun Tabares is a 80 y.o. female w/ hx of HTN, hypothyroidism, depression, prior PE, dementia who presents with weakness and AMS. History provided by daughter at bedside. Pt was recently admitted for NIMCO and found to have UTI. She was d/c'd to SNF and in the last couple of days, family reports severe generalized weakness and worsening confusion. She was unable to ambulate at all. Daughter notes marked worsening of mentation (from 2/10 confusion to 10/10). Hallucinating visually. No fevers or chills. Has had poor PO intake. Family expresses frustration of care provided at [facility]. ED workup showed NIMCO (Cr up to 3.62; was 2.7 at discharge) and hyperkalemia (6.1). Pt is on K supplements. Ms. Nahun Tabares is admitted for further evaluation and management of NIMCO with hyperkalemia. Past Medical History:   Diagnosis Date    Arthritis     Asthma     Autoimmune hemolytic anemia (HCC)     Depression     Heart murmur     Hypertension     Hypothyroidism     Pulmonary embolism (HCC)     Thromboembolus (HCC)     Vertigo         Past Surgical History:   Procedure Laterality Date    HX COLONOSCOPY      HX HEENT      HX HYSTERECTOMY      HX ORTHOPAEDIC  R total hip    HX REFRACTIVE SURGERY         Social History   Substance Use Topics    Smoking status: Never Smoker    Smokeless tobacco: Never Used    Alcohol use No         Family History: family history of various cancers.  +HTN, +DM    Allergies   Allergen Reactions    Percocet [Oxycodone-Acetaminophen] Anxiety     Anxiety, shaking     Pcn [Penicillins] Rash     Patient states this happened ~ 40 years ago  Tolerates ceftriaxone    Sulfa (Sulfonamide Antibiotics) Itching and Vertigo        Prior to Admission medications    Medication Sig Start Date End Date Taking? Authorizing Provider   gabapentin (NEURONTIN) 100 mg capsule Take 100 mg by mouth three (3) times daily. Yes Melo Raya MD   buPROPion XL (WELLBUTRIN XL) 300 mg XL tablet Take 300 mg by mouth every morning. Yes Historical Provider   venlafaxine-SR [facility] 75 mg capsule Take 75 mg by mouth daily. In addition to 150 mg daily. Total 225 mg daily. Yes Historical Provider   venlafaxine-SR (EFFEXOR XR) 150 mg capsule Take 150 mg by mouth daily. In addition to 75 mg daily. Total 225 mg daily. Yes Melo Raya MD   levothyroxine (LEVOTHROID) 50 mcg tablet Take 50 mcg by mouth Daily (before breakfast). Yes Historical Provider   albuterol (PROAIR HFA) 90 mcg/actuation inhaler Take 2 puffs by inhalation every four (4) hours as needed. Yes Historical Provider   CARBAMIDE PEROXIDE (EAR WAX DROPS OT) 1 drop by Otic route daily as needed. Yes Historical Provider   traZODone (DESYREL) 50 mg tablet Take 0.5 Tabs by mouth nightly. 7/11/17   Maury Coleman MD       Review of Systems:  Unable to obtain due to mentation. See H&P       Objective:      VITALS:    Vital signs reviewed; most recent are:    Visit Vitals    /60 (BP 1 Location: Left arm, BP Patient Position: At rest)    Pulse 76    Temp 96.6 °F (35.9 °C)    Resp 17    Ht 5' 3\" (1.6 m)    Wt 70.3 kg (155 lb)    SpO2 96%    BMI 27.46 kg/m2     SpO2 Readings from Last 6 Encounters:   07/17/17 96%   07/11/17 95%   09/16/16 94%   04/04/16 98%   04/08/15 97%   04/08/15 97%        No intake or output data in the 24 hours ending 07/17/17 2230         Exam:     Physical Exam:    Gen:  Elderly, frail, in NAD  HEENT:  No scleral icterus, PERRL, hearing intact to voice, moist mucous membranes  Neck:  Supple, without masses. Thyroid non-tender  Resp:  No accessory muscle use.  CTAB without wheezing, rales, rhonchi  Card: RRR. Normal S1 and S2 without murmurs, rubs, or gallops. No peripheral lower extremity edema. No JVD. Peripheral pulses in tact. Abd:  Normoactive bowel sounds. Soft, non-tender, non-distended. No rebound, no guarding. No appreciable hepatosplenomegaly   Musc:  No cyanosis or clubbing  Skin:  No rashes or ulcers; turgor intact. Neuro:  Cranial nerves are grossly intact, no focal motor weakness but with generalized weakness, follows some commands appropriately but inconsistently  Psych: poor insight, normal affect. Alert, oriented to self and hospital. Does not answer questions appropriately       Labs:    Recent Labs      07/17/17 2022   WBC  10.4   HGB  11.1*   HCT  35.6   PLT  349     Recent Labs      07/17/17 2022   NA  135*   K  6.1*   CL  99   CO2  27   GLU  85   BUN  53*   CREA  3.62*   CA  9.2   MG  2.7*   ALB  3.1*   SGOT  19   ALT  15     No components found for: GLPOC  No results for input(s): PH, PCO2, PO2, HCO3, FIO2 in the last 72 hours. No results for input(s): INR in the last 72 hours. No lab exists for component: INREXT  No results found for: SDES  Lab Results   Component Value Date/Time    Culture result: ESCHERICHIA FERGUSONII 07/02/2017 02:41 AM    Culture result: ESCHERICHIA COLI 09/16/2016 02:10 PM    Culture result: NO GROWTH 6 DAYS 12/21/2014 05:30 PM     All other current labs reviewed in the computer. Imaging/Studies:    CXR: No acute process. Imaging personally reviewed    EKG: NSR, no ST-T changes  EKG personally reviewed    Head CT:  There is no acute intracranial abnormality. Assessment / Plan:       80 y.o. female with hx of HTN, hypothyroidism, depression, prior PE, dementia who presents with weakness and AMS, found to have NIMCO with hyperkalemia     NIMCO (acute kidney injury): recurrent, severe. Cr up to 3.62; was 2.7 at discharge. Of note, her Cr was 1.37 in Sept 2016.  Workup during last admission was unremarkable (HCV, complements, FLC ratio, etc)  -- IVF  -- avoid nephrotoxins, renally dose meds (will hold gabapentin due to tremors)  -- nephrology to follow     Hyperkalemia: due to NIMCO and K supplements. No EKG changes  -- Kayexalate now  -- repeat BMP       Altered mental status: with generalized weakness. Daughter also noted facial droop and slurring of speech earlier today. Reviewed prior notes from neurology and psychiatry, who thought underlying dementia.  Confounded by delirium and underlying dementia  -- MRI brain to rule out CVA  -- hold gabapentin (with tremors)      Depression: with underlying dementia  -- cont Effexor but decrease dose due to NIMCO / severe renal insufficiency  -- hold Wellbutrin  -- cont trazodone      Hypothyroidism (): recent TSH WNR  -- cont LT4      Autoimmune hemolytic anemia (HCC) (): no active hemolysis  -- follow Hg      History of Pulmonary embolism: off anticoagulation       Code Status: DNR, d/w family     Surrogate decision maker: daughter       Previous notes and lab results reviewed, including d/c summary, neurology and psychiatry notes      Total time spent with patient: 79 Minutes     Risk of deterioration: High                 Care Plan discussed with: ED provider, Patient, Family, Nursing Staff and >50% of time spent in counseling and coordination of care    Discussed:  Code Status, Care Plan and D/C Planning     Prophylaxis:  Hep SQ    Disposition:  SNF/LTC       ___________________________________________________    Attending Physician: Alvino Ramirez MD

## 2017-07-18 NOTE — ED PROVIDER NOTES
HPI Comments: 80 y.o. female with past medical history significant for HTN, hypothyroidism, heart murmur, asthma, thromboembolus, PE, and hemolytic anemia who presents from [facility] via EMS for evaluation of confusion. Pt presents with AMS, hx provided by family members. Pt has reportedly been significantly more confused and ambulating less over the past couple days. Family claims pt has been having hallucinations, visualizing  relatives. Pt has been staying at [facility] for rehabilitation after recent hospitalization due to UTI and renal insufficiency. Pt reports left arm pain but denies any other sxs. Today family describes noticing left sided facial droop and speech difficulty, that resolved prior to arrival in the ED. Daughter-in-law expresses concern that pt is dehydrated. Family denies recent fevers or vomiting. There are no other acute medical concerns at this time. Old Chart: Pt was admitted from 17-17 for renal insufficiency and UTI. Pt reportedly had some agitation and AMS during hospitalization, suspected to be secondary to UTI. Psychiatry saw pt and thought pt's AMS would resolve with UTI treatment and medication adjustments. Social hx: No tobacco or EtOH use. PCP: Tressa Ward MD    Full history, physical exam, and ROS unable to be obtained due to:  AMS. The history is provided by the patient and a relative. No  was used. Past Medical History:   Diagnosis Date    Arthritis     Asthma     Autoimmune hemolytic anemia (HCC)     Depression     Heart murmur     Hypertension     Hypothyroidism     Pulmonary embolism (HCC)     Thromboembolus (HCC)     Vertigo        Past Surgical History:   Procedure Laterality Date    HX COLONOSCOPY      HX HEENT      HX HYSTERECTOMY      HX ORTHOPAEDIC  R total hip    HX REFRACTIVE SURGERY           History reviewed. No pertinent family history.     Social History Social History    Marital status:      Spouse name: N/A    Number of children: N/A    Years of education: N/A     Occupational History    Not on file. Social History Main Topics    Smoking status: Never Smoker    Smokeless tobacco: Never Used    Alcohol use No    Drug use: No    Sexual activity: Not on file     Other Topics Concern    Not on file     Social History Narrative         ALLERGIES: Percocet [oxycodone-acetaminophen]; Pcn [penicillins]; and Sulfa (sulfonamide antibiotics)    Review of Systems   Unable to perform ROS: Mental status change       Vitals:    07/17/17 2002   BP: 124/60   Pulse: 76   Resp: 17   Temp: 96.6 °F (35.9 °C)   SpO2: 96%   Weight: 70.3 kg (155 lb)   Height: 5' 3\" (1.6 m)            Physical Exam   Constitutional: She is oriented to person, place, and time. She appears well-developed and well-nourished. Pt is awake, alert, and pleasant. Oriented to person but not place or time. HENT:   Head: Normocephalic and atraumatic. Right Ear: External ear normal.   Left Ear: External ear normal.   Nose: Nose normal.   Mouth/Throat: Oropharynx is clear and moist.   Eyes: EOM are normal. Pupils are equal, round, and reactive to light. No scleral icterus. Neck: Normal range of motion. Neck supple. No JVD present. No tracheal deviation present. No thyromegaly present. Cardiovascular: Normal rate, regular rhythm, normal heart sounds and intact distal pulses. Exam reveals no friction rub. No murmur heard. Pulmonary/Chest: Effort normal and breath sounds normal. No stridor. No respiratory distress. She has no wheezes. She has no rales. She exhibits no tenderness. Abdominal: Soft. Bowel sounds are normal. She exhibits no distension. There is no tenderness. There is no rebound and no guarding. Musculoskeletal: Normal range of motion. She exhibits no edema or tenderness. Lymphadenopathy:     She has no cervical adenopathy.    Neurological: She is alert and oriented to person, place, and time. She has normal reflexes. No cranial nerve deficit or sensory deficit. Coordination normal.   Pt has no facial droop. 5/5 hand  strength, biceps and triceps strength bilaterally. 5/5 plantar/dorsiflexion and extension strength bilaterally. Skin: Skin is warm and dry. No rash noted. No erythema. Psychiatric:   Pt is not oriented to place or time. Pt has disconnected thoughts. Pt is cooperative. Nursing note and vitals reviewed. Note written by Lee. Jerzy Pollack, as dictated by Mony Polk MD 8:15 PM         MDM  Number of Diagnoses or Management Options  Diagnosis management comments: 25-year-old white female presents to the emergency department with confusion. Patient has a history of dementia. Her mental status has been worse over the past several days. The daughter thinks may be her left face was drooped earlier today. We'll check CT scan of the head. We'll check basic blood work. She had a recent UTI. We'll check urinalysis. We'll give IV fluids. I suspect she'll need to be admitted for confusion.          Amount and/or Complexity of Data Reviewed  Clinical lab tests: ordered and reviewed  Tests in the radiology section of CPT®: ordered and reviewed  Tests in the medicine section of CPT®: ordered and reviewed  Decide to obtain previous medical records or to obtain history from someone other than the patient: yes  Obtain history from someone other than the patient: yes  Review and summarize past medical records: yes  Independent visualization of images, tracings, or specimens: yes    Risk of Complications, Morbidity, and/or Mortality  Presenting problems: high  Diagnostic procedures: high  Management options: high    Patient Progress  Patient progress: improved    ED Course       Procedures    9:02 PM  CT scan of the head shows no acute process    Chest x-ray shows no acute process    Blood work shows worsening renal insufficiency likely secondary to dehydration. IV fluids are running. Patient will need to be admitted for further workup of her confusion, hydration. Family is in agreement. ED EKG interpretation:  Rhythm: normal sinus rhythm; Rate (approx.): 72; Axis: normal; Intervals: normal; No ST elevations or depressions. Note written by [facility]. [email or web address], as dictated by Gosia Ibarra MD 9:05 PM    PROGRESS NOTE:  9:08 PM  Obtained urine sample. Updated family on blood work results showing renal insufficiency and plan for admission and hydration. They are in agreement with plan of care. CONSULT NOTE:  9:10 PM Gosia Ibarra MD spoke with Dr. Pavan Negrete, Consult for Hospitalist.  Discussed available diagnostic tests and clinical findings. He will admit pt.

## 2017-07-18 NOTE — PROGRESS NOTES
Daily Progress Note: 7/18/2017  Maria Fernanda Piper-Kaylan Bal MD    Assessment/Plan:   NIMCO (acute kidney injury): recurrent, severe. Cr up to 3.62; was 2.7 at discharge. Of note, her Cr was 1.37 in Sept 2016. Workup during last admission was unremarkable (HCV, complements, FLC ratio, etc)  -- IVF  -- avoid nephrotoxins, renally dose meds (will hold gabapentin due to tremors)  -- nephrology following      Hyperkalemia: due to NIMCO and K supplements. No EKG changes  -- Kayexalate given at admission  -- monitor/recheck       Altered mental status on known dementia: with generalized weakness. Daughter also noted facial droop and slurring of speech earlier today. Reviewed prior notes from neurology and psychiatry, who thought underlying dementia.  Confounded by delirium and underlying dementia  -- hold gabapentin (with tremors)     Depression: with underlying dementia  -- cont Effexor but decrease dose due to NIMCO / severe renal insufficiency  -- hold Wellbutrin  -- cont trazodone       Hypothyroidism (): recent TSH WNR  -- cont LT4       Autoimmune hemolytic anemia (HCC) (): no active hemolysis  -- follow Hg       History of Pulmonary embolism: off anticoagulation         Code Status: DNR, d/w family      Surrogate decision maker: daughter     Problem List:  Problem List as of 7/18/2017  Date Reviewed: 7/2/2017          Codes Class Noted - Resolved    Hyperkalemia ICD-10-CM: E87.5  ICD-9-CM: 276.7  7/17/2017 - Present        Altered mental status ICD-10-CM: R41.82  ICD-9-CM: 780.97  7/17/2017 - Present        UTI (urinary tract infection) ICD-10-CM: N39.0  ICD-9-CM: 599.0  7/2/2017 - Present        Acute respiratory failure ([facility] [address] ICD-10-CM: J96.00  ICD-9-CM: 518.81  12/21/2014 - Present        Pulmonary embolism ([facility] [address] ICD-10-CM: I26.99  ICD-9-CM: 415.19  12/11/2014 - Present        NIMCO (acute kidney injury) ([facility] [address] ICD-10-CM: N17.9  ICD-9-CM: 584.9  12/11/2014 - Present        Renal lesion ICD-10-CM: N28.9  ICD-9-CM: 593.9  11/5/2014 - Present        Autoimmune hemolytic anemia (HCC) ICD-10-CM: D59.1  ICD-9-CM: 283.0  11/5/2014 - Present        Leukocytosis ICD-10-CM: G94.002  ICD-9-CM: 288.60  10/25/2014 - Present        HTN (hypertension) ICD-10-CM: I10  ICD-9-CM: 401.9  10/19/2014 - Present        Depression ICD-10-CM: F32.9  ICD-9-CM: 918  10/19/2014 - Present        Hypothyroidism ICD-10-CM: E03.9  ICD-9-CM: 244.9  10/19/2014 - Present        Diarrhea ICD-10-CM: R19.7  ICD-9-CM: 787.91  10/19/2014 - Present        Leukopenia ICD-10-CM: D72.819  ICD-9-CM: 288.50  10/19/2014 - Present        Anemia ICD-10-CM: D64.9  ICD-9-CM: 285.9  10/19/2014 - Present        Transaminitis ICD-10-CM: R74.0  ICD-9-CM: 790.4  10/19/2014 - Present        Hyperbilirubinemia ICD-10-CM: E80.6  ICD-9-CM: 782.4  10/19/2014 - Present        Weakness ICD-10-CM: R53.1  ICD-9-CM: 780.79  10/18/2014 - Present              Subjective:    80 y.o. female w/ hx of HTN, hypothyroidism, depression, prior PE, dementia who presents with weakness and AMS. History provided by daughter at bedside. Pt was recently admitted for NIMCO and found to have UTI. She was d/c'd to SNF and in the last couple of days, family reports severe generalized weakness and worsening confusion. She was unable to ambulate at all. Daughter notes marked worsening of mentation (from 2/10 confusion to 10/10). Hallucinating visually. No fevers or chills. Has had poor PO intake. Family expresses frustration of care provided at [facility]. ED workup showed NIMCO (Cr up to 3.62; was 2.7 at discharge) and hyperkalemia (6.1). Pt is on K supplements. Ms. Nasreen Choudhary is admitted for further evaluation and management of NIMCO with hyperkalemia ( Dr Hector Gee)    7/18:  Sleepy but will arouse and say a few words. She does know she is in a hospital but not which one. No complaints at this time. K+ was high and received lactulose po.      Review of Systems:   Review of systems not obtained due to patient factors. Objective:   Physical Exam:     Visit Vitals    /51    Pulse 78    Temp 96.6 °F (35.9 °C)    Resp 16    Ht 5' 3\" (1.6 m)    Wt 70.3 kg (155 lb)    SpO2 97%    BMI 27.46 kg/m2      O2 Device: Room air    Temp (24hrs), Av.6 °F (35.9 °C), Min:96.6 °F (35.9 °C), Max:96.6 °F (35.9 °C)    701 - 1900  In: 400 [P.O.:400]  Out: -         General:  Elderly, frail. NAD, appears stated age. Head:  Normocephalic, without obvious abnormality, atraumatic. Eyes:  Conjunctivae/corneas clear. EOMs intact. Nose: Nares normal. Septum midline. Mucosa normal. No drainage or sinus tenderness. Throat: Lips, mucosa, and tongue moist..   Neck: Supple, symmetrical, trachea midline, no adenopathy, thyroid: no enlargement/tenderness/nodules, no carotid bruit and no JVD. Back:   Symmetric, no curvature. ROM normal. No CVA tenderness. Lungs:   Clear to auscultation bilaterally. Chest wall:  No tenderness or deformity. Heart:  Regular rate and rhythm, S1, S2 normal, no murmur, click, rub or gallop. Abdomen:   Soft, non-tender. Bowel sounds normal. No masses,  No organomegaly. Extremities: no cyanosis or edema. No calf tenderness or cords. Pulses: 2+ and symmetric all extremities. Skin: Skin color, texture, turgor normal. No rashes or lesions   Neurologic:  Alert and oriented X 1 and at occas x2. Often does not follow commands. No cogwheeling or rigidity. Gait not tested at this time. Sensation grossly normal to touch.   Gross motor of extremities normal.       Data Review:       Recent Days:  Recent Labs      17   0553  17   WBC  8.3  10.4   HGB  10.6*  11.1*   HCT  34.6*  35.6   PLT  330  349     Recent Labs      17   0553  17   NA  137  135*   K  5.5*  6.1*   CL  104  99   CO2  26  27   GLU  80  85   BUN  51*  53*   CREA  3.33*  3.62*   CA  8.9  9.2   MG  2.8*  2.7*   ALB  2.8*  3.1*   TBILI  0.3  0.3   SGOT  14*  19   ALT  15 15     No results for input(s): PH, PCO2, PO2, HCO3, FIO2 in the last 72 hours. 24 Hour Results:  Recent Results (from the past 24 hour(s))   GLUCOSE, POC    Collection Time: 07/17/17  7:58 PM   Result Value Ref Range    Glucose (POC) 67 65 - 100 mg/dL    Performed by Danae Maxwell, POC    Collection Time: 07/17/17  8:15 PM   Result Value Ref Range    Glucose (POC) 82 65 - 100 mg/dL    Performed by Unique Anne    CBC WITH AUTOMATED DIFF    Collection Time: 07/17/17  8:22 PM   Result Value Ref Range    WBC 10.4 3.6 - 11.0 K/uL    RBC 3.89 3.80 - 5.20 M/uL    HGB 11.1 (L) 11.5 - 16.0 g/dL    HCT 35.6 35.0 - 47.0 %    MCV 91.5 80.0 - 99.0 FL    MCH 28.5 26.0 - 34.0 PG    MCHC 31.2 30.0 - 36.5 g/dL    RDW 15.2 (H) 11.5 - 14.5 %    PLATELET 013 596 - 285 K/uL    NEUTROPHILS 74 32 - 75 %    LYMPHOCYTES 11 (L) 12 - 49 %    MONOCYTES 11 5 - 13 %    EOSINOPHILS 4 0 - 7 %    BASOPHILS 0 0 - 1 %    ABS. NEUTROPHILS 7.8 1.8 - 8.0 K/UL    ABS. LYMPHOCYTES 1.1 0.8 - 3.5 K/UL    ABS. MONOCYTES 1.1 (H) 0.0 - 1.0 K/UL    ABS. EOSINOPHILS 0.4 0.0 - 0.4 K/UL    ABS.  BASOPHILS 0.0 0.0 - 0.1 K/UL    DF SMEAR SCANNED      RBC COMMENTS NORMOCYTIC, NORMOCHROMIC     METABOLIC PANEL, BASIC    Collection Time: 07/17/17  8:22 PM   Result Value Ref Range    Sodium 135 (L) 136 - 145 mmol/L    Potassium 6.1 (H) 3.5 - 5.1 mmol/L    Chloride 99 97 - 108 mmol/L    CO2 27 21 - 32 mmol/L    Anion gap 9 5 - 15 mmol/L    Glucose 85 65 - 100 mg/dL    BUN 53 (H) 6 - 20 MG/DL    Creatinine 3.62 (H) 0.55 - 1.02 MG/DL    BUN/Creatinine ratio 15 12 - 20      GFR est AA 15 (L) >60 ml/min/1.73m2    GFR est non-AA 12 (L) >60 ml/min/1.73m2    Calcium 9.2 8.5 - 10.1 MG/DL   CK W/ REFLX CKMB    Collection Time: 07/17/17  8:22 PM   Result Value Ref Range    CK 25 (L) 26 - 192 U/L   MAGNESIUM    Collection Time: 07/17/17  8:22 PM   Result Value Ref Range    Magnesium 2.7 (H) 1.6 - 2.4 mg/dL   TROPONIN I    Collection Time: 07/17/17  8:22 PM Result Value Ref Range    Troponin-I, Qt. <0.04 <0.05 ng/mL   HEPATIC FUNCTION PANEL    Collection Time: 07/17/17  8:22 PM   Result Value Ref Range    Protein, total 6.9 6.4 - 8.2 g/dL    Albumin 3.1 (L) 3.5 - 5.0 g/dL    Globulin 3.8 2.0 - 4.0 g/dL    A-G Ratio 0.8 (L) 1.1 - 2.2      Bilirubin, total 0.3 0.2 - 1.0 MG/DL    Bilirubin, direct 0.1 0.0 - 0.2 MG/DL    Alk.  phosphatase 139 (H) 45 - 117 U/L    AST (SGOT) 19 15 - 37 U/L    ALT (SGPT) 15 12 - 78 U/L   EKG, 12 LEAD, INITIAL    Collection Time: 07/17/17  8:48 PM   Result Value Ref Range    Ventricular Rate 72 BPM    Atrial Rate 72 BPM    P-R Interval 176 ms    QRS Duration 108 ms    Q-T Interval 398 ms    QTC Calculation (Bezet) 435 ms    Calculated P Axis 0 degrees    Calculated R Axis -19 degrees    Calculated T Axis 45 degrees    Diagnosis       Normal sinus rhythm  Normal ECG  When compared with ECG of 02-JUL-2017 04:16,  No significant change was found  Confirmed by Nena Campa M.D., Kathi Dodson ([id number]) on 7/18/2017 10:47:58 AM     URINALYSIS W/ REFLEX CULTURE    Collection Time: 07/17/17  8:55 PM   Result Value Ref Range    Color YELLOW/STRAW      Appearance CLEAR CLEAR      Specific gravity 1.014 1.003 - 1.030      pH (UA) 7.0 5.0 - 8.0      Protein 30 (A) NEG mg/dL    Glucose NEGATIVE  NEG mg/dL    Ketone NEGATIVE  NEG mg/dL    Bilirubin NEGATIVE  NEG      Blood NEGATIVE  NEG      Urobilinogen 0.2 0.2 - 1.0 EU/dL    Nitrites NEGATIVE  NEG      Leukocyte Esterase NEGATIVE  NEG      WBC 0-4 0 - 4 /hpf    RBC 0-5 0 - 5 /hpf    Epithelial cells FEW FEW /lpf    Bacteria NEGATIVE  NEG /hpf    UA:UC IF INDICATED CULTURE NOT INDICATED BY UA RESULT CNI      Hyaline cast 0-2 0 - 5 /lpf   LACTIC ACID    Collection Time: 07/17/17  8:55 PM   Result Value Ref Range    Lactic acid 1.0 0.4 - 2.0 MMOL/L   CULTURE, BLOOD    Collection Time: 07/17/17  8:55 PM   Result Value Ref Range    Special Requests: NO SPECIAL REQUESTS      Culture result: NO GROWTH AFTER 11 HOURS CULTURE, BLOOD    Collection Time: 07/18/17  5:53 AM   Result Value Ref Range    Special Requests: NO SPECIAL REQUESTS      Culture result: NO GROWTH AFTER 3 HOURS     METABOLIC PANEL, COMPREHENSIVE    Collection Time: 07/18/17  5:53 AM   Result Value Ref Range    Sodium 137 136 - 145 mmol/L    Potassium 5.5 (H) 3.5 - 5.1 mmol/L    Chloride 104 97 - 108 mmol/L    CO2 26 21 - 32 mmol/L    Anion gap 7 5 - 15 mmol/L    Glucose 80 65 - 100 mg/dL    BUN 51 (H) 6 - 20 MG/DL    Creatinine 3.33 (H) 0.55 - 1.02 MG/DL    BUN/Creatinine ratio 15 12 - 20      GFR est AA 16 (L) >60 ml/min/1.73m2    GFR est non-AA 13 (L) >60 ml/min/1.73m2    Calcium 8.9 8.5 - 10.1 MG/DL    Bilirubin, total 0.3 0.2 - 1.0 MG/DL    ALT (SGPT) 15 12 - 78 U/L    AST (SGOT) 14 (L) 15 - 37 U/L    Alk. phosphatase 125 (H) 45 - 117 U/L    Protein, total 6.4 6.4 - 8.2 g/dL    Albumin 2.8 (L) 3.5 - 5.0 g/dL    Globulin 3.6 2.0 - 4.0 g/dL    A-G Ratio 0.8 (L) 1.1 - 2.2     CBC WITH AUTOMATED DIFF    Collection Time: 07/18/17  5:53 AM   Result Value Ref Range    WBC 8.3 3.6 - 11.0 K/uL    RBC 3.81 3.80 - 5.20 M/uL    HGB 10.6 (L) 11.5 - 16.0 g/dL    HCT 34.6 (L) 35.0 - 47.0 %    MCV 90.8 80.0 - 99.0 FL    MCH 27.8 26.0 - 34.0 PG    MCHC 30.6 30.0 - 36.5 g/dL    RDW 15.2 (H) 11.5 - 14.5 %    PLATELET 239 645 - 342 K/uL    NEUTROPHILS 66 32 - 75 %    LYMPHOCYTES 18 12 - 49 %    MONOCYTES 12 5 - 13 %    EOSINOPHILS 4 0 - 7 %    BASOPHILS 0 0 - 1 %    ABS. NEUTROPHILS 5.5 1.8 - 8.0 K/UL    ABS. LYMPHOCYTES 1.5 0.8 - 3.5 K/UL    ABS. MONOCYTES 1.0 0.0 - 1.0 K/UL    ABS. EOSINOPHILS 0.3 0.0 - 0.4 K/UL    ABS.  BASOPHILS 0.0 0.0 - 0.1 K/UL   MAGNESIUM    Collection Time: 07/18/17  5:53 AM   Result Value Ref Range    Magnesium 2.8 (H) 1.6 - 2.4 mg/dL       Medications reviewed  Current Facility-Administered Medications   Medication Dose Route Frequency    levothyroxine (SYNTHROID) tablet 50 mcg  50 mcg Oral ACB    traZODone (DESYREL) tablet 25 mg  25 mg Oral QHS  venlafaxine-SR (EFFEXOR-XR) capsule 150 mg  150 mg Oral DAILY    heparin (porcine) injection 5,000 Units  5,000 Units SubCUTAneous Q8H    traMADol (ULTRAM) tablet 50 mg  50 mg Oral Q6H PRN    sodium chloride (NS) flush 5-10 mL  5-10 mL IntraVENous Q8H    sodium chloride (NS) flush 5-10 mL  5-10 mL IntraVENous PRN    naloxone (NARCAN) injection 0.4 mg  0.4 mg IntraVENous PRN    0.9% sodium chloride infusion  75 mL/hr IntraVENous CONTINUOUS    QUEtiapine (SEROquel) tablet 50 mg  50 mg Oral QHS PRN     Current Outpatient Prescriptions   Medication Sig    traMADol (ULTRAM) 50 mg tablet Take 50 mg by mouth every six (6) hours as needed for Pain.  traZODone (DESYREL) 50 mg tablet Take 0.5 Tabs by mouth nightly.  gabapentin (NEURONTIN) 100 mg capsule Take 100 mg by mouth three (3) times daily.  buPROPion XL (WELLBUTRIN XL) 300 mg XL tablet Take 300 mg by mouth every morning.  venlafaxine-SR (EFFEXOR-XR) 75 mg capsule Take 75 mg by mouth daily. In addition to 150 mg daily. Total 225 mg daily.  venlafaxine-SR (EFFEXOR XR) 150 mg capsule Take 150 mg by mouth daily. In addition to 75 mg daily. Total 225 mg daily.  levothyroxine (LEVOTHROID) 50 mcg tablet Take 50 mcg by mouth Daily (before breakfast).  albuterol (PROAIR HFA) 90 mcg/actuation inhaler Take 2 puffs by inhalation every four (4) hours as needed.  CARBAMIDE PEROXIDE (EAR WAX DROPS OT) 1 drop by Otic route daily as needed. Care Plan discussed with: Patient/Family and Nurse    Total time spent with patient: 30 minutes.     Tanner Cedeno MD

## 2017-07-18 NOTE — CONSULTS
[facility]   [address] [facility], [address]   [address] [facility]       Name:  Nina Genao   MR#:  [id number]   :  1932   Account #:  [de-identified]    Date of Consultation:  2017   Date of Adm:  2017       REQUESTING PHYSICIAN: Dr. Renita Luna: Elevated creatinine. HISTORY OF PRESENT ILLNESS: The patient is an 80-year-old white   female who has a history of chronic kidney disease, stage IV. She was   recently discharged to a nursing home, but apparently has done poorly   at the emergency room with poor p.o. intake and weakness and altered   mental status. She was brought to the hospital for evaluation. She was   found to have acute kidney dysfunction with creatinine of 3.62, up from   2.7 a week ago, and hyperkalemia with a potassium of 6.1. She has   been treated with IV fluids. Her medications have been minimized due   to altered mental status, etc. Repeat labs done early this morning   showed perhaps some initial improvement in renal failure and   hyperkalemia. She is making urine. REVIEW OF SYSTEMS: Unable to obtain due to altered mental status. PAST MEDICAL HISTORY: Chronic kidney disease, stage IV. Hypertension. Hypothyroidism. History of pulmonary embolus. Autoimmune hemolytic anemia. FAMILY HISTORY: There is no significant family history of renal   disease. SOCIAL HISTORY: She does not smoke. PHYSICAL EXAMINATION   VITAL SIGNS: Temperature 96.6, pulse 78, blood pressure 114/51. GENERAL: Elderly, confused white female. EYES: Anicteric. Extraocular movements intact. ENMT: Mucous membranes are dry. There is no epistaxis. NECK: Nontender with no masses. There is no JVD. HEART: Regular. There is no lower extremity edema. LUNGS: Clear with limited effort. ABDOMEN: Soft, nontender. Bowel sounds are active. Hepatosplenomegaly is not appreciated. SKIN: Warm with diminished turgor.  There is no rash. EXTREMITIES: There is no cyanosis or clubbing. There is no synovitis   of hands or wrists bilaterally. NEUROLOGIC: The patient is confused. She answers questions   somewhat incomprehensibly. She is noted to have occasional   myoclonic jerks. LABORATORY: Sodium 137, potassium 5.5, chloride 104, bicarb 26,   BUN 51, creatinine 3.3, white count 8.3, hemoglobin 10.6, platelet 779. RADIOGRAPHIC STUDIES: Chest x-ray shows no acute   cardiopulmonary disease. ASSESSMENT: Acute renal failure superimposed on chronic kidney   disease, stage IV, likely secondary to volume depletion as a   consequence of poor p.o. intake. Her course has been complicated by   altered mental status, which may be related to medications including   gabapentin in the setting of worsening renal function. PLAN   1. Continue intravenous fluids as you were doing. 2. Monitor serial measurements of serum chemistries. 3. Avoid nephrotoxins. 4. Discontinue gabapentin. 5. She had an extensive evaluation with various serologies and serum   protein electrophoresis just a few weeks ago, which was unrevealing. I   do not think any further diagnostic workup is needed at this point to   hunt for unusual causes of renal dysfunction. 6. There is no acute indication for hemodialysis. Will follow with you to   assist in her management during this hospitalization.          MD GONZÁLEZ Segura / WILFRIDO   D:  07/18/2017   14:56   T:  07/18/2017   15:09   Job #:  [id number]

## 2017-07-18 NOTE — PROGRESS NOTES
TRANSFER - OUT REPORT:    Verbal report given to PROMISE Hodges(name) on Maria Fernanda Piper-Kaylan  being transferred to [facility](unit) for routine progression of care       Report consisted of patients Situation, Background, Assessment and   Recommendations(SBAR). Information from the following report(s) SBAR, Kardex, ED Summary, Procedure Summary, Intake/Output and MAR was reviewed with the receiving nurse. Lines:   Peripheral IV 07/17/17 Right Antecubital (Active)   Site Assessment Clean, dry, & intact 7/18/2017  9:00 AM   Phlebitis Assessment 0 7/18/2017  9:00 AM   Infiltration Assessment 0 7/18/2017  9:00 AM   Dressing Status Clean, dry, & intact 7/18/2017  9:00 AM   Dressing Type Tape;Transparent 7/18/2017  9:00 AM   Hub Color/Line Status Pink 7/18/2017  9:00 AM   Action Taken Blood drawn 7/17/2017  8:52 PM   Alcohol Cap Used Yes 7/18/2017  9:00 AM        Opportunity for questions and clarification was provided.       Patient transported with:   Registered Nurse

## 2017-07-18 NOTE — PROGRESS NOTES
Patient family would like her to have a neurology consult while here if possible. Family members would like only family members to visit.   No one from outside family and family  to visit patient

## 2017-07-18 NOTE — PROGRESS NOTES
[facility]: MED RECONCILIATION    Venlafaxine  - may consider reducing dose 25-50% for current renal function or just reduce to 150mg (from 225mg)    Gabapentin  - consider holding or reduce dose until mental status and renal function improves      Information obtained from: RN at [facility]    Allergies: Percocet [oxycodone-acetaminophen]; Pcn [penicillins]; and Sulfa (sulfonamide antibiotics)    Prior to Admission Medications:     Prior to Admission Medications   Prescriptions Last Dose Informant Patient Reported? Taking? CARBAMIDE PEROXIDE (EAR WAX DROPS OT)  Other Yes No   Si drop by Otic route daily as needed. albuterol (PROAIR HFA) 90 mcg/actuation inhaler  Other Yes Yes   Sig: Take 2 puffs by inhalation every four (4) hours as needed. buPROPion XL (WELLBUTRIN XL) 300 mg XL tablet 2017 at AM Other Yes Yes   Sig: Take 300 mg by mouth every morning.   gabapentin (NEURONTIN) 100 mg capsule 2017 at AM Other Yes Yes   Sig: Take 100 mg by mouth three (3) times daily. levothyroxine (LEVOTHROID) 50 mcg tablet 2017 at AM Other Yes Yes   Sig: Take 50 mcg by mouth Daily (before breakfast). traMADol (ULTRAM) 50 mg tablet 2017 at AM Other Yes Yes   Sig: Take 50 mg by mouth every six (6) hours as needed for Pain. traZODone (DESYREL) 50 mg tablet 2017 at PM Other No Yes   Sig: Take 0.5 Tabs by mouth nightly. venlafaxine-SR (EFFEXOR XR) 150 mg capsule 2017 at AM Other Yes Yes   Sig: Take 150 mg by mouth daily. In addition to 75 mg daily. Total 225 mg daily. venlafaxine-SR (EFFEXOR-XR) 75 mg capsule 2017 at AM Other Yes Yes   Sig: Take 75 mg by mouth daily. In addition to 150 mg daily. Total 225 mg daily. Facility-Administered Medications: None       Thank you,  Nuno Carlisle PharmMilan D.

## 2017-07-18 NOTE — PROGRESS NOTES
Gave verbal sign-out over the phone at 6:19 AM to Dr. Radha Dick whose practice has accepted care of this patient.       Faiza Cook MD  7/18/2017

## 2017-07-18 NOTE — CONSULTS
Consult dictated    ARF on CKD 4 due to volume depletion    Rec:  IVF and serial labs  Avoid nephrotoxins  No acute need for RRT

## 2017-07-18 NOTE — PROGRESS NOTES
Bedside and Verbal shift change report given to Abel Mcgarry RN (oncoming nurse) by Bessie Aceves RN (offgoing nurse). Report included the following information SBAR, Kardex, Procedure Summary, Intake/Output and MAR.

## 2017-07-19 LAB
ALBUMIN SERPL BCP-MCNC: 2.8 G/DL (ref 3.5–5)
ALBUMIN/GLOB SERPL: 0.8 {RATIO} (ref 1.1–2.2)
ALP SERPL-CCNC: 128 U/L (ref 45–117)
ALT SERPL-CCNC: 16 U/L (ref 12–78)
ANION GAP BLD CALC-SCNC: 8 MMOL/L (ref 5–15)
AST SERPL W P-5'-P-CCNC: 18 U/L (ref 15–37)
BASOPHILS # BLD AUTO: 0 K/UL (ref 0–0.1)
BASOPHILS # BLD: 0 % (ref 0–1)
BILIRUB SERPL-MCNC: 0.3 MG/DL (ref 0.2–1)
BUN SERPL-MCNC: 40 MG/DL (ref 6–20)
BUN/CREAT SERPL: 12 (ref 12–20)
CALCIUM SERPL-MCNC: 8.2 MG/DL (ref 8.5–10.1)
CHLORIDE SERPL-SCNC: 106 MMOL/L (ref 97–108)
CO2 SERPL-SCNC: 26 MMOL/L (ref 21–32)
CREAT SERPL-MCNC: 3.26 MG/DL (ref 0.55–1.02)
EOSINOPHIL # BLD: 0.4 K/UL (ref 0–0.4)
EOSINOPHIL NFR BLD: 5 % (ref 0–7)
ERYTHROCYTE [DISTWIDTH] IN BLOOD BY AUTOMATED COUNT: 15 % (ref 11.5–14.5)
GLOBULIN SER CALC-MCNC: 3.4 G/DL (ref 2–4)
GLUCOSE SERPL-MCNC: 81 MG/DL (ref 65–100)
HCT VFR BLD AUTO: 34.3 % (ref 35–47)
HGB BLD-MCNC: 10.7 G/DL (ref 11.5–16)
LYMPHOCYTES # BLD AUTO: 19 % (ref 12–49)
LYMPHOCYTES # BLD: 1.3 K/UL (ref 0.8–3.5)
MAGNESIUM SERPL-MCNC: 2.2 MG/DL (ref 1.6–2.4)
MCH RBC QN AUTO: 28.2 PG (ref 26–34)
MCHC RBC AUTO-ENTMCNC: 31.2 G/DL (ref 30–36.5)
MCV RBC AUTO: 90.3 FL (ref 80–99)
MONOCYTES # BLD: 0.7 K/UL (ref 0–1)
MONOCYTES NFR BLD AUTO: 10 % (ref 5–13)
NEUTS SEG # BLD: 4.6 K/UL (ref 1.8–8)
NEUTS SEG NFR BLD AUTO: 66 % (ref 32–75)
PLATELET # BLD AUTO: 350 K/UL (ref 150–400)
POTASSIUM SERPL-SCNC: 4.3 MMOL/L (ref 3.5–5.1)
PROT SERPL-MCNC: 6.2 G/DL (ref 6.4–8.2)
RBC # BLD AUTO: 3.8 M/UL (ref 3.8–5.2)
SODIUM SERPL-SCNC: 140 MMOL/L (ref 136–145)
WBC # BLD AUTO: 7 K/UL (ref 3.6–11)

## 2017-07-19 PROCEDURE — 80053 COMPREHEN METABOLIC PANEL: CPT | Performed by: FAMILY MEDICINE

## 2017-07-19 PROCEDURE — 85025 COMPLETE CBC W/AUTO DIFF WBC: CPT | Performed by: FAMILY MEDICINE

## 2017-07-19 PROCEDURE — 74011250637 HC RX REV CODE- 250/637: Performed by: INTERNAL MEDICINE

## 2017-07-19 PROCEDURE — 74011250636 HC RX REV CODE- 250/636: Performed by: INTERNAL MEDICINE

## 2017-07-19 PROCEDURE — 36415 COLL VENOUS BLD VENIPUNCTURE: CPT | Performed by: FAMILY MEDICINE

## 2017-07-19 PROCEDURE — 83735 ASSAY OF MAGNESIUM: CPT | Performed by: FAMILY MEDICINE

## 2017-07-19 PROCEDURE — 65660000000 HC RM CCU STEPDOWN

## 2017-07-19 PROCEDURE — 65270000029 HC RM PRIVATE

## 2017-07-19 RX ADMIN — SODIUM CHLORIDE 75 ML/HR: 900 INJECTION, SOLUTION INTRAVENOUS at 06:55

## 2017-07-19 RX ADMIN — HEPARIN SODIUM 5000 UNITS: 5000 INJECTION, SOLUTION INTRAVENOUS; SUBCUTANEOUS at 13:43

## 2017-07-19 RX ADMIN — Medication 10 ML: at 06:57

## 2017-07-19 RX ADMIN — VENLAFAXINE HYDROCHLORIDE 150 MG: 150 CAPSULE, EXTENDED RELEASE ORAL at 09:48

## 2017-07-19 RX ADMIN — SODIUM CHLORIDE 75 ML/HR: 900 INJECTION, SOLUTION INTRAVENOUS at 20:34

## 2017-07-19 RX ADMIN — LEVOTHYROXINE SODIUM 50 MCG: 50 TABLET ORAL at 06:56

## 2017-07-19 RX ADMIN — HEPARIN SODIUM 5000 UNITS: 5000 INJECTION, SOLUTION INTRAVENOUS; SUBCUTANEOUS at 06:26

## 2017-07-19 RX ADMIN — TRAZODONE HYDROCHLORIDE 25 MG: 50 TABLET ORAL at 22:05

## 2017-07-19 RX ADMIN — HEPARIN SODIUM 5000 UNITS: 5000 INJECTION, SOLUTION INTRAVENOUS; SUBCUTANEOUS at 22:05

## 2017-07-19 NOTE — PROGRESS NOTES
Bedside and Verbal shift change report given to [place] [id number] (oncoming nurse) by Merced Bearden RN (offgoing nurse). Report included the following information SBAR, Kardex, Intake/Output, MAR and Recent Results.

## 2017-07-19 NOTE — PROGRESS NOTES
07/19/17     MSW met with the patient but she could not provide information. TC to [facility], her POA who is her daughter-in-law. They were dissatisfied with the care at the [id number] [facility] and do not want her to return there. Kay Yung reported that the patient digressed immensely in the one week in the NH. She did not have this confusion before admitted there. Kay Yung reported she has never talked \"crazy talk\" as she had right before admitted here. She was also walking when she first went into the NH but then went \"downhill\". She became weak. Also complaining about pain in the arm. Family very interested in talking with the MD about patient's condition. aKy Yung requested CM sent referral to [employer]. Referral sent through [employer].     HOMA Ortega

## 2017-07-19 NOTE — PROGRESS NOTES
TRANSFER - IN REPORT:    Verbal report received from Vicky Lopez (name) on Maria Fernanda WETZEL Self-Umlang  being received from ED (unit) for routine progression of care      Report consisted of patients Situation, Background, Assessment and   Recommendations(SBAR). Information from the following report(s) SBAR, Kardex, ED Summary, Procedure Summary, Intake/Output, MAR and Recent Results was reviewed with the receiving nurse. Opportunity for questions and clarification was provided. Assessment completed upon patients arrival to unit and care assumed.      Primary Nurse Clarice Longoria RN and Austin Zuñiga RN performed a dual skin assessment on this patient No impairment noted  Rudi score is 17

## 2017-07-19 NOTE — ROUTINE PROCESS
Bedside and Verbal shift change report given to Herrera Barnard RN (oncoming nurse) by Elisabeth Plunkett (offgoing nurse). Report included the following information SBAR, Kardex, Procedure Summary, Intake/Output and MAR.

## 2017-07-19 NOTE — PROGRESS NOTES
Charanjit Li [facility] 79    Maria Fernanda B Self-Umlang  YOB: 1932          Assessment & Plan:   ARF on CKD4  · Felt secondary to IVVD  · Improving with IVF  · Watch labs, avoid nephrotoxins    Hyperkalemia  · Improved with SPS and IVF    Dementia with AMS       Subjective:   CC: f/u ARF  HPI: Renal function improving gradually   ROS: no sob/n/v; poor appetite  Current Facility-Administered Medications   Medication Dose Route Frequency    levothyroxine (SYNTHROID) tablet 50 mcg  50 mcg Oral ACB    traZODone (DESYREL) tablet 25 mg  25 mg Oral QHS    venlafaxine-SR (EFFEXOR-XR) capsule 150 mg  150 mg Oral DAILY    heparin (porcine) injection 5,000 Units  5,000 Units SubCUTAneous Q8H    traMADol (ULTRAM) tablet 50 mg  50 mg Oral Q6H PRN    sodium chloride (NS) flush 5-10 mL  5-10 mL IntraVENous Q8H    sodium chloride (NS) flush 5-10 mL  5-10 mL IntraVENous PRN    naloxone (NARCAN) injection 0.4 mg  0.4 mg IntraVENous PRN    0.9% sodium chloride infusion  75 mL/hr IntraVENous CONTINUOUS    QUEtiapine (SEROquel) tablet 50 mg  50 mg Oral QHS PRN          Objective:     Vitals:  Blood pressure 147/67, pulse 86, temperature 98.2 °F (36.8 °C), resp. rate 18, height 5' 3\" (1.6 m), weight 70.3 kg (155 lb), SpO2 94 %. Temp (24hrs), Av.8 °F (36.6 °C), Min:97.3 °F (36.3 °C), Max:98.4 °F (36.9 °C)      Intake and Output:      1901 -  0700  In: 400 [P.O.:400]  Out: -     Physical Exam:               GENERAL ASSESSMENT: NAD  CHEST: CTA  HEART: S1S2  ABDOMEN: Soft,NT,  :[place]:   EXTREMITY: no EDEMA          ECG/rhythm:    Data Review      No results for input(s): TNIPOC in the last 72 hours.     No lab exists for component: ITNL   Recent Labs      17   TROIQ  <0.04     Recent Labs      17   1044  17   0553  17   NA  140  137  135*   K  4.3  5.5*  6.1*   CL  106  104  99   CO2  26  26  27   BUN  40*  51*  53*   CREA  3.26* 3.33*  3.62*   GLU  81  80  85   MG  2.2  2.8*  2.7*   CA  8.2*  8.9  9.2   ALB  2.8*  2.8*  3.1*   WBC  7.0  8.3  10.4   HGB  10.7*  10.6*  11.1*   HCT  34.3*  34.6*  35.6   PLT  350  330  349      No results for input(s): INR, PTP, APTT in the last 72 hours. No lab exists for component: INREXT  Needs: urine analysis, urine sodium, protein and creatinine  Lab Results   Component Value Date/Time    Sodium urine, random 55 07/02/2017 02:41 AM    Creatinine, urine 187.78 12/21/2014 03:50 PM           : Scott Faith MD  7/19/2017        [facility]:  [email or web address]  [place]er[place] Hernandez office:  [address] [address],[address]  [address]  Phone: [phone]  Fax :     [phone]    [place] office:  [address] [facility]  [facility]  Phone - [phone]  Fax - [phone]

## 2017-07-19 NOTE — PROGRESS NOTES
Daily Progress Note: 7/19/2017  Maria Fernanda Mccoy MD    Assessment/Plan:   NIMCO (acute kidney injury): recurrent, severe. Cr up to 3.62; was 2.7 at discharge. Of note, her Cr was 1.37 in Sept 2016. Workup during last admission was unremarkable (HCV, complements, FLC ratio, etc)  -- IVF  -- avoid nephrotoxins, renally dose meds -- gabapentin   -- nephrology following      Hyperkalemia: due to NIMCO and K supplements. No EKG changes  -- Kayexalate given at admission  -- monitor/recheck       Altered mental status on known dementia: with generalized weakness. Daughter also noted facial droop and slurring of speech earlier today. Reviewed prior notes from neurology and psychiatry, who thought underlying dementia.  Confounded by delirium and underlying dementia  -- hold gabapentin      Depression: with underlying dementia  -- cont Effexor but decrease dose due to NIMCO / severe renal insufficiency  -- hold Wellbutrin  -- cont trazodone       Hypothyroidism (): recent TSH WNR  -- cont LT4       Autoimmune hemolytic anemia (HCC) (): no active hemolysis  -- follow Hg       History of Pulmonary embolism: off anticoagulation         Code Status: DNR, d/w family      Surrogate decision maker: daughter     Problem List:  Problem List as of 7/19/2017  Date Reviewed: 7/2/2017          Codes Class Noted - Resolved    Hyperkalemia ICD-10-CM: E87.5  ICD-9-CM: 276.7  7/17/2017 - Present        Altered mental status ICD-10-CM: R41.82  ICD-9-CM: 780.97  7/17/2017 - Present        UTI (urinary tract infection) ICD-10-CM: N39.0  ICD-9-CM: 599.0  7/2/2017 - Present        Acute respiratory failure ([facility] [address] ICD-10-CM: J96.00  ICD-9-CM: 518.81  12/21/2014 - Present        Pulmonary embolism ([facility] [address] ICD-10-CM: I26.99  ICD-9-CM: 415.19  12/11/2014 - Present        NIMCO (acute kidney injury) ([facility] [address] ICD-10-CM: N17.9  ICD-9-CM: 584.9  12/11/2014 - Present        Renal lesion ICD-10-CM: N28.9  ICD-9-CM: 593.9  11/5/2014 - Present Autoimmune hemolytic anemia (HCC) ICD-10-CM: D59.1  ICD-9-CM: 283.0  11/5/2014 - Present        Leukocytosis ICD-10-CM: L59.969  ICD-9-CM: 288.60  10/25/2014 - Present        HTN (hypertension) ICD-10-CM: I10  ICD-9-CM: 401.9  10/19/2014 - Present        Depression ICD-10-CM: F32.9  ICD-9-CM: 863  10/19/2014 - Present        Hypothyroidism ICD-10-CM: E03.9  ICD-9-CM: 244.9  10/19/2014 - Present        Diarrhea ICD-10-CM: R19.7  ICD-9-CM: 787.91  10/19/2014 - Present        Leukopenia ICD-10-CM: D72.819  ICD-9-CM: 288.50  10/19/2014 - Present        Anemia ICD-10-CM: D64.9  ICD-9-CM: 285.9  10/19/2014 - Present        Transaminitis ICD-10-CM: R74.0  ICD-9-CM: 790.4  10/19/2014 - Present        Hyperbilirubinemia ICD-10-CM: E80.6  ICD-9-CM: 782.4  10/19/2014 - Present        Weakness ICD-10-CM: R53.1  ICD-9-CM: 780.79  10/18/2014 - Present              Subjective:    80 y.o. female w/ hx of HTN, hypothyroidism, depression, prior PE, dementia who presents with weakness and AMS. History provided by daughter at bedside. Pt was recently admitted for NIMCO and found to have UTI. She was d/c'd to SNF and in the last couple of days, family reports severe generalized weakness and worsening confusion. She was unable to ambulate at all. Daughter notes marked worsening of mentation (from 2/10 confusion to 10/10). Hallucinating visually. No fevers or chills. Has had poor PO intake. Family expresses frustration of care provided at [facility]. ED workup showed NIMCO (Cr up to 3.62; was 2.7 at discharge) and hyperkalemia (6.1). Pt is on K supplements. Ms. Kristi Rich is admitted for further evaluation and management of NIMCO with hyperkalemia ( Dr Ledy Quinn)    7/18:  Sleepy but will arouse and say a few words. She does know she is in a hospital but not which one. No complaints at this time. K+ was high and received lactulose po.     7/19:  Awake and alert. Oriented to person but not place at this time. No complaints and no pain she reports. AM labs pending. Review of Systems:   Review of systems not obtained due to patient factors. Objective:   Physical Exam:     Visit Vitals    /62 (BP 1 Location: Right arm, BP Patient Position: At rest)    Pulse 81    Temp 97.4 °F (36.3 °C)    Resp 18    Ht 5' 3\" (1.6 m)    Wt 70.3 kg (155 lb)    SpO2 93%    BMI 27.46 kg/m2      O2 Device: Room air    Temp (24hrs), Av.7 °F (36.5 °C), Min:97.3 °F (36.3 °C), Max:98.4 °F (36.9 °C)        701 -  1900  In: 400 [P.O.:400]  Out: -     General:  Elderly, frail. NAD, appears stated age. Head:  Normocephalic, without obvious abnormality, atraumatic. Eyes:  Conjunctivae/corneas clear. EOMs intact. Nose: Nares normal. Septum midline. Mucosa normal. No drainage or sinus tenderness. Throat: Lips, mucosa, and tongue moist..   Neck: Supple, symmetrical, trachea midline, no adenopathy, thyroid: no enlargement/tenderness/nodules, no carotid bruit and no JVD. Back:   Symmetric, no curvature. ROM normal. No CVA tenderness. Lungs:   Clear to auscultation bilaterally. Chest wall:  No tenderness or deformity. Heart:  Regular rate and rhythm, S1, S2 normal, no murmur, click, rub or gallop. Abdomen:   Soft, non-tender. Bowel sounds normal. No masses,  No organomegaly. Extremities: no cyanosis or edema. No calf tenderness or cords. Pulses: 2+ and symmetric all extremities. Skin: Skin color, texture, turgor normal. No rashes or lesions   Neurologic:  Alert and oriented X 1 and at occas x2. Often does not follow commands. No cogwheeling or rigidity. Gait not tested at this time. Sensation grossly normal to touch.   Gross motor of extremities normal.       Data Review:       Recent Days:  Recent Labs      17   0553  17   WBC  8.3  10.4   HGB  10.6*  11.1*   HCT  34.6*  35.6   PLT  330  349     Recent Labs      17   0553  07/17/17   2022   NA  137  135*   K  5.5*  6.1*   CL  104  99   CO2  26  27   GLU  80 85   BUN  51*  53*   CREA  3.33*  3.62*   CA  8.9  9.2   MG  2.8*  2.7*   ALB  2.8*  3.1*   TBILI  0.3  0.3   SGOT  14*  19   ALT  15  15     No results for input(s): PH, PCO2, PO2, HCO3, FIO2 in the last 72 hours. 24 Hour Results:  No results found for this or any previous visit (from the past 24 hour(s)). Medications reviewed  Current Facility-Administered Medications   Medication Dose Route Frequency    levothyroxine (SYNTHROID) tablet 50 mcg  50 mcg Oral ACB    traZODone (DESYREL) tablet 25 mg  25 mg Oral QHS    venlafaxine-SR (EFFEXOR-XR) capsule 150 mg  150 mg Oral DAILY    heparin (porcine) injection 5,000 Units  5,000 Units SubCUTAneous Q8H    traMADol (ULTRAM) tablet 50 mg  50 mg Oral Q6H PRN    sodium chloride (NS) flush 5-10 mL  5-10 mL IntraVENous Q8H    sodium chloride (NS) flush 5-10 mL  5-10 mL IntraVENous PRN    naloxone (NARCAN) injection 0.4 mg  0.4 mg IntraVENous PRN    0.9% sodium chloride infusion  75 mL/hr IntraVENous CONTINUOUS    QUEtiapine (SEROquel) tablet 50 mg  50 mg Oral QHS PRN       Care Plan discussed with: Patient/Family and Nurse    Total time spent with patient: 30 minutes.     Maury Coleman MD

## 2017-07-20 LAB
ALBUMIN SERPL BCP-MCNC: 2.7 G/DL (ref 3.5–5)
ALBUMIN/GLOB SERPL: 1 {RATIO} (ref 1.1–2.2)
ALP SERPL-CCNC: 129 U/L (ref 45–117)
ALT SERPL-CCNC: 14 U/L (ref 12–78)
ANION GAP BLD CALC-SCNC: 11 MMOL/L (ref 5–15)
AST SERPL W P-5'-P-CCNC: 17 U/L (ref 15–37)
BASOPHILS # BLD AUTO: 0 K/UL (ref 0–0.1)
BASOPHILS # BLD: 1 % (ref 0–1)
BILIRUB SERPL-MCNC: 0.2 MG/DL (ref 0.2–1)
BUN SERPL-MCNC: 37 MG/DL (ref 6–20)
BUN/CREAT SERPL: 12 (ref 12–20)
CALCIUM SERPL-MCNC: 8.5 MG/DL (ref 8.5–10.1)
CHLORIDE SERPL-SCNC: 106 MMOL/L (ref 97–108)
CO2 SERPL-SCNC: 23 MMOL/L (ref 21–32)
CREAT SERPL-MCNC: 3.03 MG/DL (ref 0.55–1.02)
EOSINOPHIL # BLD: 0.3 K/UL (ref 0–0.4)
EOSINOPHIL NFR BLD: 5 % (ref 0–7)
ERYTHROCYTE [DISTWIDTH] IN BLOOD BY AUTOMATED COUNT: 14.9 % (ref 11.5–14.5)
GLOBULIN SER CALC-MCNC: 2.8 G/DL (ref 2–4)
GLUCOSE SERPL-MCNC: 77 MG/DL (ref 65–100)
HCT VFR BLD AUTO: 32.6 % (ref 35–47)
HGB BLD-MCNC: 10.2 G/DL (ref 11.5–16)
LYMPHOCYTES # BLD AUTO: 23 % (ref 12–49)
LYMPHOCYTES # BLD: 1.5 K/UL (ref 0.8–3.5)
MAGNESIUM SERPL-MCNC: 2.1 MG/DL (ref 1.6–2.4)
MCH RBC QN AUTO: 28.1 PG (ref 26–34)
MCHC RBC AUTO-ENTMCNC: 31.3 G/DL (ref 30–36.5)
MCV RBC AUTO: 89.8 FL (ref 80–99)
MONOCYTES # BLD: 0.7 K/UL (ref 0–1)
MONOCYTES NFR BLD AUTO: 11 % (ref 5–13)
NEUTS SEG # BLD: 3.8 K/UL (ref 1.8–8)
NEUTS SEG NFR BLD AUTO: 60 % (ref 32–75)
PLATELET # BLD AUTO: 332 K/UL (ref 150–400)
POTASSIUM SERPL-SCNC: 4 MMOL/L (ref 3.5–5.1)
PROT SERPL-MCNC: 5.5 G/DL (ref 6.4–8.2)
RBC # BLD AUTO: 3.63 M/UL (ref 3.8–5.2)
SODIUM SERPL-SCNC: 140 MMOL/L (ref 136–145)
WBC # BLD AUTO: 6.3 K/UL (ref 3.6–11)

## 2017-07-20 PROCEDURE — 36415 COLL VENOUS BLD VENIPUNCTURE: CPT | Performed by: FAMILY MEDICINE

## 2017-07-20 PROCEDURE — 80053 COMPREHEN METABOLIC PANEL: CPT | Performed by: FAMILY MEDICINE

## 2017-07-20 PROCEDURE — 83735 ASSAY OF MAGNESIUM: CPT | Performed by: FAMILY MEDICINE

## 2017-07-20 PROCEDURE — 85025 COMPLETE CBC W/AUTO DIFF WBC: CPT | Performed by: FAMILY MEDICINE

## 2017-07-20 PROCEDURE — 65660000000 HC RM CCU STEPDOWN

## 2017-07-20 PROCEDURE — 74011250636 HC RX REV CODE- 250/636: Performed by: INTERNAL MEDICINE

## 2017-07-20 PROCEDURE — 74011250637 HC RX REV CODE- 250/637: Performed by: INTERNAL MEDICINE

## 2017-07-20 RX ADMIN — SODIUM CHLORIDE 75 ML/HR: 900 INJECTION, SOLUTION INTRAVENOUS at 09:57

## 2017-07-20 RX ADMIN — LEVOTHYROXINE SODIUM 50 MCG: 50 TABLET ORAL at 06:44

## 2017-07-20 RX ADMIN — HEPARIN SODIUM 5000 UNITS: 5000 INJECTION, SOLUTION INTRAVENOUS; SUBCUTANEOUS at 21:57

## 2017-07-20 RX ADMIN — TRAZODONE HYDROCHLORIDE 25 MG: 50 TABLET ORAL at 21:57

## 2017-07-20 RX ADMIN — Medication 10 ML: at 21:58

## 2017-07-20 RX ADMIN — HEPARIN SODIUM 5000 UNITS: 5000 INJECTION, SOLUTION INTRAVENOUS; SUBCUTANEOUS at 06:21

## 2017-07-20 RX ADMIN — HEPARIN SODIUM 5000 UNITS: 5000 INJECTION, SOLUTION INTRAVENOUS; SUBCUTANEOUS at 14:12

## 2017-07-20 RX ADMIN — Medication 10 ML: at 14:12

## 2017-07-20 RX ADMIN — Medication 10 ML: at 06:21

## 2017-07-20 RX ADMIN — VENLAFAXINE HYDROCHLORIDE 150 MG: 150 CAPSULE, EXTENDED RELEASE ORAL at 09:51

## 2017-07-20 NOTE — PROGRESS NOTES
Bedside and Verbal shift change report given to Ricky Dorsey (oncoming nurse) by Maury Escoto (offgoing nurse). Report included the following information SBAR, Kardex, ED Summary and MAR.

## 2017-07-20 NOTE — PROGRESS NOTES
Daily Progress Note: 7/20/2017  Maria Fernanda [facility]  Mian Mckeon MD    Assessment/Plan:   NIMCO (acute kidney injury): recurrent, severe. Cr up to 3.62; was 2.7 at discharge. Of note, her Cr was 1.37 in Sept 2016. Workup during last admission was unremarkable (HCV, complements, FLC ratio, etc)  -- IVF  -- avoid nephrotoxins, renally dose meds -- gabapentin   -- nephrology following      Hyperkalemia: due to NIMCO and K supplements. No EKG changes  -- Kayexalate given at admission  -- monitor/recheck       Altered mental status on known dementia: with generalized weakness. Daughter also noted facial droop and slurring of speech earlier today. Reviewed prior notes from neurology and psychiatry, who thought underlying dementia.  Confounded by delirium and underlying dementia  -- hold gabapentin      Depression: with underlying dementia  -- cont Effexor but decrease dose due to NIMCO / severe renal insufficiency  -- hold Wellbutrin  -- cont trazodone       Hypothyroidism (): recent TSH WNR  -- cont LT4       Autoimmune hemolytic anemia (HCC) (): no active hemolysis  -- follow Hg       History of Pulmonary embolism: off anticoagulation         Code Status: DNR, d/w family      Surrogate decision maker: daughter     Problem List:  Problem List as of 7/20/2017  Date Reviewed: 7/2/2017          Codes Class Noted - Resolved    Hyperkalemia ICD-10-CM: E87.5  ICD-9-CM: 276.7  7/17/2017 - Present        Altered mental status ICD-10-CM: R41.82  ICD-9-CM: 780.97  7/17/2017 - Present        UTI (urinary tract infection) ICD-10-CM: N39.0  ICD-9-CM: 599.0  7/2/2017 - Present        Acute respiratory failure ([facility] [address] ICD-10-CM: J96.00  ICD-9-CM: 518.81  12/21/2014 - Present        Pulmonary embolism ([facility] [address] ICD-10-CM: I26.99  ICD-9-CM: 415.19  12/11/2014 - Present        NIMCO (acute kidney injury) ([facility] [address] ICD-10-CM: N17.9  ICD-9-CM: 584.9  12/11/2014 - Present        Renal lesion ICD-10-CM: N28.9  ICD-9-CM: 593.9  11/5/2014 - Present Autoimmune hemolytic anemia (HCC) ICD-10-CM: D59.1  ICD-9-CM: 283.0  11/5/2014 - Present        Leukocytosis ICD-10-CM: A41.470  ICD-9-CM: 288.60  10/25/2014 - Present        HTN (hypertension) ICD-10-CM: I10  ICD-9-CM: 401.9  10/19/2014 - Present        Depression ICD-10-CM: F32.9  ICD-9-CM: 296  10/19/2014 - Present        Hypothyroidism ICD-10-CM: E03.9  ICD-9-CM: 244.9  10/19/2014 - Present        Diarrhea ICD-10-CM: R19.7  ICD-9-CM: 787.91  10/19/2014 - Present        Leukopenia ICD-10-CM: D72.819  ICD-9-CM: 288.50  10/19/2014 - Present        Anemia ICD-10-CM: D64.9  ICD-9-CM: 285.9  10/19/2014 - Present        Transaminitis ICD-10-CM: R74.0  ICD-9-CM: 790.4  10/19/2014 - Present        Hyperbilirubinemia ICD-10-CM: E80.6  ICD-9-CM: 782.4  10/19/2014 - Present        Weakness ICD-10-CM: R53.1  ICD-9-CM: 780.79  10/18/2014 - Present              Subjective:    80 y.o. female w/ hx of HTN, hypothyroidism, depression, prior PE, dementia who presents with weakness and AMS. History provided by daughter at bedside. Pt was recently admitted for NIMCO and found to have UTI. She was d/c'd to SNF and in the last couple of days, family reports severe generalized weakness and worsening confusion. She was unable to ambulate at all. Daughter notes marked worsening of mentation (from 2/10 confusion to 10/10). Hallucinating visually. No fevers or chills. Has had poor PO intake. Family expresses frustration of care provided at [facility]. ED workup showed NIMCO (Cr up to 3.62; was 2.7 at discharge) and hyperkalemia (6.1). Pt is on K supplements. Ms. James House is admitted for further evaluation and management of NIMCO with hyperkalemia ( Dr Maverick Suarez)    7/18:  Sleepy but will arouse and say a few words. She does know she is in a hospital but not which one. No complaints at this time. K+ was high and received lactulose po.     7/19:  Awake and alert. Oriented to person but not place at this time. No complaints and no pain she reports. AM labs pending.     :  Little change. Potassium now normal.  Creat grad improving. Cultures neg so far. She will need SNF for rehab. Review of Systems:   Review of systems not obtained due to patient factors. Objective:   Physical Exam:     Visit Vitals    /66 (BP 1 Location: Left arm, BP Patient Position: At rest)    Pulse 74    Temp 98.1 °F (36.7 °C)    Resp 16    Ht 5' 3\" (1.6 m)    Wt 70.3 kg (155 lb)    SpO2 98%    BMI 27.46 kg/m2      O2 Device: Room air    Temp (24hrs), Av.1 °F (36.7 °C), Min:97.6 °F (36.4 °C), Max:98.4 °F (36.9 °C)    1901 - 700  In: -   Out: 5826 [NTHCL:6448]   701 - 1900  In: 400 [P.O.:400]  Out: -     General:  Elderly, frail. NAD, appears stated age. Head:  Normocephalic, without obvious abnormality, atraumatic. Eyes:  Conjunctivae/corneas clear. EOMs intact. Nose: Nares normal. Septum midline. Mucosa normal. No drainage or sinus tenderness. Throat: Lips, mucosa, and tongue moist..   Neck: Supple, symmetrical, trachea midline, no adenopathy, thyroid: no enlargement/tenderness/nodules, no carotid bruit and no JVD. Back:   Symmetric, no curvature. ROM normal. No CVA tenderness. Lungs:   Clear to auscultation bilaterally. Chest wall:  No tenderness or deformity. Heart:  Regular rate and rhythm, S1, S2 normal, no murmur, click, rub or gallop. Abdomen:   Soft, non-tender. Bowel sounds normal. No masses,  No organomegaly. Extremities: no cyanosis or edema. No calf tenderness or cords. Pulses: 2+ and symmetric all extremities. Skin: Skin color, texture, turgor normal. No rashes or lesions   Neurologic:  Alert and oriented X 1 and at occas x2. Often does not follow commands. No cogwheeling or rigidity. Gait not tested at this time. Sensation grossly normal to touch.   Gross motor of extremities normal.       Data Review:       Recent Days:  Recent Labs      17   0449  17   1044  17   0553 WBC  6.3  7.0  8.3   HGB  10.2*  10.7*  10.6*   HCT  32.6*  34.3*  34.6*   PLT  332  350  330     Recent Labs      07/20/17   0449  07/19/17   1044  07/18/17   0553   NA  140  140  137   K  4.0  4.3  5.5*   CL  106  106  104   CO2  23  26  26   GLU  77  81  80   BUN  37*  40*  51*   CREA  3.03*  3.26*  3.33*   CA  8.5  8.2*  8.9   MG  2.1  2.2  2.8*   ALB  2.7*  2.8*  2.8*   TBILI  0.2  0.3  0.3   SGOT  17  18  14*   ALT  14  16  15     No results for input(s): PH, PCO2, PO2, HCO3, FIO2 in the last 72 hours. 24 Hour Results:  Recent Results (from the past 24 hour(s))   METABOLIC PANEL, COMPREHENSIVE    Collection Time: 07/19/17 10:44 AM   Result Value Ref Range    Sodium 140 136 - 145 mmol/L    Potassium 4.3 3.5 - 5.1 mmol/L    Chloride 106 97 - 108 mmol/L    CO2 26 21 - 32 mmol/L    Anion gap 8 5 - 15 mmol/L    Glucose 81 65 - 100 mg/dL    BUN 40 (H) 6 - 20 MG/DL    Creatinine 3.26 (H) 0.55 - 1.02 MG/DL    BUN/Creatinine ratio 12 12 - 20      GFR est AA 16 (L) >60 ml/min/1.73m2    GFR est non-AA 14 (L) >60 ml/min/1.73m2    Calcium 8.2 (L) 8.5 - 10.1 MG/DL    Bilirubin, total 0.3 0.2 - 1.0 MG/DL    ALT (SGPT) 16 12 - 78 U/L    AST (SGOT) 18 15 - 37 U/L    Alk.  phosphatase 128 (H) 45 - 117 U/L    Protein, total 6.2 (L) 6.4 - 8.2 g/dL    Albumin 2.8 (L) 3.5 - 5.0 g/dL    Globulin 3.4 2.0 - 4.0 g/dL    A-G Ratio 0.8 (L) 1.1 - 2.2     MAGNESIUM    Collection Time: 07/19/17 10:44 AM   Result Value Ref Range    Magnesium 2.2 1.6 - 2.4 mg/dL   CBC WITH AUTOMATED DIFF    Collection Time: 07/19/17 10:44 AM   Result Value Ref Range    WBC 7.0 3.6 - 11.0 K/uL    RBC 3.80 3.80 - 5.20 M/uL    HGB 10.7 (L) 11.5 - 16.0 g/dL    HCT 34.3 (L) 35.0 - 47.0 %    MCV 90.3 80.0 - 99.0 FL    MCH 28.2 26.0 - 34.0 PG    MCHC 31.2 30.0 - 36.5 g/dL    RDW 15.0 (H) 11.5 - 14.5 %    PLATELET 511 348 - 086 K/uL    NEUTROPHILS 66 32 - 75 %    LYMPHOCYTES 19 12 - 49 %    MONOCYTES 10 5 - 13 %    EOSINOPHILS 5 0 - 7 %    BASOPHILS 0 0 - 1 % ABS. NEUTROPHILS 4.6 1.8 - 8.0 K/UL    ABS. LYMPHOCYTES 1.3 0.8 - 3.5 K/UL    ABS. MONOCYTES 0.7 0.0 - 1.0 K/UL    ABS. EOSINOPHILS 0.4 0.0 - 0.4 K/UL    ABS. BASOPHILS 0.0 0.0 - 0.1 K/UL   METABOLIC PANEL, COMPREHENSIVE    Collection Time: 07/20/17  4:49 AM   Result Value Ref Range    Sodium 140 136 - 145 mmol/L    Potassium 4.0 3.5 - 5.1 mmol/L    Chloride 106 97 - 108 mmol/L    CO2 23 21 - 32 mmol/L    Anion gap 11 5 - 15 mmol/L    Glucose 77 65 - 100 mg/dL    BUN 37 (H) 6 - 20 MG/DL    Creatinine 3.03 (H) 0.55 - 1.02 MG/DL    BUN/Creatinine ratio 12 12 - 20      GFR est AA 18 (L) >60 ml/min/1.73m2    GFR est non-AA 15 (L) >60 ml/min/1.73m2    Calcium 8.5 8.5 - 10.1 MG/DL    Bilirubin, total 0.2 0.2 - 1.0 MG/DL    ALT (SGPT) 14 12 - 78 U/L    AST (SGOT) 17 15 - 37 U/L    Alk. phosphatase 129 (H) 45 - 117 U/L    Protein, total 5.5 (L) 6.4 - 8.2 g/dL    Albumin 2.7 (L) 3.5 - 5.0 g/dL    Globulin 2.8 2.0 - 4.0 g/dL    A-G Ratio 1.0 (L) 1.1 - 2.2     MAGNESIUM    Collection Time: 07/20/17  4:49 AM   Result Value Ref Range    Magnesium 2.1 1.6 - 2.4 mg/dL   CBC WITH AUTOMATED DIFF    Collection Time: 07/20/17  4:49 AM   Result Value Ref Range    WBC 6.3 3.6 - 11.0 K/uL    RBC 3.63 (L) 3.80 - 5.20 M/uL    HGB 10.2 (L) 11.5 - 16.0 g/dL    HCT 32.6 (L) 35.0 - 47.0 %    MCV 89.8 80.0 - 99.0 FL    MCH 28.1 26.0 - 34.0 PG    MCHC 31.3 30.0 - 36.5 g/dL    RDW 14.9 (H) 11.5 - 14.5 %    PLATELET 520 673 - 184 K/uL    NEUTROPHILS 60 32 - 75 %    LYMPHOCYTES 23 12 - 49 %    MONOCYTES 11 5 - 13 %    EOSINOPHILS 5 0 - 7 %    BASOPHILS 1 0 - 1 %    ABS. NEUTROPHILS 3.8 1.8 - 8.0 K/UL    ABS. LYMPHOCYTES 1.5 0.8 - 3.5 K/UL    ABS. MONOCYTES 0.7 0.0 - 1.0 K/UL    ABS. EOSINOPHILS 0.3 0.0 - 0.4 K/UL    ABS.  BASOPHILS 0.0 0.0 - 0.1 K/UL       Medications reviewed  Current Facility-Administered Medications   Medication Dose Route Frequency    levothyroxine (SYNTHROID) tablet 50 mcg  50 mcg Oral ACB    traZODone (DESYREL) tablet 25 mg 25 mg Oral QHS    venlafaxine-SR (EFFEXOR-XR) capsule 150 mg  150 mg Oral DAILY    heparin (porcine) injection 5,000 Units  5,000 Units SubCUTAneous Q8H    traMADol (ULTRAM) tablet 50 mg  50 mg Oral Q6H PRN    sodium chloride (NS) flush 5-10 mL  5-10 mL IntraVENous Q8H    sodium chloride (NS) flush 5-10 mL  5-10 mL IntraVENous PRN    naloxone (NARCAN) injection 0.4 mg  0.4 mg IntraVENous PRN    0.9% sodium chloride infusion  75 mL/hr IntraVENous CONTINUOUS    QUEtiapine (SEROquel) tablet 50 mg  50 mg Oral QHS PRN       Care Plan discussed with: Patient and Nurse    Total time spent with patient: 30 minutes.     Corrina Fabian MD

## 2017-07-20 NOTE — PROGRESS NOTES
[address] [facility]  YOB: 1932          Assessment & Plan:   ARF on CKD4  · Felt secondary to IVVD  · Improving with IVF  · Continue current management    Hyperkalemia  · Resolved    Dementia with AMS    Anorexia  · Still has poor intake. At risk of recurrent IVVD       Subjective:   CC: f/u ARF  HPI: Creat sl lower   ROS: no sob/n/v; still not much appetite  Current Facility-Administered Medications   Medication Dose Route Frequency    levothyroxine (SYNTHROID) tablet 50 mcg  50 mcg Oral ACB    traZODone (DESYREL) tablet 25 mg  25 mg Oral QHS    venlafaxine-SR (EFFEXOR-XR) capsule 150 mg  150 mg Oral DAILY    heparin (porcine) injection 5,000 Units  5,000 Units SubCUTAneous Q8H    traMADol (ULTRAM) tablet 50 mg  50 mg Oral Q6H PRN    sodium chloride (NS) flush 5-10 mL  5-10 mL IntraVENous Q8H    sodium chloride (NS) flush 5-10 mL  5-10 mL IntraVENous PRN    naloxone (NARCAN) injection 0.4 mg  0.4 mg IntraVENous PRN    0.9% sodium chloride infusion  75 mL/hr IntraVENous CONTINUOUS    QUEtiapine (SEROquel) tablet 50 mg  50 mg Oral QHS PRN          Objective:     Vitals:  Blood pressure 170/74, pulse 73, temperature 98.5 °F (36.9 °C), resp. rate 16, height 5' 3\" (1.6 m), weight 70.3 kg (155 lb), SpO2 98 %. Temp (24hrs), Av.2 °F (36.8 °C), Min:98.1 °F (36.7 °C), Max:98.5 °F (36.9 °C)      Intake and Output:      1901 -  0700  In: -   Out: 1200 [Urine:1200]    Physical Exam:               GENERAL ASSESSMENT: NAD  CHEST: CTA  HEART: S1S2  ABDOMEN: Soft,NT,  EXTREMITY: no EDEMA          ECG/rhythm:    Data Review      No results for input(s): TNIPOC in the last 72 hours.     No lab exists for component: ITNL   Recent Labs      17   TROIQ  <0.04     Recent Labs      17   0449  17   1044  17   0553   NA  140  140  137   K  4.0  4.3  5.5*   CL  106  106  104   CO2  23  26  26   BUN  37*  40* 51*   CREA  3.03*  3.26*  3.33*   GLU  77  81  80   MG  2.1  2.2  2.8*   CA  8.5  8.2*  8.9   ALB  2.7*  2.8*  2.8*   WBC  6.3  7.0  8.3   HGB  10.2*  10.7*  10.6*   HCT  32.6*  34.3*  34.6*   PLT  332  350  330      No results for input(s): INR, PTP, APTT in the last 72 hours. No lab exists for component: INREXT, INREXT  Needs: urine analysis, urine sodium, protein and creatinine  Lab Results   Component Value Date/Time    Sodium urine, random 55 07/02/2017 02:41 AM    Creatinine, urine 187.78 12/21/2014 03:50 PM           : Marcela Barrietnos MD  7/20/2017        [facility]:  [email or web address]  Ximena Anderson office:  [address] [place] [address], [address] [place] [address] [address]  [address]  Phone: [phone]  Fax :     [phone]    [place] office:  [address] [facility], [address] [place] [address]  Phone - [phone]  Fax - [phone]

## 2017-07-20 NOTE — PROGRESS NOTES
Problem: Falls - Risk of  Goal: *Knowledge of fall prevention  Outcome: Progressing Towards Goal  Pt free from injury during the shift. All fall precautions in place.

## 2017-07-20 NOTE — PROGRESS NOTES
Bedside shift change report given to Luisa Whipple RN (oncoming nurse) by Uri Onofre RN (offgoing nurse). Report included the following information SBAR, Kardex, Intake/Output, MAR, Recent Results and Med Rec Status.

## 2017-07-20 NOTE — PROGRESS NOTES
07/20/17  6:55 PM    MSW met with daughter. Informed her that [facility] denied patient's daughter. Gave her a list of SNF;s that she would like visit prior to admittance there. They would like to take her home if the patient could ambulate and get to the bathroom. MSW gave information on Medicaid.     HOMA Ren

## 2017-07-21 LAB
ALBUMIN SERPL BCP-MCNC: 2.6 G/DL (ref 3.5–5)
ALBUMIN/GLOB SERPL: 0.8 {RATIO} (ref 1.1–2.2)
ALP SERPL-CCNC: 128 U/L (ref 45–117)
ALT SERPL-CCNC: 13 U/L (ref 12–78)
ANION GAP BLD CALC-SCNC: 9 MMOL/L (ref 5–15)
AST SERPL W P-5'-P-CCNC: 13 U/L (ref 15–37)
BASOPHILS # BLD AUTO: 0 K/UL (ref 0–0.1)
BASOPHILS # BLD: 0 % (ref 0–1)
BILIRUB SERPL-MCNC: 0.2 MG/DL (ref 0.2–1)
BUN SERPL-MCNC: 30 MG/DL (ref 6–20)
BUN/CREAT SERPL: 11 (ref 12–20)
CALCIUM SERPL-MCNC: 8.1 MG/DL (ref 8.5–10.1)
CHLORIDE SERPL-SCNC: 109 MMOL/L (ref 97–108)
CO2 SERPL-SCNC: 23 MMOL/L (ref 21–32)
CREAT SERPL-MCNC: 2.85 MG/DL (ref 0.55–1.02)
EOSINOPHIL # BLD: 0.3 K/UL (ref 0–0.4)
EOSINOPHIL NFR BLD: 4 % (ref 0–7)
ERYTHROCYTE [DISTWIDTH] IN BLOOD BY AUTOMATED COUNT: 14.7 % (ref 11.5–14.5)
GLOBULIN SER CALC-MCNC: 3.3 G/DL (ref 2–4)
GLUCOSE SERPL-MCNC: 86 MG/DL (ref 65–100)
HCT VFR BLD AUTO: 32.3 % (ref 35–47)
HGB BLD-MCNC: 10.5 G/DL (ref 11.5–16)
LYMPHOCYTES # BLD AUTO: 21 % (ref 12–49)
LYMPHOCYTES # BLD: 1.6 K/UL (ref 0.8–3.5)
MAGNESIUM SERPL-MCNC: 1.8 MG/DL (ref 1.6–2.4)
MCH RBC QN AUTO: 28.5 PG (ref 26–34)
MCHC RBC AUTO-ENTMCNC: 32.5 G/DL (ref 30–36.5)
MCV RBC AUTO: 87.8 FL (ref 80–99)
MONOCYTES # BLD: 0.8 K/UL (ref 0–1)
MONOCYTES NFR BLD AUTO: 11 % (ref 5–13)
NEUTS SEG # BLD: 5 K/UL (ref 1.8–8)
NEUTS SEG NFR BLD AUTO: 64 % (ref 32–75)
PLATELET # BLD AUTO: 339 K/UL (ref 150–400)
POTASSIUM SERPL-SCNC: 3.7 MMOL/L (ref 3.5–5.1)
PROT SERPL-MCNC: 5.9 G/DL (ref 6.4–8.2)
RBC # BLD AUTO: 3.68 M/UL (ref 3.8–5.2)
SODIUM SERPL-SCNC: 141 MMOL/L (ref 136–145)
WBC # BLD AUTO: 7.6 K/UL (ref 3.6–11)

## 2017-07-21 PROCEDURE — 97161 PT EVAL LOW COMPLEX 20 MIN: CPT

## 2017-07-21 PROCEDURE — 74011250637 HC RX REV CODE- 250/637: Performed by: INTERNAL MEDICINE

## 2017-07-21 PROCEDURE — 36415 COLL VENOUS BLD VENIPUNCTURE: CPT | Performed by: FAMILY MEDICINE

## 2017-07-21 PROCEDURE — 83735 ASSAY OF MAGNESIUM: CPT | Performed by: FAMILY MEDICINE

## 2017-07-21 PROCEDURE — 97165 OT EVAL LOW COMPLEX 30 MIN: CPT | Performed by: OCCUPATIONAL THERAPIST

## 2017-07-21 PROCEDURE — 97535 SELF CARE MNGMENT TRAINING: CPT | Performed by: OCCUPATIONAL THERAPIST

## 2017-07-21 PROCEDURE — 97530 THERAPEUTIC ACTIVITIES: CPT

## 2017-07-21 PROCEDURE — 65660000000 HC RM CCU STEPDOWN

## 2017-07-21 PROCEDURE — 80053 COMPREHEN METABOLIC PANEL: CPT | Performed by: FAMILY MEDICINE

## 2017-07-21 PROCEDURE — 74011250637 HC RX REV CODE- 250/637: Performed by: FAMILY MEDICINE

## 2017-07-21 PROCEDURE — 74011250636 HC RX REV CODE- 250/636: Performed by: INTERNAL MEDICINE

## 2017-07-21 PROCEDURE — 85025 COMPLETE CBC W/AUTO DIFF WBC: CPT | Performed by: FAMILY MEDICINE

## 2017-07-21 RX ADMIN — Medication 10 ML: at 22:56

## 2017-07-21 RX ADMIN — VENLAFAXINE HYDROCHLORIDE 150 MG: 150 CAPSULE, EXTENDED RELEASE ORAL at 08:39

## 2017-07-21 RX ADMIN — SODIUM CHLORIDE 75 ML/HR: 900 INJECTION, SOLUTION INTRAVENOUS at 11:35

## 2017-07-21 RX ADMIN — Medication 10 ML: at 05:40

## 2017-07-21 RX ADMIN — LEVOTHYROXINE SODIUM 50 MCG: 50 TABLET ORAL at 06:38

## 2017-07-21 RX ADMIN — HEPARIN SODIUM 5000 UNITS: 5000 INJECTION, SOLUTION INTRAVENOUS; SUBCUTANEOUS at 14:54

## 2017-07-21 RX ADMIN — TRAZODONE HYDROCHLORIDE 25 MG: 50 TABLET ORAL at 22:56

## 2017-07-21 RX ADMIN — HEPARIN SODIUM 5000 UNITS: 5000 INJECTION, SOLUTION INTRAVENOUS; SUBCUTANEOUS at 22:55

## 2017-07-21 RX ADMIN — HEPARIN SODIUM 5000 UNITS: 5000 INJECTION, SOLUTION INTRAVENOUS; SUBCUTANEOUS at 05:40

## 2017-07-21 RX ADMIN — SODIUM CHLORIDE 75 ML/HR: 900 INJECTION, SOLUTION INTRAVENOUS at 01:05

## 2017-07-21 RX ADMIN — TRAMADOL HYDROCHLORIDE 50 MG: 50 TABLET, FILM COATED ORAL at 11:58

## 2017-07-21 NOTE — PROGRESS NOTES
Problem: Self Care Deficits Care Plan (Adult)  Goal: *Acute Goals and Plan of Care (Insert Text)  Occupational Therapy Goals  Initiated 7/21/2017  1. Patient will perform grooming in standing for at least 3 minutes with minimal assistance/contact guard assist within 7 day(s). 2. Patient will perform lower body dressing with minimal assistance/contact guard assist within 7 day(s). 3. Patient will perform toilet transfers with minimal assistance/contact guard assist using best DME for safety within 7 day(s). 4. Patient will perform all aspects of toileting with minimal assistance/contact guard assist within 7 day(s). 5. Patient will participate in upper extremity therapeutic exercise/activities with supervision/set-up for 10 minutes within 7 day(s). 6. Patient will utilize energy conservation techniques during functional activities with verbal and visual cues within 7 day(s). OCCUPATIONAL THERAPY EVALUATION  Patient: Gucci Chung [de-identified]80 y.o. female)  Date: 7/21/2017  Primary Diagnosis: NIMCO (acute kidney injury) ([facility] [address]        Precautions: Fall, DNR      ASSESSMENT :  Based on the objective data described below, the patient presents with impaired cognition, command following and attention to task, decreased strength, endurance, mobility, balance in standing, safety and increased chronic back pain following admission for NIMCO. Pt was recently at [facility] from 7/2-7/11/2017 and discharged to a SNF for rehab. She currently requires up to max A for LE ADLs and toileting and mod A for functional mobility. Prior to previous hospitalizations she was living with her grand daughter in an in-law suite, amb without an AD and with mild memory impairments. Recommend return to SNF at discharge. Patient will benefit from skilled intervention to address the above impairments.   Patients rehabilitation potential is considered to be Guarded  Factors which may influence rehabilitation potential include:   [ ]             None noted  [X]             Mental ability/status  [ ]             Medical condition  [ ]             Home/family situation and support systems  [X]             Safety awareness  [X]             Pain tolerance/management  [ ]             Other:        PLAN :  Recommendations and Planned Interventions:  [X]               Self Care Training                  [X]        Therapeutic Activities  [X]               Functional Mobility Training    [X]        Cognitive Retraining  [X]               Therapeutic Exercises           [X]        Endurance Activities  [X]               Balance Training                   [X]        Neuromuscular Re-Education  [ ]               Visual/Perceptual Training     [X]   Home Safety Training  [X]               Patient Education                 [X]        Family Training/Education  [ ]               Other (comment):     Frequency/Duration: Patient will be followed by occupational therapy 3 times a week to address goals. Discharge Recommendations: Skilled Nursing Facility  Further Equipment Recommendations for Discharge: TBD       SUBJECTIVE:   Patient stated I have this thing between my legs I need to pee in.      OBJECTIVE DATA SUMMARY:   HISTORY:   Past Medical History:   Diagnosis Date    Arthritis      Asthma      Autoimmune hemolytic anemia ([address]      Depression      Heart murmur      Hypertension      Hypothyroidism      [facility] ([facility] [address]      Thromboembolus ([facility] [address]      Vertigo       Past Surgical History:   Procedure Laterality Date    HX COLONOSCOPY        HX HEENT        HX HYSTERECTOMY        HX ORTHOPAEDIC   R total hip    HX REFRACTIVE SURGERY            Prior Level of Function/Home Situation: Pt was recently at [facility] from 7/2-7/11/2017 and discharged to a SNF for rehab. Prior to previous hospitalizations she was living with her grand daughter in an in-law suite, amb without an AD and with mild memory impairments.    Expanded or extensive additional review of patient history: pt familiar to this OT. Info obtained from chart and pt  [address]: [address] [place] [address] Name: Julisa Linaers of [address] [place] [address] after discharge on 7/11/17  One/Two Story Residence: One story  Living Alone: No  Support Systems: Child(meme), Family member(s), Skilled nursing facility  Patient Expects to be Discharged to[de-identified] Skilled nursing facility  Current DME Used/Available at Home: None  Tub or Shower Type: Shower  [X]  Right hand dominant             [ ]  Left hand dominant     EXAMINATION OF PERFORMANCE DEFICITS:  Cognitive/Behavioral Status:  Neurologic State: Alert;Confused  Orientation Level: Oriented to person;Oriented to place; Disoriented to situation;Disoriented to time  Cognition: Decreased attention/concentration; Follows commands; Impaired decision making;Memory loss;Poor safety awareness  Perception: Cues to maintain midline in standing; Tactile;Verbal;Visual  Perseveration: No perseveration noted  Safety/Judgement: Awareness of environment;Decreased awareness of need for assistance;Decreased awareness of need for safety; Fall prevention     Hearing: Auditory  Auditory Impairment: Hard of hearing, left side     Vision/Perceptual:    Acuity: Able to read clock/calendar on wall without difficulty          Range of Motion:  AROM: Generally decreased, functional  PROM: Generally decreased, functional                       Strength:  Strength: Generally decreased, functional                 Coordination:  Coordination: Generally decreased, functional  Fine Motor Skills-Upper: Left Intact; Right Intact    Gross Motor Skills-Upper: Left Intact; Right Intact     Tone & Sensation:  Tone: Normal                          Balance:  Sitting: Intact  Standing: Impaired; With support  Standing - Static: Fair;Constant support  Standing - Dynamic : Poor     Functional Mobility and Transfers for ADLs:  Bed Mobility:  Supine to Sit: Minimum assistance; Additional time;Assist x1 (Ab for upper body due to back pain)  Scooting: Contact guard assistance; Additional time;Assist x1     Transfers:  Sit to Stand: Moderate assistance; Additional time;Assist x1 (flexed posture and LOB)  Stand to Sit: Minimum assistance; Additional time;Assist x1  Bed to Chair: Moderate assistance; Additional time;Assist x1 (small steps with LOB posteriorly)  Toilet Transfer : Moderate assistance; Additional time;Assist x1 (to [facility] with HHA)     ADL Assessment and Intervention:  Feeding: Independent     Oral Facial Hygiene/Grooming: Setup; Additional time;Supervision (seated EOB and chair- cues to maintain attention)     Bathing: Moderate assistance; Additional time;Assist x1 (max cues to attend to task- A for buttocks and feet)     Upper Body Dressing: Setup;Supervision; Additional time     Lower Body Dressing: Maximum assistance; Additional time;Assist x1 (max cues to attend to task- A for buttocks and feet)     Toileting: Maximum assistance; Additional time;Assist x1 (pt incontinent of urine; imp balance and cognition)     Cognitive Retraining  Safety/Judgement: Awareness of environment;Decreased awareness of need for assistance;Decreased awareness of need for safety; Fall prevention     Functional Measure:  Barthel Index:      Bathin  Bladder: 5  Bowels: 5  Groomin  Dressin  Feeding: 10  Mobility: 0  Stairs: 0  Toilet Use: 5  Transfer (Bed to Chair and Back): 5  Total: 35         Barthel and G-code impairment scale:  Percentage of impairment CH  0% CI  1-19% CJ  20-39% CK  40-59% CL  60-79% CM  80-99% CN  100%   Barthel Score 0-100 100 99-80 79-60 59-40 20-39 1-19    0   Barthel Score 0-20 20 17-19 13-16 9-12 5-8 1-4 0      The Barthel ADL Index: Guidelines  1. The index should be used as a record of what a patient does, not as a record of what a patient could do. 2. The main aim is to establish degree of independence from any help, physical or verbal, however minor and for whatever reason.   3. The need for supervision renders the patient not independent. 4. A patient's performance should be established using the best available evidence. Asking the patient, friends/relatives and nurses are the usual sources, but direct observation and common sense are also important. However direct testing is not needed. 5. Usually the patient's performance over the preceding 24-48 hours is important, but occasionally longer periods will be relevant. 6. Middle categories imply that the patient supplies over 50 per cent of the effort. 7. Use of aids to be independent is allowed. Rubie Osler., Barthel, DMilanW. (3839). Functional evaluation: the Barthel Index. [address] [facility] (14)2. Domonique Arias pietro ANDREW Tay, Meagan Chakraborty., Jannette Allen., [place], [address] [place] [address] Ave (1999). Measuring the change indisability after inpatient rehabilitation; comparison of the responsiveness of the Barthel Index and Functional [place] Measure. Journal of Neurology, Neurosurgery, and Psychiatry, 66(4), 069-194. ALISSA Prince.A, LYLA Rojas, & Nadeen Hassan MBREANN. (2004.) Assessment of post-stroke quality of life in cost-effectiveness studies: The usefulness of the Barthel Index and the EuroQoL-5D. Quality of Life Research, 13, 378-68         G codes: In compliance with CMSs Claims Based Outcome Reporting, the following G-code set was chosen for this patient based on their primary functional limitation being treated: The outcome measure chosen to determine the severity of the functional limitation was the Barthel Index with a score of 35/100 which was correlated with the impairment scale.       · Self Care:               - CURRENT STATUS:    CL - 60%-79% impaired, limited or restricted               - GOAL STATUS:           CK - 40%-59% impaired, limited or restricted               - D/C STATUS:                       ---------------To be determined---------------      Occupational Therapy Evaluation Charge Determination   History Examination Decision-Making   LOW Complexity : Brief history review  MEDIUM Complexity : 3-5 performance deficits relating to physical, cognitive , or psychosocial skils that result in activity limitations and / or participation restrictions MEDIUM Complexity : Patient may present with comorbidities that affect occupational performnce. Miniml to moderate modification of tasks or assistance (eg, physical or verbal ) with assesment(s) is necessary to enable patient to complete evaluation       Based on the above components, the patient evaluation is determined to be of the following complexity level: LOW   Pain:  Pt with c/o back pain, but no # given     Activity Tolerance:   Fair  Please refer to the flowsheet for vital signs taken during this treatment. After treatment:   [X] Patient left in no apparent distress sitting up in chair  [ ] Patient left in no apparent distress in bed  [X] Call bell left within reach  [X] Nursing notified  [ ] Caregiver present  [X] Bed alarm activated      COMMUNICATION/EDUCATION:   The patients plan of care was discussed with: Registered Nurse.  [X] Home safety education was provided and the patient/caregiver indicated understanding. [X] Patient/family have participated as able in goal setting and plan of care. [X] Patient/family agree to work toward stated goals and plan of care. [ ] Patient understands intent and goals of therapy, but is neutral about his/her participation. [ ] Patient is unable to participate in goal setting and plan of care. This patients plan of care is appropriate for delegation to \[facility] [place] [facility]\"".      Thank you for this referral.  Talia Hernández OT  Time Calculation: 45 mins

## 2017-07-21 NOTE — PROGRESS NOTES
Problem: Mobility Impaired (Adult and Pediatric)  Goal: *Therapy Goal (Edit Goal, Insert Text)  Physical Therapy Goals  Initiated 7/21/2017  1. Patient will move from supine to sit and sit to supine in bed with modified independence within 7 day(s). 2. Patient will transfer from bed to chair and chair to bed with modified independence using the least restrictive device within 7 day(s). 3. Patient will perform sit to stand with modified independence within 7 day(s). 4. Patient will ambulate with modified independence for 75 feet with the least restrictive device within 7 day(s). 5. Patient will perform HEP exercises with verbal cueing within 7 day(s). PHYSICAL THERAPY EVALUATION  Patient: Sim Lovell Self-Umlang [de-identified]80 y.o. female)  Date: 7/21/2017  Primary Diagnosis: NIMCO (acute kidney injury) ([facility] [address]        Precautions:  Fall, DNR      ASSESSMENT :  Based on the objective data described below, the patient presents with h/o confusion and dementia; she has impaired cognition, command following and decreased strength, balance in standing, safety and increased chronic back pain following admission for NIMCO. Pt was recently at [facility] from 7/2-7/11/2017 and discharged to a SNF for rehab. She currently requires min assist for bed mobility and CGA for transfers to/from bed. Prior to previous hospitalizations she was living with her grand daughter in an in-law suite, amb without an AD and with mild memory impairments. Recommend return to SNF at discharge. Patient will benefit from skilled intervention to address the above impairments.   Patients rehabilitation potential is considered to be Guarded  Factors which may influence rehabilitation potential include:   [ ]         None noted  [X]         Mental ability/status  [X]         Medical condition  [ ]         Home/family situation and support systems  [X]         Safety awareness  [ ]         Pain tolerance/management  [ ]         Other:        PLAN :  Recommendations and Planned Interventions:  [X]           Bed Mobility Training             [ ]    Neuromuscular Re-Education  [X]           Transfer Training                   [ ]    Orthotic/Prosthetic Training  [X]           Gait Training                         [ ]    Modalities  [X]           Therapeutic Exercises           [ ]    Edema Management/Control  [X]           Therapeutic Activities            [X]    Patient and Family Training/Education  [ ]           Other (comment):     Frequency/Duration: Patient will be followed by physical therapy  daily to address goals. Discharge Recommendations: [facility]  Further Equipment Recommendations for Discharge: TBD       SUBJECTIVE:   Patient stated Is 1917!   This is what pt reported when she was asked what year is it.       OBJECTIVE DATA SUMMARY:   HISTORY:    Past Medical History:   Diagnosis Date    Arthritis      Asthma      Autoimmune hemolytic anemia (HCC)      Depression      Heart murmur      Hypertension      Hypothyroidism      Pulmonary embolism (HCC)      Thromboembolus (HCC)      Vertigo       Past Surgical History:   Procedure Laterality Date    HX COLONOSCOPY        HX HEENT        HX HYSTERECTOMY        HX ORTHOPAEDIC   R total hip    HX REFRACTIVE SURGERY         Prior Level of Function/Home Situation: unknown; pt was admitted from SNF  Personal factors and/or comorbidities impacting plan of care:      Home Situation  Home Environment: [address] [place] [address] [facility]  One/Two Story Residence: One story  Living Alone: No  Support Systems: Child(meme), Family member(s)  Patient Expects to be Discharged to[de-identified] Skilled nursing facility  Current DME Used/Available at Home: None  Tub or Shower Type: Shower     EXAMINATION/PRESENTATION/DECISION MAKING:   Critical Behavior:  Neurologic State: Alert, Confused  Orientation Level: Oriented to person, Oriented to place  Cognition: Decreased attention/concentration, Decreased command following  Safety/Judgement: Awareness of environment  Hearing: Auditory  Auditory Impairment: Hard of hearing, left side  Skin:  Age appropriate  Edema: No edema noted during assessment  Range Of Motion:  AROM: Generally decreased, functional           PROM: Generally decreased, functional           Strength:    Strength: Generally decreased, functional                    Tone & Sensation:   Tone: Normal                              Coordination:  Coordination: Generally decreased, functional  Vision:   Acuity: Able to read clock/calendar on wall without difficulty  Functional Mobility:  Bed Mobility:  Rolling: Contact guard assistance  Supine to Sit: Stand-by asssistance  Sit to Supine: Contact guard assistance  Scooting: Contact guard assistance  Transfers:  Sit to Stand: Contact guard assistance  Stand to Sit: Stand-by asssistance  Stand Pivot Transfers: Contact guard assistance  Stand Pivot Transfers: Minimal assistance  Bed to Chair: Contact guard assistance              Balance:   Sitting: Intact; Without support  Standing: Impaired; With support  Standing - Static: Fair;Constant support  Standing - Dynamic : Poor  Ambulation/Gait Training:  Distance (ft): 10 Feet (ft)  Assistive Device: Gait belt (HHA)  Ambulation - Level of Assistance: Contact guard assistance     Gait Description (WDL): Exceptions to WDL  Gait Abnormalities: Ataxic;Decreased step clearance; Path deviations        Base of Support: Narrowed     Speed/Hoa: Accelerated                                              Stairs:      Not done during this Rx session.                     Therapeutic Exercises:   Not done during this Rx session     Functional Measure:  Barthel Index:      Bathin  Bladder: 5  Bowels: 5  Groomin  Dressin  Feeding: 10  Mobility: 10  Stairs: 5  Toilet Use: 5  Transfer (Bed to Chair and Back): 10  Total: 55         Barthel and G-code impairment scale:  Percentage of impairment CH  0% CI  1-19% CJ  20-39% CK  40-59% CL  60-79% CM  80-99% CN  100%   Barthel Score 0-100 100 99-80 79-60 59-40 20-39 1-19    0   Barthel Score 0-20 20 17-19 13-16 9-12 5-8 1-4 0      The Barthel ADL Index: Guidelines  1. The index should be used as a record of what a patient does, not as a record of what a patient could do. 2. The main aim is to establish degree of independence from any help, physical or verbal, however minor and for whatever reason. 3. The need for supervision renders the patient not independent. 4. A patient's performance should be established using the best available evidence. Asking the patient, friends/relatives and nurses are the usual sources, but direct observation and common sense are also important. However direct testing is not needed. 5. Usually the patient's performance over the preceding 24-48 hours is important, but occasionally longer periods will be relevant. 6. Middle categories imply that the patient supplies over 50 per cent of the effort. 7. Use of aids to be independent is allowed. Peggy Wooten., Barthel, D.W. (3814). Functional evaluation: the Barthel Index. [address] [facility] (14)2. Irma Johnson ipetro Jasvir, KARUNAJSELVIN, Juve Alcantara., Nat Martinez, Esteban Patricia, [address] [facility] (1999). Measuring the change indisability after inpatient rehabilitation; comparison of the responsiveness of the Barthel Index and Functional [place] Measure. Journal of Neurology, Neurosurgery, and Psychiatry, 66(4), 105-742. Mary Amaya, N.J.A, LYLA Rojas, & Angel Acevedo, MMilanA. (2004.) Assessment of post-stroke quality of life in cost-effectiveness studies: The usefulness of the Barthel Index and the EuroQoL-5D. Quality of Life Research, 13, 632-73         G codes: In compliance with CMSs Claims Based Outcome Reporting, the following G-code set was chosen for this patient based on their primary functional limitation being treated:      The outcome measure chosen to determine the severity of the functional limitation was the Barthel Index with a score of 55/100 which was correlated with the impairment scale. · Mobility - Walking and Moving Around:               - CURRENT STATUS:    CK - 40%-59% impaired, limited or restricted               - GOAL STATUS:           CJ - 20%-39% impaired, limited or restricted               - D/C STATUS:                       ---------------To be determined---------------      Physical Therapy Evaluation Charge Determination   History Examination Presentation Decision-Making   MEDIUM  Complexity : 1-2 comorbidities / personal factors will impact the outcome/ POC  MEDIUM Complexity : 3 Standardized tests and measures addressing body structure, function, activity limitation and / or participation in recreation  MEDIUM Complexity : Evolving with changing characteristics  MEDIUM Complexity : FOTO score of 26-74      Based on the above components, the patient evaluation is determined to be of the following complexity level: MEDIUM     Pain:  Pain Scale 1: Numeric (0 - 10)  Pain Intensity 1: 4  Pain Location 1: Back     Pain Description 1: Aching  Pain Intervention(s) 1: Medication (see MAR)  Activity Tolerance:   Pt able to participate but safety is limited d/t confusion  Please refer to the flowsheet for vital signs taken during this treatment. After treatment:   [ ]         Patient left in no apparent distress sitting up in chair  [X]         Patient left in no apparent distress in bed  [X]         Call bell left within reach  [X]         Nursing notified  [ ]         Caregiver present  [ ]         Bed alarm activated      COMMUNICATION/EDUCATION:   The patients plan of care was discussed with: Registered Nurse. [ ]         Fall prevention education was provided and the patient/caregiver indicated understanding. [ ]         Patient/family have participated as able in goal setting and plan of care.   [ ]         Patient/family agree to work toward stated goals and plan of care. [ ]         Patient understands intent and goals of therapy, but is neutral about his/her participation. [X]         Patient is unable to participate in goal setting and plan of care.      Thank you for this referral.  Angella Garcia, PT   Time Calculation: 25 mins

## 2017-07-21 NOTE — CDMP QUERY
There is noted documentation of : AMS on known dementia for this patient. Could this be further specified as:     =>Acute Metabolic Encephalopathy POA in the setting of NIMCO; confusion, visual hallucinations and agitation; treated with haldol, effexor increased safety level  Or   => Acute Delirium on Known Dementia  =>Other Explanation of clinical findings  =>Unable to Determine (no explanation of clinical findings)    The medical record reflects the following clinical findings, treatment, and risk factors:    Risk Factors: Dementia, NIMCO POA    Clinical Indicators: ED note states:  \" Pt has reportedly been significantly more confused and ambulating less over the past couple days. Family claims pt has been having hallucinations, visualizing  relatives & Patient in room yelling, incomprehensible words, and restless at this time\"     Treatment: Haldol, seroquel, increased safety level, fall precautions    Please clarify and document your clinical opinion in the progress notes and discharge summary including the definitive and/or presumptive diagnosis, (suspected or probable), related to the above clinical findings. Please include clinical findings supporting your diagnosis.     Thank you,  Nidia Ballesteros, MSN, [address] [place] [address]

## 2017-07-21 NOTE — PROGRESS NOTES
Daily Progress Note: 7/21/2017  Maria Fernanda Crump MD    Assessment/Plan:   NIMCO (acute kidney injury): recurrent, severe. Cr up to 3.62; was 2.7 at discharge lasat admission. Of note, her Cr was 1.37 in Sept 2016. Workup during last admission was unremarkable (HCV, complements, FLC ratio, etc). Improving with time/hydration. -- IVF as needed  -- avoid nephrotoxins, renally dose meds -- gabapentin   -- nephrology following      Hyperkalemia: due to NIMCO and K supplements. No EKG changes  -- Kayexalate given at admission  -- monitor/recheck       Altered mental status on known dementia: with generalized weakness. Daughter also noted facial droop and slurring of speech earlier today. Reviewed prior notes from neurology and psychiatry, who thought underlying dementia.  Confounded by delirium and underlying dementia  -- hold gabapentin      Depression: with underlying dementia  -- cont Effexor but decrease dose due to NIMCO / severe renal insufficiency  -- hold Wellbutrin  -- cont trazodone       Hypothyroidism (): recent TSH WNR  -- cont LT4       Autoimmune hemolytic anemia (HCC) (): no active hemolysis  -- follow Hg       History of Pulmonary embolism: off anticoagulation         Code Status: DNR, d/w family      Surrogate decision maker: daughter     Problem List:  Problem List as of 7/21/2017  Date Reviewed: 7/2/2017          Codes Class Noted - Resolved    Hyperkalemia ICD-10-CM: E87.5  ICD-9-CM: 276.7  7/17/2017 - Present        Altered mental status ICD-10-CM: R41.82  ICD-9-CM: 780.97  7/17/2017 - Present        UTI (urinary tract infection) ICD-10-CM: N39.0  ICD-9-CM: 599.0  7/2/2017 - Present        Acute respiratory failure ([facility] ICD-10-CM: J96.00  ICD-9-CM: 518.81  12/21/2014 - Present        Pulmonary embolism ([facility] ICD-10-CM: I26.99  ICD-9-CM: 415.19  12/11/2014 - Present        NIMCO (acute kidney injury) ([facility] ICD-10-CM: N17.9  ICD-9-CM: 584.9  12/11/2014 - Present        Renal lesion ICD-10-CM: N28.9  ICD-9-CM: 593.9  11/5/2014 - Present        Autoimmune hemolytic anemia (HCC) ICD-10-CM: D59.1  ICD-9-CM: 283.0  11/5/2014 - Present        Leukocytosis ICD-10-CM: E98.003  ICD-9-CM: 288.60  10/25/2014 - Present        HTN (hypertension) ICD-10-CM: I10  ICD-9-CM: 401.9  10/19/2014 - Present        Depression ICD-10-CM: F32.9  ICD-9-CM: 671  10/19/2014 - Present        Hypothyroidism ICD-10-CM: E03.9  ICD-9-CM: 244.9  10/19/2014 - Present        Diarrhea ICD-10-CM: R19.7  ICD-9-CM: 787.91  10/19/2014 - Present        Leukopenia ICD-10-CM: D72.819  ICD-9-CM: 288.50  10/19/2014 - Present        Anemia ICD-10-CM: D64.9  ICD-9-CM: 285.9  10/19/2014 - Present        Transaminitis ICD-10-CM: R74.0  ICD-9-CM: 790.4  10/19/2014 - Present        Hyperbilirubinemia ICD-10-CM: E80.6  ICD-9-CM: 782.4  10/19/2014 - Present        Weakness ICD-10-CM: R53.1  ICD-9-CM: 780.79  10/18/2014 - Present              Subjective:    80 y.o. female w/ hx of HTN, hypothyroidism, depression, prior PE, dementia who presents with weakness and AMS. History provided by daughter at bedside. Pt was recently admitted for NIMCO and found to have UTI. She was d/c'd to SNF and in the last couple of days, family reports severe generalized weakness and worsening confusion. She was unable to ambulate at all. Daughter notes marked worsening of mentation (from 2/10 confusion to 10/10). Hallucinating visually. No fevers or chills. Has had poor PO intake. Family expresses frustration of care provided at [facility]. ED workup showed NIMCO (Cr up to 3.62; was 2.7 at discharge) and hyperkalemia (6.1). Pt is on K supplements. Ms. Mahesh Avendano is admitted for further evaluation and management of NIMCO with hyperkalemia ( Dr Thapa )    7/18:  Sleepy but will arouse and say a few words. She does know she is in a hospital but not which one. No complaints at this time. K+ was high and received lactulose po.     7/19:  Awake and alert.   Oriented to person but not place at this time. No complaints and no pain she reports. AM labs pending.     :  Little change. Potassium now normal.  Creat grad improving. Cultures neg so far. She will need SNF for rehab.     :  She seems more alert today. Creat continues to improve. Cultures negative. Cont PT/OT. Needs SNF for rehab. Review of Systems:   Review of systems not obtained due to patient factors. Objective:   Physical Exam:     Visit Vitals    /76 (BP 1 Location: Right arm, BP Patient Position: At rest)    Pulse 79    Temp 98.8 °F (37.1 °C)    Resp 18    Ht 5' 3\" (1.6 m)    Wt 70.3 kg (155 lb)    SpO2 97%    BMI 27.46 kg/m2      O2 Device: Room air    Temp (24hrs), Av.4 °F (36.9 °C), Min:97.8 °F (36.6 °C), Max:98.8 °F (37.1 °C)         0701 -  1900  In: -   Out: 9324 [[id number]]    General:  Elderly, frail. NAD, appears stated age. Head:  Normocephalic, without obvious abnormality, atraumatic. Eyes:  Conjunctivae/corneas clear. EOMs intact. Nose: Nares normal. Septum midline. Mucosa normal. No drainage or sinus tenderness. Throat: Lips, mucosa, and tongue moist..   Neck: Supple, symmetrical, trachea midline, no adenopathy, thyroid: no enlargement/tenderness/nodules, no carotid bruit and no JVD. Back:   Symmetric, no curvature. ROM normal. No CVA tenderness. Lungs:   Clear to auscultation bilaterally. Chest wall:  No tenderness or deformity. Heart:  Regular rate and rhythm, S1, S2 normal, no murmur, click, rub or gallop. Abdomen:   Soft, non-tender. Bowel sounds normal. No masses,  No organomegaly. Extremities: no cyanosis or edema. No calf tenderness or cords. Pulses: 2+ and symmetric all extremities. Skin: Skin color, texture, turgor normal. No rashes or lesions   Neurologic:  Alert and oriented X 1 and at occas x2. Often does not follow commands. No cogwheeling or rigidity. Gait not tested at this time. Sensation grossly normal to touch.   Fany Johansen motor of extremities normal.       Data Review:       Recent Days:  Recent Labs      07/21/17   0436  07/20/17   0449  07/19/17   1044   WBC  7.6  6.3  7.0   HGB  10.5*  10.2*  10.7*   HCT  32.3*  32.6*  34.3*   PLT  339  332  350     Recent Labs      07/21/17   0436  07/20/17   0449  07/19/17   1044   NA  141  140  140   K  3.7  4.0  4.3   CL  109*  106  106   CO2  23  23  26   GLU  86  77  81   BUN  30*  37*  40*   CREA  2.85*  3.03*  3.26*   CA  8.1*  8.5  8.2*   MG  1.8  2.1  2.2   ALB  2.6*  2.7*  2.8*   TBILI  0.2  0.2  0.3   SGOT  13*  17  18   ALT  13  14  16     No results for input(s): PH, PCO2, PO2, HCO3, FIO2 in the last 72 hours. 24 Hour Results:  Recent Results (from the past 24 hour(s))   MAGNESIUM    Collection Time: 07/21/17  4:36 AM   Result Value Ref Range    Magnesium 1.8 1.6 - 2.4 mg/dL   CBC WITH AUTOMATED DIFF    Collection Time: 07/21/17  4:36 AM   Result Value Ref Range    WBC 7.6 3.6 - 11.0 K/uL    RBC 3.68 (L) 3.80 - 5.20 M/uL    HGB 10.5 (L) 11.5 - 16.0 g/dL    HCT 32.3 (L) 35.0 - 47.0 %    MCV 87.8 80.0 - 99.0 FL    MCH 28.5 26.0 - 34.0 PG    MCHC 32.5 30.0 - 36.5 g/dL    RDW 14.7 (H) 11.5 - 14.5 %    PLATELET 340 216 - 250 K/uL    NEUTROPHILS 64 32 - 75 %    LYMPHOCYTES 21 12 - 49 %    MONOCYTES 11 5 - 13 %    EOSINOPHILS 4 0 - 7 %    BASOPHILS 0 0 - 1 %    ABS. NEUTROPHILS 5.0 1.8 - 8.0 K/UL    ABS. LYMPHOCYTES 1.6 0.8 - 3.5 K/UL    ABS. MONOCYTES 0.8 0.0 - 1.0 K/UL    ABS. EOSINOPHILS 0.3 0.0 - 0.4 K/UL    ABS.  BASOPHILS 0.0 0.0 - 0.1 K/UL   METABOLIC PANEL, COMPREHENSIVE    Collection Time: 07/21/17  4:36 AM   Result Value Ref Range    Sodium 141 136 - 145 mmol/L    Potassium 3.7 3.5 - 5.1 mmol/L    Chloride 109 (H) 97 - 108 mmol/L    CO2 23 21 - 32 mmol/L    Anion gap 9 5 - 15 mmol/L    Glucose 86 65 - 100 mg/dL    BUN 30 (H) 6 - 20 MG/DL    Creatinine 2.85 (H) 0.55 - 1.02 MG/DL    BUN/Creatinine ratio 11 (L) 12 - 20      GFR est AA 19 (L) >60 ml/min/1.73m2    GFR est non-AA 16 (L) >60 ml/min/1.73m2    Calcium 8.1 (L) 8.5 - 10.1 MG/DL    Bilirubin, total 0.2 0.2 - 1.0 MG/DL    ALT (SGPT) 13 12 - 78 U/L    AST (SGOT) 13 (L) 15 - 37 U/L    Alk. phosphatase 128 (H) 45 - 117 U/L    Protein, total 5.9 (L) 6.4 - 8.2 g/dL    Albumin 2.6 (L) 3.5 - 5.0 g/dL    Globulin 3.3 2.0 - 4.0 g/dL    A-G Ratio 0.8 (L) 1.1 - 2.2         Medications reviewed  Current Facility-Administered Medications   Medication Dose Route Frequency    levothyroxine (SYNTHROID) tablet 50 mcg  50 mcg Oral ACB    traZODone (DESYREL) tablet 25 mg  25 mg Oral QHS    venlafaxine-SR (EFFEXOR-XR) capsule 150 mg  150 mg Oral DAILY    heparin (porcine) injection 5,000 Units  5,000 Units SubCUTAneous Q8H    traMADol (ULTRAM) tablet 50 mg  50 mg Oral Q6H PRN    sodium chloride (NS) flush 5-10 mL  5-10 mL IntraVENous Q8H    sodium chloride (NS) flush 5-10 mL  5-10 mL IntraVENous PRN    naloxone (NARCAN) injection 0.4 mg  0.4 mg IntraVENous PRN    0.9% sodium chloride infusion  75 mL/hr IntraVENous CONTINUOUS    QUEtiapine (SEROquel) tablet 50 mg  50 mg Oral QHS PRN       Care Plan discussed with: Patient and Nurse    Total time spent with patient: 30 minutes.     Maury Coleman MD

## 2017-07-21 NOTE — PROGRESS NOTES
Spiritual Care Partner Volunteer visited patient in   on   Documented by:  Chaplain Lucio MDiv, MS, [facility]  [address] ([phone])Spiritual Care Partner Volunteer visited patient in  523 on 7/21/17.   Documented by:  Chaplain Lucio MDiv, MS, [facility]  [address] ([phone])

## 2017-07-21 NOTE — PROGRESS NOTES
[address] [place] [address]  YOB: 1932          Assessment & Plan:   ARF on CKD4  · Felt secondary to IVVD  · Improving with IVF  · Stop IVF. Encourage PO. Follow. Hyperkalemia  · Resolved    Dementia with AMS    Anorexia  · ?better per pt       Subjective:   CC: f/u ARF  HPI: Renal function improving  ROS: no sob/n/v  Current Facility-Administered Medications   Medication Dose Route Frequency    levothyroxine (SYNTHROID) tablet 50 mcg  50 mcg Oral ACB    traZODone (DESYREL) tablet 25 mg  25 mg Oral QHS    venlafaxine-SR (EFFEXOR-XR) capsule 150 mg  150 mg Oral DAILY    heparin (porcine) injection 5,000 Units  5,000 Units SubCUTAneous Q8H    traMADol (ULTRAM) tablet 50 mg  50 mg Oral Q6H PRN    sodium chloride (NS) flush 5-10 mL  5-10 mL IntraVENous Q8H    sodium chloride (NS) flush 5-10 mL  5-10 mL IntraVENous PRN    naloxone (NARCAN) injection 0.4 mg  0.4 mg IntraVENous PRN    0.9% sodium chloride infusion  75 mL/hr IntraVENous CONTINUOUS    QUEtiapine (SEROquel) tablet 50 mg  50 mg Oral QHS PRN          Objective:     Vitals:  Blood pressure 157/72, pulse 78, temperature 98.7 °F (37.1 °C), resp. rate 18, height 5' 3\" (1.6 m), weight 70.3 kg (155 lb), SpO2 98 %. Temp (24hrs), Av.4 °F (36.9 °C), Min:97.8 °F (36.6 °C), Max:98.8 °F (37.1 °C)      Intake and Output:      1901 -  0700  In: -   Out: 1200 [Urine:1200]    Physical Exam:               GENERAL ASSESSMENT: NAD  CHEST: CTA  HEART: S1S2  ABDOMEN: Soft,NT,  EXTREMITY: no EDEMA          ECG/rhythm:    Data Review      No results for input(s): TNIPOC in the last 72 hours. No lab exists for component: ITNL   No results for input(s): CPK, CKMB, TROIQ in the last 72 hours.   Recent Labs      17   0436  17   0449  17   1044   NA  141  140  140   K  3.7  4.0  4.3   CL  109*  106  106   CO2  23  23  26   BUN  30*  37*  40*   CREA  2.85*  3.03*  3.26* GLU  86  77  81   MG  1.8  2.1  2.2   CA  8.1*  8.5  8.2*   ALB  2.6*  2.7*  2.8*   WBC  7.6  6.3  7.0   HGB  10.5*  10.2*  10.7*   HCT  32.3*  32.6*  34.3*   PLT  339  332  350      No results for input(s): INR, PTP, APTT in the last 72 hours. No lab exists for component: INREXT, INREXT  Needs: urine analysis, urine sodium, protein and creatinine  Lab Results   Component Value Date/Time    Sodium urine, random 55 07/02/2017 02:41 AM    Creatinine, urine 187.78 12/21/2014 03:50 PM           : Sudhakar Alarcon MD  7/21/2017        [facility]:  [email or web address]  [place] office:  [address], [address] [place] [address],[address]  [address]  Phone: [phone]  Fax :     [phone]    [place] office:  [address] [address] [address]  Phone - [phone]  Fax - [phone]

## 2017-07-21 NOTE — PROGRESS NOTES
Problem: Falls - Risk of  Goal: *Absence of falls  Outcome: Progressing Towards Goal  Pt free from injury. Fall precautions in place.

## 2017-07-21 NOTE — PROGRESS NOTES
Spoke with pt's ROLANDO Yoo. She is aware that pt was not accepted by [facility]. She was [facility] and that is her first choice for rehab. She also plans on touring [address] [place] [address] as back-up facilities and is agreeable to sending referrals to all three facilities.   Je Stout, MICHELE

## 2017-07-21 NOTE — PROGRESS NOTES
Bedside and Verbal shift change report given to [address] (oncoming nurse) by Saundra Esquivel (offgoing nurse). Report included the following information SBAR, Kardex, ED Summary, Intake/Output and MAR.

## 2017-07-22 LAB
ALBUMIN SERPL BCP-MCNC: 2.8 G/DL (ref 3.5–5)
ALBUMIN/GLOB SERPL: 0.8 {RATIO} (ref 1.1–2.2)
ALP SERPL-CCNC: 135 U/L (ref 45–117)
ALT SERPL-CCNC: 12 U/L (ref 12–78)
ANION GAP BLD CALC-SCNC: 12 MMOL/L (ref 5–15)
AST SERPL W P-5'-P-CCNC: 15 U/L (ref 15–37)
BASOPHILS # BLD AUTO: 0 K/UL (ref 0–0.1)
BASOPHILS # BLD: 0 % (ref 0–1)
BILIRUB SERPL-MCNC: 0.2 MG/DL (ref 0.2–1)
BUN SERPL-MCNC: 27 MG/DL (ref 6–20)
BUN/CREAT SERPL: 9 (ref 12–20)
CALCIUM SERPL-MCNC: 8.4 MG/DL (ref 8.5–10.1)
CHLORIDE SERPL-SCNC: 107 MMOL/L (ref 97–108)
CO2 SERPL-SCNC: 22 MMOL/L (ref 21–32)
CREAT SERPL-MCNC: 2.89 MG/DL (ref 0.55–1.02)
EOSINOPHIL # BLD: 0.3 K/UL (ref 0–0.4)
EOSINOPHIL NFR BLD: 5 % (ref 0–7)
ERYTHROCYTE [DISTWIDTH] IN BLOOD BY AUTOMATED COUNT: 14.8 % (ref 11.5–14.5)
GLOBULIN SER CALC-MCNC: 3.5 G/DL (ref 2–4)
GLUCOSE SERPL-MCNC: 107 MG/DL (ref 65–100)
HCT VFR BLD AUTO: 32 % (ref 35–47)
HGB BLD-MCNC: 10.5 G/DL (ref 11.5–16)
LYMPHOCYTES # BLD AUTO: 22 % (ref 12–49)
LYMPHOCYTES # BLD: 1.6 K/UL (ref 0.8–3.5)
MAGNESIUM SERPL-MCNC: 1.7 MG/DL (ref 1.6–2.4)
MCH RBC QN AUTO: 28.5 PG (ref 26–34)
MCHC RBC AUTO-ENTMCNC: 32.8 G/DL (ref 30–36.5)
MCV RBC AUTO: 87 FL (ref 80–99)
MONOCYTES # BLD: 0.8 K/UL (ref 0–1)
MONOCYTES NFR BLD AUTO: 11 % (ref 5–13)
NEUTS SEG # BLD: 4.6 K/UL (ref 1.8–8)
NEUTS SEG NFR BLD AUTO: 62 % (ref 32–75)
PLATELET # BLD AUTO: 298 K/UL (ref 150–400)
POTASSIUM SERPL-SCNC: 3.8 MMOL/L (ref 3.5–5.1)
PROT SERPL-MCNC: 6.3 G/DL (ref 6.4–8.2)
RBC # BLD AUTO: 3.68 M/UL (ref 3.8–5.2)
SODIUM SERPL-SCNC: 141 MMOL/L (ref 136–145)
WBC # BLD AUTO: 7.3 K/UL (ref 3.6–11)

## 2017-07-22 PROCEDURE — 74011250637 HC RX REV CODE- 250/637: Performed by: INTERNAL MEDICINE

## 2017-07-22 PROCEDURE — 74011250636 HC RX REV CODE- 250/636: Performed by: INTERNAL MEDICINE

## 2017-07-22 PROCEDURE — 65660000000 HC RM CCU STEPDOWN

## 2017-07-22 PROCEDURE — 85025 COMPLETE CBC W/AUTO DIFF WBC: CPT | Performed by: FAMILY MEDICINE

## 2017-07-22 PROCEDURE — 36415 COLL VENOUS BLD VENIPUNCTURE: CPT | Performed by: FAMILY MEDICINE

## 2017-07-22 PROCEDURE — 74011250637 HC RX REV CODE- 250/637: Performed by: FAMILY MEDICINE

## 2017-07-22 PROCEDURE — 80053 COMPREHEN METABOLIC PANEL: CPT | Performed by: FAMILY MEDICINE

## 2017-07-22 PROCEDURE — 83735 ASSAY OF MAGNESIUM: CPT | Performed by: FAMILY MEDICINE

## 2017-07-22 RX ADMIN — LEVOTHYROXINE SODIUM 50 MCG: 50 TABLET ORAL at 06:57

## 2017-07-22 RX ADMIN — Medication 10 ML: at 14:00

## 2017-07-22 RX ADMIN — HEPARIN SODIUM 5000 UNITS: 5000 INJECTION, SOLUTION INTRAVENOUS; SUBCUTANEOUS at 16:53

## 2017-07-22 RX ADMIN — TRAZODONE HYDROCHLORIDE 25 MG: 50 TABLET ORAL at 22:30

## 2017-07-22 RX ADMIN — VENLAFAXINE HYDROCHLORIDE 150 MG: 150 CAPSULE, EXTENDED RELEASE ORAL at 09:45

## 2017-07-22 RX ADMIN — HEPARIN SODIUM 5000 UNITS: 5000 INJECTION, SOLUTION INTRAVENOUS; SUBCUTANEOUS at 05:46

## 2017-07-22 RX ADMIN — HEPARIN SODIUM 5000 UNITS: 5000 INJECTION, SOLUTION INTRAVENOUS; SUBCUTANEOUS at 22:30

## 2017-07-22 RX ADMIN — TRAMADOL HYDROCHLORIDE 50 MG: 50 TABLET, FILM COATED ORAL at 09:45

## 2017-07-22 NOTE — PROGRESS NOTES
Bedside and Verbal shift change report given to Elpidio Louis RN (oncoming nurse) by Mayela Alfaro RN (offgoing nurse). Report included the following information SBAR, Kardex, Procedure Summary, Intake/Output and MAR. Bedside and Verbal shift change report given to Kar Rush RN (oncoming nurse) by Elpidio Louis RN (offgoing nurse). Report included the following information SBAR, Kardex, Procedure Summary, Intake/Output and MAR.

## 2017-07-22 NOTE — PROGRESS NOTES
Bedside and Verbal shift change report given to PROMISE Mayers (oncoming nurse) by PROMISE Ellis (offgoing nurse). Report included the following information SBAR, Kardex and MAR.

## 2017-07-22 NOTE — PROGRESS NOTES
Daily Progress Note: 7/22/2017  Maria Fernanda Piper-Kaylan Lockwood MD    Assessment/Plan:   NIMCO (acute kidney injury): recurrent, severe. Cr up to 3.62; was 2.7 at discharge lasat admission. Of note, her Cr was 1.37 in Sept 2016. Workup during last admission was unremarkable (HCV, complements, FLC ratio, etc). Improving with time/hydration. -- avoid nephrotoxins, renally dose meds -- gabapentin   -- nephrology following  --cr stable 2.8      Hyperkalemia: due to NIMCO and K supplements. resolved  -- Kayexalate given at admission  -- follow BMP       Altered mental status on known dementia: with generalized weakness. Confounded by delirium and underlying dementia  -- hold gabapentin      Depression: with underlying dementia  -- cont Effexor at decrease dose due to NIMCO / severe renal insufficiency- consistent dosing of this everyday will help her more than an increased dose.   -- hold Wellbutrin  -- cont trazodone       Hypothyroidism (): recent TSH WNR  -- cont LT4       Autoimmune hemolytic anemia (HCC) (): no active hemolysis  -- trend Hgbs  --stabel at 10.5     History of Pulmonary embolism: off anticoagulation     Generalized debility: due to chronic diseases  --PT/OT rec return to SNF, awaiting placement     Code Status: DNR, d/w family      Surrogate decision maker: daughter in law    Dispo: to [facility] or other SNF when family makes decision and bed available - likely Monday     Problem List:  Problem List as of 7/22/2017  Date Reviewed: 7/2/2017          Codes Class Noted - Resolved    Hyperkalemia ICD-10-CM: E87.5  ICD-9-CM: 276.7  7/17/2017 - Present        Altered mental status ICD-10-CM: R41.82  ICD-9-CM: 780.97  7/17/2017 - Present        UTI (urinary tract infection) ICD-10-CM: N39.0  ICD-9-CM: 599.0  7/2/2017 - Present        Acute respiratory failure ([facility] ICD-10-CM: J96.00  ICD-9-CM: 518.81  12/21/2014 - Present        Pulmonary embolism ([facility] ICD-10-CM: I26.99  ICD-9-CM: 415.19 12/11/2014 - Present        NIMCO (acute kidney injury) ([facility] ICD-10-CM: N17.9  ICD-9-CM: 584.9  12/11/2014 - Present        Renal lesion ICD-10-CM: N28.9  ICD-9-CM: 593.9  11/5/2014 - Present        Autoimmune hemolytic anemia (HCC) ICD-10-CM: D59.1  ICD-9-CM: 283.0  11/5/2014 - Present        Leukocytosis ICD-10-CM: D72.829  ICD-9-CM: 288.60  10/25/2014 - Present        HTN (hypertension) ICD-10-CM: I10  ICD-9-CM: 401.9  10/19/2014 - Present        Depression ICD-10-CM: F32.9  ICD-9-CM: 218  10/19/2014 - Present        Hypothyroidism ICD-10-CM: E03.9  ICD-9-CM: 244.9  10/19/2014 - Present        Diarrhea ICD-10-CM: R19.7  ICD-9-CM: 787.91  10/19/2014 - Present        Leukopenia ICD-10-CM: D72.819  ICD-9-CM: 288.50  10/19/2014 - Present        Anemia ICD-10-CM: D64.9  ICD-9-CM: 285.9  10/19/2014 - Present        Transaminitis ICD-10-CM: R74.0  ICD-9-CM: 790.4  10/19/2014 - Present        Hyperbilirubinemia ICD-10-CM: E80.6  ICD-9-CM: 782.4  10/19/2014 - Present        Weakness ICD-10-CM: R53.1  ICD-9-CM: 780.79  10/18/2014 - Present              Subjective:    80 y.o. female w/ hx of HTN, hypothyroidism, depression, prior PE, dementia who presents with weakness and AMS. History provided by daughter at bedside. Pt was recently admitted for NIMCO and found to have UTI. She was d/c'd to SNF and in the last couple of days, family reports severe generalized weakness and worsening confusion. She was unable to ambulate at all. Daughter notes marked worsening of mentation (from 2/10 confusion to 10/10). Hallucinating visually. No fevers or chills. Has had poor PO intake. Family expresses frustration of care provided at [facility]. ED workup showed NIMCO (Cr up to 3.62; was 2.7 at discharge) and hyperkalemia (6.1). Pt is on K supplements. Ms. Jocelyne Kingsley is admitted for further evaluation and management of NIMCO with hyperkalemia ( Dr Kika Johnson)    7/18:  Sleepy but will arouse and say a few words.   She does know she is in a hospital but not which one. No complaints at this time. K+ was high and received lactulose po.     :  Awake and alert. Oriented to person but not place at this time. No complaints and no pain she reports. AM labs pending.     :  Little change. Potassium now normal.  Creat grad improving. Cultures neg so far. She will need SNF for rehab.     :  She seems more alert today. Creat continues to improve. Cultures negative. Cont PT/OT. Needs SNF for rehab.     : Pt with not complaints this AM.  Recognized me as her PCP when I walked in. Tolerating PO, +BM yesterday. GERI notes that pt was tearful yesterday when she thought she was going home. Wondering if she needs increase in effexor. I discussed with GERI that pt is always tearful in my office, no matter what her effexor dose is. She was also missing it quite often and c/o dizziness. Pt is very sensitive about wanting to stay in her home environment and not live in a nursing home. For over a year now, I have been trying to lead pt and family in the direction of living in [place] or NH as pt is requiring more care. GERI was very receptive to my concerns. Liked [facility], but wants to tour a few more facilities. WIll be ready to make decision soon. Review of Systems:   Review of systems not obtained due to patient factors. Objective:   Physical Exam:     Visit Vitals    /84 (BP 1 Location: Right arm, BP Patient Position: At rest)    Pulse 81    Temp 98.3 °F (36.8 °C)    Resp 16    Ht 5' 3\" (1.6 m)    Wt 70.3 kg (155 lb)    SpO2 95%    BMI 27.46 kg/m2      O2 Device: Room air    Temp (24hrs), Av.4 °F (36.9 °C), Min:97.7 °F (36.5 °C), Max:98.8 °F (37.1 °C)             General:  Elderly, frail. NAD, appears stated age. Bright. Head:  Normocephalic, without obvious abnormality, atraumatic. Eyes:  Conjunctivae/corneas clear. EOMs intact. Nose: Nares normal. Septum midline. Mucosa normal. No drainage or sinus tenderness. Throat: Lips, mucosa, and tongue moist..   Neck: Supple, symmetrical, trachea midline, no adenopathy, thyroid: no enlargement/tenderness/nodules, no carotid bruit and no JVD. Back:   Symmetric, no curvature. ROM normal. No CVA tenderness. Lungs:   Clear to auscultation bilaterally. Chest wall:  No tenderness or deformity. Heart:  Regular rate and rhythm, S1, S2 normal, no murmur, click, rub or gallop. Abdomen:   Soft, non-tender. Bowel sounds normal. No masses,  No organomegaly. Extremities: no cyanosis or edema. No calf tenderness or cords. Pulses: 2+ and symmetric all extremities. Skin: Skin color, texture, turgor normal. No rashes or lesions   Neurologic:  Alert and oriented X 1 and at occas x2. Follows commands. No cogwheeling or rigidity. Gait not tested at this time. Sensation grossly normal to touch. Gross motor of extremities normal.       Data Review:       Recent Days:  Recent Labs      07/22/17   0234  07/21/17   0436  07/20/17   0449   WBC  7.3  7.6  6.3   HGB  10.5*  10.5*  10.2*   HCT  32.0*  32.3*  32.6*   PLT  298  339  332     Recent Labs      07/22/17   0234  07/21/17   0436  07/20/17   0449   NA  141  141  140   K  3.8  3.7  4.0   CL  107  109*  106   CO2  22  23  23   GLU  107*  86  77   BUN  27*  30*  37*   CREA  2.89*  2.85*  3.03*   CA  8.4*  8.1*  8.5   MG  1.7  1.8  2.1   ALB  2.8*  2.6*  2.7*   TBILI  0.2  0.2  0.2   SGOT  15  13*  17   ALT  12  13  14     No results for input(s): PH, PCO2, PO2, HCO3, FIO2 in the last 72 hours.     24 Hour Results:  Recent Results (from the past 24 hour(s))   MAGNESIUM    Collection Time: 07/22/17  2:34 AM   Result Value Ref Range    Magnesium 1.7 1.6 - 2.4 mg/dL   CBC WITH AUTOMATED DIFF    Collection Time: 07/22/17  2:34 AM   Result Value Ref Range    WBC 7.3 3.6 - 11.0 K/uL    RBC 3.68 (L) 3.80 - 5.20 M/uL    HGB 10.5 (L) 11.5 - 16.0 g/dL    HCT 32.0 (L) 35.0 - 47.0 %    MCV 87.0 80.0 - 99.0 FL    MCH 28.5 26.0 - 34.0 PG    MCHC 32.8 30.0 - 36.5 g/dL    RDW 14.8 (H) 11.5 - 14.5 %    PLATELET 078 913 - 435 K/uL    NEUTROPHILS 62 32 - 75 %    LYMPHOCYTES 22 12 - 49 %    MONOCYTES 11 5 - 13 %    EOSINOPHILS 5 0 - 7 %    BASOPHILS 0 0 - 1 %    ABS. NEUTROPHILS 4.6 1.8 - 8.0 K/UL    ABS. LYMPHOCYTES 1.6 0.8 - 3.5 K/UL    ABS. MONOCYTES 0.8 0.0 - 1.0 K/UL    ABS. EOSINOPHILS 0.3 0.0 - 0.4 K/UL    ABS. BASOPHILS 0.0 0.0 - 0.1 K/UL   METABOLIC PANEL, COMPREHENSIVE    Collection Time: 07/22/17  2:34 AM   Result Value Ref Range    Sodium 141 136 - 145 mmol/L    Potassium 3.8 3.5 - 5.1 mmol/L    Chloride 107 97 - 108 mmol/L    CO2 22 21 - 32 mmol/L    Anion gap 12 5 - 15 mmol/L    Glucose 107 (H) 65 - 100 mg/dL    BUN 27 (H) 6 - 20 MG/DL    Creatinine 2.89 (H) 0.55 - 1.02 MG/DL    BUN/Creatinine ratio 9 (L) 12 - 20      GFR est AA 19 (L) >60 ml/min/1.73m2    GFR est non-AA 16 (L) >60 ml/min/1.73m2    Calcium 8.4 (L) 8.5 - 10.1 MG/DL    Bilirubin, total 0.2 0.2 - 1.0 MG/DL    ALT (SGPT) 12 12 - 78 U/L    AST (SGOT) 15 15 - 37 U/L    Alk.  phosphatase 135 (H) 45 - 117 U/L    Protein, total 6.3 (L) 6.4 - 8.2 g/dL    Albumin 2.8 (L) 3.5 - 5.0 g/dL    Globulin 3.5 2.0 - 4.0 g/dL    A-G Ratio 0.8 (L) 1.1 - 2.2         Medications reviewed  Current Facility-Administered Medications   Medication Dose Route Frequency    levothyroxine (SYNTHROID) tablet 50 mcg  50 mcg Oral ACB    traZODone (DESYREL) tablet 25 mg  25 mg Oral QHS    venlafaxine-SR (EFFEXOR-XR) capsule 150 mg  150 mg Oral DAILY    heparin (porcine) injection 5,000 Units  5,000 Units SubCUTAneous Q8H    traMADol (ULTRAM) tablet 50 mg  50 mg Oral Q6H PRN    sodium chloride (NS) flush 5-10 mL  5-10 mL IntraVENous Q8H    sodium chloride (NS) flush 5-10 mL  5-10 mL IntraVENous PRN    naloxone (NARCAN) injection 0.4 mg  0.4 mg IntraVENous PRN    QUEtiapine (SEROquel) tablet 50 mg  50 mg Oral QHS PRN       Care Plan discussed with: Patient and Nurse    Total time spent with patient: 30 minutes.     Papo Huang MD

## 2017-07-22 NOTE — PROGRESS NOTES
Bedside and Verbal shift change report given to Alexander Crump Se (oncoming nurse) by Soila Miller (offgoing nurse).  Report included the following information

## 2017-07-22 NOTE — PROGRESS NOTES
Bedside and Verbal shift change report given to [id number] Crouch (oncoming nurse) by Shea Larson (offgoing nurse). Report included the following information SBAR, Kardex, Intake/Output, Accordion and Recent Results.

## 2017-07-23 LAB
ALBUMIN SERPL BCP-MCNC: 2.8 G/DL (ref 3.5–5)
ALBUMIN/GLOB SERPL: 0.8 {RATIO} (ref 1.1–2.2)
ALP SERPL-CCNC: 125 U/L (ref 45–117)
ALT SERPL-CCNC: 12 U/L (ref 12–78)
ANION GAP BLD CALC-SCNC: 10 MMOL/L (ref 5–15)
AST SERPL W P-5'-P-CCNC: 12 U/L (ref 15–37)
BACTERIA SPEC CULT: NORMAL
BASOPHILS # BLD AUTO: 0 K/UL (ref 0–0.1)
BASOPHILS # BLD: 0 % (ref 0–1)
BILIRUB SERPL-MCNC: 0.2 MG/DL (ref 0.2–1)
BUN SERPL-MCNC: 27 MG/DL (ref 6–20)
BUN/CREAT SERPL: 9 (ref 12–20)
CALCIUM SERPL-MCNC: 8.9 MG/DL (ref 8.5–10.1)
CHLORIDE SERPL-SCNC: 104 MMOL/L (ref 97–108)
CO2 SERPL-SCNC: 24 MMOL/L (ref 21–32)
CREAT SERPL-MCNC: 3.01 MG/DL (ref 0.55–1.02)
EOSINOPHIL # BLD: 0.3 K/UL (ref 0–0.4)
EOSINOPHIL NFR BLD: 4 % (ref 0–7)
ERYTHROCYTE [DISTWIDTH] IN BLOOD BY AUTOMATED COUNT: 14.9 % (ref 11.5–14.5)
GLOBULIN SER CALC-MCNC: 3.4 G/DL (ref 2–4)
GLUCOSE SERPL-MCNC: 100 MG/DL (ref 65–100)
HCT VFR BLD AUTO: 32.8 % (ref 35–47)
HGB BLD-MCNC: 10.7 G/DL (ref 11.5–16)
LYMPHOCYTES # BLD AUTO: 23 % (ref 12–49)
LYMPHOCYTES # BLD: 1.9 K/UL (ref 0.8–3.5)
MAGNESIUM SERPL-MCNC: 1.6 MG/DL (ref 1.6–2.4)
MCH RBC QN AUTO: 28.3 PG (ref 26–34)
MCHC RBC AUTO-ENTMCNC: 32.6 G/DL (ref 30–36.5)
MCV RBC AUTO: 86.8 FL (ref 80–99)
MONOCYTES # BLD: 0.8 K/UL (ref 0–1)
MONOCYTES NFR BLD AUTO: 9 % (ref 5–13)
NEUTS SEG # BLD: 5.3 K/UL (ref 1.8–8)
NEUTS SEG NFR BLD AUTO: 64 % (ref 32–75)
PLATELET # BLD AUTO: 306 K/UL (ref 150–400)
POTASSIUM SERPL-SCNC: 3.7 MMOL/L (ref 3.5–5.1)
PROT SERPL-MCNC: 6.2 G/DL (ref 6.4–8.2)
RBC # BLD AUTO: 3.78 M/UL (ref 3.8–5.2)
SERVICE CMNT-IMP: NORMAL
SODIUM SERPL-SCNC: 138 MMOL/L (ref 136–145)
WBC # BLD AUTO: 8.3 K/UL (ref 3.6–11)

## 2017-07-23 PROCEDURE — 85025 COMPLETE CBC W/AUTO DIFF WBC: CPT | Performed by: FAMILY MEDICINE

## 2017-07-23 PROCEDURE — 65270000029 HC RM PRIVATE

## 2017-07-23 PROCEDURE — 74011250636 HC RX REV CODE- 250/636: Performed by: INTERNAL MEDICINE

## 2017-07-23 PROCEDURE — 36415 COLL VENOUS BLD VENIPUNCTURE: CPT | Performed by: FAMILY MEDICINE

## 2017-07-23 PROCEDURE — 74011250637 HC RX REV CODE- 250/637: Performed by: INTERNAL MEDICINE

## 2017-07-23 PROCEDURE — 80053 COMPREHEN METABOLIC PANEL: CPT | Performed by: FAMILY MEDICINE

## 2017-07-23 PROCEDURE — 83735 ASSAY OF MAGNESIUM: CPT | Performed by: FAMILY MEDICINE

## 2017-07-23 RX ADMIN — VENLAFAXINE HYDROCHLORIDE 150 MG: 150 CAPSULE, EXTENDED RELEASE ORAL at 10:19

## 2017-07-23 RX ADMIN — LEVOTHYROXINE SODIUM 50 MCG: 50 TABLET ORAL at 07:30

## 2017-07-23 RX ADMIN — HEPARIN SODIUM 5000 UNITS: 5000 INJECTION, SOLUTION INTRAVENOUS; SUBCUTANEOUS at 05:25

## 2017-07-23 RX ADMIN — TRAZODONE HYDROCHLORIDE 25 MG: 50 TABLET ORAL at 22:45

## 2017-07-23 RX ADMIN — HEPARIN SODIUM 5000 UNITS: 5000 INJECTION, SOLUTION INTRAVENOUS; SUBCUTANEOUS at 22:45

## 2017-07-23 RX ADMIN — HEPARIN SODIUM 5000 UNITS: 5000 INJECTION, SOLUTION INTRAVENOUS; SUBCUTANEOUS at 13:29

## 2017-07-23 NOTE — ROUTINE PROCESS
Bedside and Verbal shift change report given to Jone Braun [facility] [place] (oncoming nurse) by Balta Sahu RN (offgoing nurse). Report included the following information SBAR, Kardex, ED Summary, Intake/Output, MAR and Recent Results.

## 2017-07-23 NOTE — PROGRESS NOTES
Bedside and Verbal shift change report given to Hailey Correa (oncoming nurse) by Kisha Vaca (offgoing nurse). Report included the following information SBAR, Kardex, MAR, Accordion, Recent Results and Cardiac Rhythm NSR.

## 2017-07-23 NOTE — PROGRESS NOTES
Daily Progress Note: 7/23/2017  Maria Fernanda Beltre MD    Assessment/Plan:   NIMCO (acute kidney injury): recurrent, severe. Cr up to 3.62; was 2.7 at discharge lasat admission. Of note, her Cr was 1.37 in Sept 2016. Workup during last admission was unremarkable (HCV, complements, FLC ratio, etc). Improving with time/hydration. -- avoid nephrotoxins, renally dose meds -- gabapentin   -- nephrology following  --cr up to 3  --encourage PO fluid intake      Hyperkalemia: due to NIMCO and K supplements. resolved  -- Kayexalate given at admission  -- follow BMP      Acute delirium on known dementia: with generalized weakness. Improving as she clinically improves  -- hold gabapentin      Depression: with underlying dementia  -- cont Effexor at decrease dose due to NIMCO / severe renal insufficiency- consistent dosing of this everyday will help her more than an increased dose.   -- hold Wellbutrin  -- cont trazodone       Hypothyroidism (): recent TSH WNR  -- cont LT4       Autoimmune hemolytic anemia (HCC) (): no active hemolysis  -- trend Hgbs  --stable at 10.7     History of Pulmonary embolism: off anticoagulation     Generalized debility: due to chronic diseases  --PT/OT rec return to SNF, awaiting placement     Code Status: DNR, d/w family      Surrogate decision maker: daughter in law    Dispo: to [facility] or other SNF when family makes decision and bed available - likely Monday     Problem List:  Problem List as of 7/23/2017  Date Reviewed: 7/2/2017          Codes Class Noted - Resolved    Hyperkalemia ICD-10-CM: E87.5  ICD-9-CM: 276.7  7/17/2017 - Present        Altered mental status ICD-10-CM: R41.82  ICD-9-CM: 780.97  7/17/2017 - Present        UTI (urinary tract infection) ICD-10-CM: N39.0  ICD-9-CM: 599.0  7/2/2017 - Present        Acute respiratory failure ([facility] ICD-10-CM: J96.00  ICD-9-CM: 518.81  12/21/2014 - Present        Pulmonary embolism ([facility] [address] [facility] ICD-10-CM: I26.99  ICD-9-CM: 415.19  12/11/2014 - Present        NIMCO (acute kidney injury) ([facility] [address] ICD-10-CM: N17.9  ICD-9-CM: 584.9  12/11/2014 - Present        Renal lesion ICD-10-CM: N28.9  ICD-9-CM: 593.9  11/5/2014 - Present        Autoimmune hemolytic anemia (HCC) ICD-10-CM: D59.1  ICD-9-CM: 283.0  11/5/2014 - Present        Leukocytosis ICD-10-CM: D72.829  ICD-9-CM: 288.60  10/25/2014 - Present        HTN (hypertension) ICD-10-CM: I10  ICD-9-CM: 401.9  10/19/2014 - Present        Depression ICD-10-CM: F32.9  ICD-9-CM: 809  10/19/2014 - Present        Hypothyroidism ICD-10-CM: E03.9  ICD-9-CM: 244.9  10/19/2014 - Present        Diarrhea ICD-10-CM: R19.7  ICD-9-CM: 787.91  10/19/2014 - Present        Leukopenia ICD-10-CM: D72.819  ICD-9-CM: 288.50  10/19/2014 - Present        Anemia ICD-10-CM: D64.9  ICD-9-CM: 285.9  10/19/2014 - Present        Transaminitis ICD-10-CM: R74.0  ICD-9-CM: 790.4  10/19/2014 - Present        Hyperbilirubinemia ICD-10-CM: E80.6  ICD-9-CM: 782.4  10/19/2014 - Present        Weakness ICD-10-CM: R53.1  ICD-9-CM: 780.79  10/18/2014 - Present              Subjective:    80 y.o. female w/ hx of HTN, hypothyroidism, depression, prior PE, dementia who presents with weakness and AMS. History provided by daughter at bedside. Pt was recently admitted for NIMCO and found to have UTI. She was d/c'd to SNF and in the last couple of days, family reports severe generalized weakness and worsening confusion. She was unable to ambulate at all. Daughter notes marked worsening of mentation (from 2/10 confusion to 10/10). Hallucinating visually. No fevers or chills. Has had poor PO intake. Family expresses frustration of care provided at [facility]. ED workup showed NIMCO (Cr up to 3.62; was 2.7 at discharge) and hyperkalemia (6.1). Pt is on K supplements. Ms. Isabell Rogers is admitted for further evaluation and management of NIMCO with hyperkalemia ( Dr Dasia Fuentes)    7/18:  Sleepy but will arouse and say a few words.   She does know she is in a hospital but not which one. No complaints at this time. K+ was high and received lactulose po.     :  Awake and alert. Oriented to person but not place at this time. No complaints and no pain she reports. AM labs pending.     :  Little change. Potassium now normal.  Creat grad improving. Cultures neg so far. She will need SNF for rehab.     :  She seems more alert today. Creat continues to improve. Cultures negative. Cont PT/OT. Needs SNF for rehab.     : Pt with not complaints this AM.  Recognized me as her PCP when I walked in. Tolerating PO, +BM yesterday. GERI notes that pt was tearful yesterday when she thought she was going home. Wondering if she needs increase in effexor. I discussed with GERI that pt is always tearful in my office, no matter what her effexor dose is. She was also missing it quite often and c/o dizziness. Pt is very sensitive about wanting to stay in her home environment and not live in a nursing home. For over a year now, I have been trying to lead pt and family in the direction of living in [place] or [place] as pt is requiring more care. GERI was very receptive to my concerns. Liked [facility], but wants to tour a few more facilities. WIll be ready to make decision soon. : Cr up as she is off IVF. Encouraged PO hydration. Pt denies emotional lability or feeling sad. Tolerating PO, +BM. Review of Systems:   Review of systems not obtained due to patient factors. Objective:   Physical Exam:     Visit Vitals    /66 (BP 1 Location: Right arm, BP Patient Position: At rest)    Pulse 74    Temp 98.6 °F (37 °C)    Resp 18    Ht 5' 3\" (1.6 m)    Wt 70.3 kg (155 lb)    SpO2 95%    BMI 27.46 kg/m2      O2 Device: Room air    Temp (24hrs), Av.5 °F (36.9 °C), Min:98.2 °F (36.8 °C), Max:98.7 °F (37.1 °C)         1901 -  0700  In: 12 [P.O.:560]  Out: 250 [Urine:250]    General:  Elderly, frail. NAD, appears stated age.   Bright, cheeful Head:  Normocephalic, without obvious abnormality, atraumatic. Eyes:  Conjunctivae/corneas clear. EOMs intact. Nose: Nares normal. Septum midline. Throat: Lips, mucosa, and tongue moist.   Neck: Supple, symmetrical, trachea midline, no adenopathy, thyroid: no enlargement/tenderness/nodules, no carotid bruit and no JVD. Back:   Symmetric, no curvature. ROM normal. No CVA tenderness. Lungs:   Clear to auscultation bilaterally. Chest wall:  No tenderness or deformity. Heart:  Regular rate and rhythm, S1, S2 normal, no murmur, click, rub or gallop. Abdomen:   Soft, non-tender. Bowel sounds normal. No masses,  No organomegaly. Extremities: no cyanosis or edema. No calf tenderness or cords. Pulses: 2+ and symmetric all extremities. Skin: Skin color, texture, turgor normal. No rashes or lesions   Neurologic:  Alert and oriented X 1 and at occas x2. Follows commands. No cogwheeling or rigidity. Gait not tested at this time. Sensation grossly normal to touch. Gross motor of extremities normal.       Data Review:       Recent Days:  Recent Labs      07/23/17   0252  07/22/17   0234  07/21/17   0436   WBC  8.3  7.3  7.6   HGB  10.7*  10.5*  10.5*   HCT  32.8*  32.0*  32.3*   PLT  306  298  339     Recent Labs      07/23/17   0252  07/22/17   0234  07/21/17   0436   NA  138  141  141   K  3.7  3.8  3.7   CL  104  107  109*   CO2  24  22  23   GLU  100  107*  86   BUN  27*  27*  30*   CREA  3.01*  2.89*  2.85*   CA  8.9  8.4*  8.1*   MG  1.6  1.7  1.8   ALB  2.8*  2.8*  2.6*   TBILI  0.2  0.2  0.2   SGOT  12*  15  13*   ALT  12  12  13     No results for input(s): PH, PCO2, PO2, HCO3, FIO2 in the last 72 hours.     24 Hour Results:  Recent Results (from the past 24 hour(s))   CBC WITH AUTOMATED DIFF    Collection Time: 07/23/17  2:52 AM   Result Value Ref Range    WBC 8.3 3.6 - 11.0 K/uL    RBC 3.78 (L) 3.80 - 5.20 M/uL    HGB 10.7 (L) 11.5 - 16.0 g/dL    HCT 32.8 (L) 35.0 - 47.0 %    MCV 86.8 80.0 - 99.0 FL    MCH 28.3 26.0 - 34.0 PG    MCHC 32.6 30.0 - 36.5 g/dL    RDW 14.9 (H) 11.5 - 14.5 %    PLATELET 269 149 - 787 K/uL    NEUTROPHILS 64 32 - 75 %    LYMPHOCYTES 23 12 - 49 %    MONOCYTES 9 5 - 13 %    EOSINOPHILS 4 0 - 7 %    BASOPHILS 0 0 - 1 %    ABS. NEUTROPHILS 5.3 1.8 - 8.0 K/UL    ABS. LYMPHOCYTES 1.9 0.8 - 3.5 K/UL    ABS. MONOCYTES 0.8 0.0 - 1.0 K/UL    ABS. EOSINOPHILS 0.3 0.0 - 0.4 K/UL    ABS. BASOPHILS 0.0 0.0 - 0.1 K/UL   METABOLIC PANEL, COMPREHENSIVE    Collection Time: 07/23/17  2:52 AM   Result Value Ref Range    Sodium 138 136 - 145 mmol/L    Potassium 3.7 3.5 - 5.1 mmol/L    Chloride 104 97 - 108 mmol/L    CO2 24 21 - 32 mmol/L    Anion gap 10 5 - 15 mmol/L    Glucose 100 65 - 100 mg/dL    BUN 27 (H) 6 - 20 MG/DL    Creatinine 3.01 (H) 0.55 - 1.02 MG/DL    BUN/Creatinine ratio 9 (L) 12 - 20      GFR est AA 18 (L) >60 ml/min/1.73m2    GFR est non-AA 15 (L) >60 ml/min/1.73m2    Calcium 8.9 8.5 - 10.1 MG/DL    Bilirubin, total 0.2 0.2 - 1.0 MG/DL    ALT (SGPT) 12 12 - 78 U/L    AST (SGOT) 12 (L) 15 - 37 U/L    Alk.  phosphatase 125 (H) 45 - 117 U/L    Protein, total 6.2 (L) 6.4 - 8.2 g/dL    Albumin 2.8 (L) 3.5 - 5.0 g/dL    Globulin 3.4 2.0 - 4.0 g/dL    A-G Ratio 0.8 (L) 1.1 - 2.2     MAGNESIUM    Collection Time: 07/23/17  2:52 AM   Result Value Ref Range    Magnesium 1.6 1.6 - 2.4 mg/dL       Medications reviewed  Current Facility-Administered Medications   Medication Dose Route Frequency    levothyroxine (SYNTHROID) tablet 50 mcg  50 mcg Oral ACB    traZODone (DESYREL) tablet 25 mg  25 mg Oral QHS    venlafaxine-SR (EFFEXOR-XR) capsule 150 mg  150 mg Oral DAILY    heparin (porcine) injection 5,000 Units  5,000 Units SubCUTAneous Q8H    traMADol (ULTRAM) tablet 50 mg  50 mg Oral Q6H PRN    sodium chloride (NS) flush 5-10 mL  5-10 mL IntraVENous Q8H    naloxone (NARCAN) injection 0.4 mg  0.4 mg IntraVENous PRN    QUEtiapine (SEROquel) tablet 50 mg  50 mg Oral QHS PRN       Care Plan discussed with: Patient/DIL     Total time spent with patient: 30 minutes.     Mariella Mtz MD

## 2017-07-23 NOTE — PROGRESS NOTES
Have sent a message through [facility] to all 3/4 facilities to make them aware that patient is ready for discharge and need to know if they are able to take the patient . [facility] not able to accept the patient. [employer] and admissions is not in today.  From previous case mangers notes [facility] admissions is not in today either CM will follow up tomorrow. Yosvany 77 Manager of Case Management

## 2017-07-24 VITALS
TEMPERATURE: 98.8 F | HEIGHT: 63 IN | HEART RATE: 82 BPM | BODY MASS INDEX: 27.46 KG/M2 | WEIGHT: 155 LBS | SYSTOLIC BLOOD PRESSURE: 129 MMHG | OXYGEN SATURATION: 99 % | DIASTOLIC BLOOD PRESSURE: 77 MMHG | RESPIRATION RATE: 16 BRPM

## 2017-07-24 LAB
ALBUMIN SERPL BCP-MCNC: 2.9 G/DL (ref 3.5–5)
ALBUMIN/GLOB SERPL: 0.8 {RATIO} (ref 1.1–2.2)
ALP SERPL-CCNC: 126 U/L (ref 45–117)
ALT SERPL-CCNC: 13 U/L (ref 12–78)
ANION GAP BLD CALC-SCNC: 13 MMOL/L (ref 5–15)
AST SERPL W P-5'-P-CCNC: 14 U/L (ref 15–37)
BASOPHILS # BLD AUTO: 0 K/UL (ref 0–0.1)
BASOPHILS # BLD: 0 % (ref 0–1)
BILIRUB SERPL-MCNC: 0.3 MG/DL (ref 0.2–1)
BUN SERPL-MCNC: 27 MG/DL (ref 6–20)
BUN/CREAT SERPL: 10 (ref 12–20)
CALCIUM SERPL-MCNC: 9.1 MG/DL (ref 8.5–10.1)
CHLORIDE SERPL-SCNC: 105 MMOL/L (ref 97–108)
CO2 SERPL-SCNC: 23 MMOL/L (ref 21–32)
CREAT SERPL-MCNC: 2.84 MG/DL (ref 0.55–1.02)
EOSINOPHIL # BLD: 0.4 K/UL (ref 0–0.4)
EOSINOPHIL NFR BLD: 5 % (ref 0–7)
ERYTHROCYTE [DISTWIDTH] IN BLOOD BY AUTOMATED COUNT: 14.9 % (ref 11.5–14.5)
GLOBULIN SER CALC-MCNC: 3.5 G/DL (ref 2–4)
GLUCOSE SERPL-MCNC: 81 MG/DL (ref 65–100)
HCT VFR BLD AUTO: 34.1 % (ref 35–47)
HGB BLD-MCNC: 11.1 G/DL (ref 11.5–16)
LYMPHOCYTES # BLD AUTO: 29 % (ref 12–49)
LYMPHOCYTES # BLD: 2.4 K/UL (ref 0.8–3.5)
MAGNESIUM SERPL-MCNC: 1.6 MG/DL (ref 1.6–2.4)
MCH RBC QN AUTO: 28.4 PG (ref 26–34)
MCHC RBC AUTO-ENTMCNC: 32.6 G/DL (ref 30–36.5)
MCV RBC AUTO: 87.2 FL (ref 80–99)
MONOCYTES # BLD: 0.9 K/UL (ref 0–1)
MONOCYTES NFR BLD AUTO: 11 % (ref 5–13)
NEUTS SEG # BLD: 4.6 K/UL (ref 1.8–8)
NEUTS SEG NFR BLD AUTO: 55 % (ref 32–75)
PLATELET # BLD AUTO: 304 K/UL (ref 150–400)
POTASSIUM SERPL-SCNC: 3.7 MMOL/L (ref 3.5–5.1)
PROT SERPL-MCNC: 6.4 G/DL (ref 6.4–8.2)
RBC # BLD AUTO: 3.91 M/UL (ref 3.8–5.2)
SODIUM SERPL-SCNC: 141 MMOL/L (ref 136–145)
WBC # BLD AUTO: 8.3 K/UL (ref 3.6–11)

## 2017-07-24 PROCEDURE — 97116 GAIT TRAINING THERAPY: CPT

## 2017-07-24 PROCEDURE — 74011250637 HC RX REV CODE- 250/637: Performed by: INTERNAL MEDICINE

## 2017-07-24 PROCEDURE — 97535 SELF CARE MNGMENT TRAINING: CPT

## 2017-07-24 PROCEDURE — 36415 COLL VENOUS BLD VENIPUNCTURE: CPT | Performed by: FAMILY MEDICINE

## 2017-07-24 PROCEDURE — 97530 THERAPEUTIC ACTIVITIES: CPT

## 2017-07-24 PROCEDURE — 74011250636 HC RX REV CODE- 250/636: Performed by: INTERNAL MEDICINE

## 2017-07-24 PROCEDURE — 83735 ASSAY OF MAGNESIUM: CPT | Performed by: FAMILY MEDICINE

## 2017-07-24 PROCEDURE — 85025 COMPLETE CBC W/AUTO DIFF WBC: CPT | Performed by: FAMILY MEDICINE

## 2017-07-24 PROCEDURE — 80053 COMPREHEN METABOLIC PANEL: CPT | Performed by: FAMILY MEDICINE

## 2017-07-24 RX ADMIN — LEVOTHYROXINE SODIUM 50 MCG: 50 TABLET ORAL at 06:39

## 2017-07-24 RX ADMIN — HEPARIN SODIUM 5000 UNITS: 5000 INJECTION, SOLUTION INTRAVENOUS; SUBCUTANEOUS at 14:41

## 2017-07-24 RX ADMIN — HEPARIN SODIUM 5000 UNITS: 5000 INJECTION, SOLUTION INTRAVENOUS; SUBCUTANEOUS at 05:34

## 2017-07-24 RX ADMIN — VENLAFAXINE HYDROCHLORIDE 150 MG: 150 CAPSULE, EXTENDED RELEASE ORAL at 10:55

## 2017-07-24 NOTE — ROUTINE PROCESS
Bedside and Verbal shift change report given to Arvind Westfall RN (oncoming nurse) by Josiah Bucio RN (offgoing nurse). Report included the following information SBAR, Kardex, ED Summary, Intake/Output, MAR and Recent Results.

## 2017-07-24 NOTE — PROGRESS NOTES
[phone] spoke with daughter in law Sha Villanueva regarding patient tearfulness of going to Rehab facility \"I miss my 2 cats\" Sha Villanueva runs a  out of her home and will be meeting the patient at the Rehab this evening. [id number] report called to [facility] rehab regarding impending tx, called to Latonya Man RN all questions answered, patient info reviewed.

## 2017-07-24 NOTE — PROGRESS NOTES
Updated Jordan Odell the daughter in law on the status of the 4 referrals that were sent out on the patient with [facility] being the only facility that accepted the patient. She refuses this facility based on previous experience at the other [address] [place] [address] facility. She has been working on this today and request referral [facility] and she says if they will accept the patient she is agreeable to transfer there . She also is agreeable to a referral to [address] [place] [address] but would like to tour their first if accepted. Sent both of these referrals through [facility]. She also asked about [facility] referral and wanted to know if this was a possibility if these referrals did not work. I made her aware of the patient ability to have to work through 3 hrs of therapy a day and that many patients not being able to tolerate this. I told her that this was based on a physician referral and I would make her inquiry known in my note. Await SNF response Also placed call to Silvina at [facility] and l/m to make aware of the referral./Dee Francisco Manager of Case Management.

## 2017-07-24 NOTE — PROGRESS NOTES
Spiritual Care Assessment/Progress Notes    Anny Bun [id number]  [id number]    11/2/1932  80 y.o.  female    Patient Telephone Number: [phone] (home)   Rastafarian Affiliation: Yoeli   Language: English   Extended Emergency Contact Information  Primary Emergency Contact: Nohemy Rice, [address] Phone: [phone]  Mobile Phone: [phone]  Relation: Other Relative   Patient Active Problem List    Diagnosis Date Noted    Hyperkalemia 07/17/2017    Altered mental status 07/17/2017    UTI (urinary tract infection) 07/02/2017    Acute respiratory failure ([facility] 12/21/2014    Pulmonary embolism ([facility] 12/11/2014    NIMCO (acute kidney injury) ([facility] 12/11/2014    Renal lesion 11/05/2014    Autoimmune hemolytic anemia ([facility] 11/05/2014    Leukocytosis 10/25/2014    HTN (hypertension) 10/19/2014    Depression 10/19/2014    Hypothyroidism 10/19/2014    Diarrhea 10/19/2014    Leukopenia 10/19/2014    Anemia 10/19/2014    Transaminitis 10/19/2014    Hyperbilirubinemia 10/19/2014    Weakness 10/18/2014        Date: 7/24/2017       Level of Rastafarian/Spiritual Activity:  [x]         Involved in alla tradition/spiritual practice    []         Not involved in alla tradition/spiritual practice  [x]         Spiritually oriented    []         Claims no spiritual orientation    []         seeking spiritual identity  []         Feels alienated from Mu-ism practice/tradition  []         Feels angry about Mu-ism practice/tradition  [x]         Spirituality/Mu-ism tradition is a resource for coping at this time.   []         Not able to assess due to medical condition    Services Provided Today:  []         crisis intervention    []         reading Scriptures  [x]         spiritual assessment    [x]         prayer  [x]         empathic listening/emotional support  []         rites and rituals (cite in comments)  [x]         life review     [] Episcopalian support  []         theological development   []         advocacy  []         ethical dialog     [x]         blessing  []         bereavement support    []         support to family  []         anticipatory grief support   []         help with AMD  []         spiritual guidance    []         meditation      Spiritual Care Needs  [x]         Emotional Support  [x]         Spiritual/Yarsani Care  []         Loss/Adjustment  []         Advocacy/Referral                /Ethics  []         No needs expressed at               this time  []         Other: (note in               comments)  [address]  []         Follow up visits with               pt/family  []         Provide materials  []         Schedule sacraments  []         Contact Community               Clergy  [x]         Follow up as needed  []         Other: (note in               comments)     Comments:  is visiting for an initial spiritual assessment with pt in room 523. Pt appeared tearful initially and began sharing her concerns about being in the hospital. She feels like her children are keeping her in the hospital and does not understand why. Pt continued in sharing about this and her concerns over staying in the hospital. [address] offered a supportive presence allowing pt to process. Affirmation and encouragement provided.  offered prayer with pt and assurance of continued  support. Pt's affect appeared to brighten slightly after  visit and prayer. Spiritual care will continue to follow for support.      0476 Leatha Marks M.Div M.S, Steve Garsia6 available at [email or web address])

## 2017-07-24 NOTE — PROGRESS NOTES
7/24/2017 10:54 AM SW spoke with Dr. Paty Spears and let him know that this pt's family is aware that discharge is approaching. Informed him that pt's family has chosen facilities that are full or suspected to be full. Lakhwinder Whitfield     7/24/2017 10:47 AM Follow up call made to the's dtr-in-law Rosalie Rne. MIGUEL informed her that [facility] is full presently and that it is unknown when they will have a bed available for certain. SW also informed her that there has not been any response from [facility] and that this usually indicates that they are full. SW has informed her that she needs to pick a back up. Rosalie Ren states, \"I don't know what to tell you. \" MIGUEL informed her that the Md will not necessarily wait for several days for a bed to become available. Lakhwinder Whitfield     7/24/2017 8:57 AM Call received from Rosalie Ren, pt's dtr-in-law this AM. She reports that she would really like [facility] or [facility] for placement. She confirms that an MD (Dr. Natalie Osei) told her yesterday that discharge would likely happen today. Rosalie Ren asked what would happen if a SNF does not have beds available for several days. MIGUEL informed her that there is no guarantee  that the Md would like let the pt stay for additional days just so that they could get the SNF that they wanted. MIGUEL informed her that her Md is looking towards discharge.    KASSIE Whitfield

## 2017-07-24 NOTE — PROGRESS NOTES
Problem: Self Care Deficits Care Plan (Adult)  Goal: *Acute Goals and Plan of Care (Insert Text)  Occupational Therapy Goals  Initiated 7/21/2017  1. Patient will perform grooming in standing for at least 3 minutes with minimal assistance/contact guard assist within 7 day(s). 2. Patient will perform lower body dressing with minimal assistance/contact guard assist within 7 day(s). 3. Patient will perform toilet transfers with minimal assistance/contact guard assist using best DME for safety within 7 day(s). 4. Patient will perform all aspects of toileting with minimal assistance/contact guard assist within 7 day(s). 5. Patient will participate in upper extremity therapeutic exercise/activities with supervision/set-up for 10 minutes within 7 day(s). 6. Patient will utilize energy conservation techniques during functional activities with verbal and visual cues within 7 day(s). OCCUPATIONAL THERAPY TREATMENT  Patient: Geri Bradley [de-identified]80 y.o. female)  Date: 7/24/2017  Diagnosis: NIMCO (acute kidney injury) ([facility] <principal problem not specified>       Precautions: Fall, DNR      ASSESSMENT:  Patient received in bed reading, and agreeable to OOB activity. Patient noted with confusion and reporting she does not recall how she got here, or for what reason. Patient requires use of RW for standing support. Once standing, patient able to transfer to chair in room, and completes with minimum assistance. Patient provided opportunity for grooming tasks and patient soto hair with min assist due to assistance with tangles at back of hair, and able to wash face with supervision in sitting. Patient impulsive with attempts to stand and noted with decreased safety. Will follow up with patient for continued OT services.    Progression toward goals:  [X]       Improving appropriately and progressing toward goals  [ ]       Improving slowly and progressing toward goals  [ ]       Not making progress toward goals and plan of care will be adjusted       PLAN:  Patient continues to benefit from skilled intervention to address the above impairments. Continue treatment per established plan of care. Discharge Recommendations:  Skilled Nursing Facility  Further Equipment Recommendations for Discharge:  TBD       SUBJECTIVE:   Patient stated I can't wait to go home and see my kitties soon.       OBJECTIVE DATA SUMMARY:   Cognitive/Behavioral Status:  Neurologic State: Alert;Confused  Orientation Level: Disoriented to situation;Disoriented to time  Cognition: Follows commands; Impaired decision making     Perseveration: No perseveration noted  Safety/Judgement: Awareness of environment;Decreased awareness of need for assistance;Decreased awareness of need for safety     Functional Mobility and Transfers for ADLs:  Bed Mobility:  Supine to Sit: Supervision (HOB elevated in bed.  moved to R side of bed )     Transfers:  Sit to Stand: Minimum assistance  Functional Transfers  Bathroom Mobility: Contact guard assistance;Minimum assistance  Toilet Transfer : Minimum assistance     Balance:  Sitting: Intact  Standing: Impaired  Standing - Static: Fair;Constant support  Standing - Dynamic : Fair     ADL Intervention:        Grooming  Grooming Assistance: Minimum assistance (seated at EOB)  Washing Face: Supervision/set-up  Brushing/Combing Hair: Minimum assistance  Cues: Physical assistance (assist for tangles at back of hair )     Cognitive Retraining  Safety/Judgement: Awareness of environment;Decreased awareness of need for assistance;Decreased awareness of need for safety     Neuro Re-Education:           Therapeutic Exercises:      Pain:  Pain Scale 1: Numeric (0 - 10)  Pain Intensity 1: 0              Activity Tolerance:   VSS  Please refer to the flowsheet for vital signs taken during this treatment.   After treatment:   [X] Patient left in no apparent distress sitting up in chair  [ ] Patient left in no apparent distress in bed  [X] Call bell left within reach  [X] Nursing notified  [ ] Caregiver present  [X] Bed alarm activated      COMMUNICATION/COLLABORATION:   The patients plan of care was discussed with: Physical Therapy Assistant and Registered Nurse     PHILIPP Tomlinson/L  Time Calculation: 18 mins

## 2017-07-24 NOTE — PROGRESS NOTES
Nutrition Assessment:    RECOMMENDATIONS/INTERVENTION(S):   Continue regular liberalized diet to encourage PO intake  Add Magic cup and Mighty shake  Monitor PO intakes. ASSESSMENT:   7/24: Pt seen for LOS. Pt admitted for AMS. PMHx: HTN, hypothyroid, NIMCO, UTI, PE. Pt on regular diet, pt eating  25% -50% of meals. Pt was seen during previous admission. Pt did not like Ensure Enlive previously. Allow snacks and food preferences. Pt documented as eating 25% of meal. Wt documented as the same as previous admission. Pt likely not meeting nutritional needs. Pt would benefit from assistance with feedings. Labs/med reviewed. SUBJECTIVE/OBJECTIVE:   Diet Order: Regular  % Eaten:  Patient Vitals for the past 72 hrs:   % Diet Eaten   07/22/17 1356 25 %   07/22/17 0945 50 %     Pertinent Medications: [x] Reviewed    Chemistries:  Lab Results   Component Value Date/Time    Sodium 141 07/24/2017 03:49 AM    Potassium 3.7 07/24/2017 03:49 AM    Chloride 105 07/24/2017 03:49 AM    CO2 23 07/24/2017 03:49 AM    Anion gap 13 07/24/2017 03:49 AM    Glucose 81 07/24/2017 03:49 AM    BUN 27 07/24/2017 03:49 AM    Creatinine 2.84 07/24/2017 03:49 AM    BUN/Creatinine ratio 10 07/24/2017 03:49 AM    GFR est AA 19 07/24/2017 03:49 AM    GFR est non-AA 16 07/24/2017 03:49 AM    Calcium 9.1 07/24/2017 03:49 AM    AST (SGOT) 14 07/24/2017 03:49 AM    Alk. phosphatase 126 07/24/2017 03:49 AM    Protein, total 6.4 07/24/2017 03:49 AM    Albumin 2.9 07/24/2017 03:49 AM    Globulin 3.5 07/24/2017 03:49 AM    A-G Ratio 0.8 07/24/2017 03:49 AM    ALT (SGPT) 13 07/24/2017 03:49 AM      Anthropometrics: Height: 5' 3\" (160 cm) Weight: 70.3 kg (155 lb)    IBW (%IBW):   ( ) UBW (%UBW):   (  %)    BMI: Body mass index is 27.46 kg/(m^2).     This BMI is indicative of:   [] Underweight    [] Normal    [x] Overweight    []  Obesity    []  Extreme Obesity (BMI>40)  Estimated Nutrition Needs (Based on): 5735 Kcals/day (BMR (1122x1.3)) , 56 g (-70g/day ( 0.8-1.0g/kg)) Protein  Carbohydrate: At Least 130 g/day  Fluids: 1450 mL/day    Last BM: 7/21   [x]Active     []Hyperactive  []Hypoactive       [] Absent   BS  Skin:    [x] Intact   [] Incision  [] Breakdown   [] DTI   [] Tears/Excoriation/Abrasion  []Edema [] Other:    Wt Readings from Last 30 Encounters:   07/17/17 70.3 kg (155 lb)   07/02/17 70.3 kg (155 lb)   09/16/16 72.6 kg (160 lb)   04/04/16 72.6 kg (160 lb)   04/08/15 77.2 kg (170 lb 3.2 oz)   02/09/15 75.3 kg (166 lb)   12/24/14 91.4 kg (201 lb 8 oz)   12/11/14 77.1 kg (170 lb)   12/03/14 76.9 kg (169 lb 9.6 oz)   11/24/14 77.1 kg (170 lb)   11/05/14 74 kg (163 lb 3.2 oz)   10/18/14 76.2 kg (168 lb)      NUTRITION DIAGNOSES:   Problem:  Inadequate energy intake     Etiology: related to decreased ability to consume sufficient energy     Signs/Symptoms: as evidenced by 25% PO intakes, dehydration      NUTRITION INTERVENTIONS:  Meals/Snacks: General/healthful diet   Supplements: Commercial supplement              GOAL:   Pt will consume > 50% of meals within 3-5 days    Cultural, Orthodoxy, or Ethnic Dietary Needs: None     LEARNING NEEDS (Diet, Food/Nutrient-Drug Interaction):    [x] None Identified   [] Identified and Education Provided/Documented   [] Identified and Pt declined/was not appropriate      [x] Interdisciplinary Care Plan Reviewed/Documented    [x] Discharge Needs:   TBD    [] No Nutrition Related Discharge Needs    NUTRITION RISK:   Pt Is At Nutrition Risk  [x]     No Nutrition Risk Identified  []       PT SEEN FOR:    []  MD Consult: []Calorie Count      []Diabetic Diet Education        []Diet Education     []Electrolyte Management     []General Nutrition Management and Supplements     []Management of Tube Feeding     []TPN Recommendations    []  RN Referral:  []MST score >=2     []Enteral/Parenteral Nutrition PTA     []Pregnant: Gestational DM or Multigestation                 [] Pressure Ulcer      []  Low BMI      [x]  Length of Stay [] Dysphagia Diet         [] Ventilator      []  Follow-Up      Previous Recommendations:   [] Implemented          [] Not Implemented          [x] Not Applicable    Previous Goal:   [] Met              [] Progressing Towards Goal              [] Not Progressing Towards Goal   [x] Not Applicable              Zak Hemphill, [address] [place] [address]   Pager [phone]

## 2017-07-24 NOTE — PROGRESS NOTES
Spoke with Gregor at [facility] and they are able to take the patient for admission today to their facility. Called the patient's daughter in law Magaly Lazaro and made her aware and she is accepting of this bed. Ambulance referral sent through [facility] to [facility] for p/u at 5:00. Await acceptance of this and the time and will need to call the daughter back with time of . Please send the AVS and hard scripts with the patient.  Nursing please call report to [facility] at [phone]. Alicia Kaplan of Case Management

## 2017-07-24 NOTE — PROGRESS NOTES
7/24/2017 4:45 PM After Visit Summary has been sent via [address]    Lakhwinder Rodriguez     7/24/2017 3:57 PM SW informed Dr. Alcon Brink that this pt will be transported to [facility] and [address] [place] [address] today at 5:00PM  KASSIE Rodriguez

## 2017-07-24 NOTE — PROGRESS NOTES
Problem: Falls - Risk of  Goal: *Absence of falls  Outcome: Progressing Towards Goal  Bed alarm placed on patient at all times. Patient forgetful of need to call staff when getting up  Goal: *Knowledge of fall prevention  Outcome: Not Progressing Towards Goal  Patient deals with confusion and forgets to call staff for assistance    Problem: Pressure Injury - Risk of  Goal: *Prevention of pressure ulcer  Outcome: Progressing Towards Goal  Patient sat up in chair yesterday. Able to turn self in bed.

## 2017-07-24 NOTE — PROGRESS NOTES
Called and left a message for admissions at [facility] checking on status of the referral and acceptance of the patient. Spoke with Luis Daniel Saleh at [facility] and they are reviewing the patient this morning for bed availability.  L/M for the admissions rep, Mikel Dandy, at [facility] requesting a return phone call.  Lamar Mccoy Manager of Case Management

## 2017-07-24 NOTE — DISCHARGE SUMMARY
Physician Discharge Summary     Patient ID:    Christina Min  [id number]  80 y.o.  11/2/1932  Alison Allen MD    Admit date: 7/17/2017    Discharge date and time: 7/24/2017    Admission Diagnoses: NIMCO (acute kidney injury) ([facility]    Chronic Diagnoses:    Problem List as of 7/24/2017  Date Reviewed: 7/2/2017          Codes Class Noted - Resolved    Hyperkalemia ICD-10-CM: E87.5  ICD-9-CM: 276.7  7/17/2017 - Present        Altered mental status ICD-10-CM: R41.82  ICD-9-CM: 780.97  7/17/2017 - Present        UTI (urinary tract infection) ICD-10-CM: N39.0  ICD-9-CM: 599.0  7/2/2017 - Present        Acute respiratory failure ([facility] ICD-10-CM: J96.00  ICD-9-CM: 518.81  12/21/2014 - Present        Pulmonary embolism ([facility] ICD-10-CM: I26.99  ICD-9-CM: 415.19  12/11/2014 - Present        NIMCO (acute kidney injury) ([facility] ICD-10-CM: N17.9  ICD-9-CM: 584.9  12/11/2014 - Present        Renal lesion ICD-10-CM: N28.9  ICD-9-CM: 593.9  11/5/2014 - Present        Autoimmune hemolytic anemia ([facility] ICD-10-CM: D59.1  ICD-9-CM: 283.0  11/5/2014 - Present        Leukocytosis ICD-10-CM: D72.829  ICD-9-CM: 288.60  10/25/2014 - Present        HTN (hypertension) ICD-10-CM: I10  ICD-9-CM: 401.9  10/19/2014 - Present        Depression ICD-10-CM: F32.9  ICD-9-CM: 056  10/19/2014 - Present        Hypothyroidism ICD-10-CM: E03.9  ICD-9-CM: 244.9  10/19/2014 - Present        Diarrhea ICD-10-CM: R19.7  ICD-9-CM: 787.91  10/19/2014 - Present        Leukopenia ICD-10-CM: D72.819  ICD-9-CM: 288.50  10/19/2014 - Present        Anemia ICD-10-CM: D64.9  ICD-9-CM: 285.9  10/19/2014 - Present        Transaminitis ICD-10-CM: R74.0  ICD-9-CM: 790.4  10/19/2014 - Present        Hyperbilirubinemia ICD-10-CM: E80.6  ICD-9-CM: 782.4  10/19/2014 - Present        Weakness ICD-10-CM: R53.1  ICD-9-CM: 780.79  10/18/2014 - Present              Discharge Medications:   Current Discharge Medication List      CONTINUE these medications which have NOT CHANGED Details   traZODone (DESYREL) 50 mg tablet Take 0.5 Tabs by mouth nightly. Qty: 30 Tab, Refills: 0      buPROPion XL (WELLBUTRIN XL) 300 mg XL tablet Take 300 mg by mouth every morning. !! venlafaxine-SR (EFFEXOR-XR) 75 mg capsule Take 75 mg by mouth daily. In addition to 150 mg daily. Total 225 mg daily. !! venlafaxine-SR (EFFEXOR XR) 150 mg capsule Take 150 mg by mouth daily. In addition to 75 mg daily. Total 225 mg daily. levothyroxine (LEVOTHROID) 50 mcg tablet Take 50 mcg by mouth Daily (before breakfast). albuterol (PROAIR HFA) 90 mcg/actuation inhaler Take 2 puffs by inhalation every four (4) hours as needed. CARBAMIDE PEROXIDE (EAR WAX DROPS OT) 1 drop by Otic route daily as needed. !! - Potential duplicate medications found. Please discuss with provider. STOP taking these medications       traMADol (ULTRAM) 50 mg tablet Comments:   Reason for Stopping:         gabapentin (NEURONTIN) 100 mg capsule Comments:   Reason for Stopping: Follow up Care:    1. Jose Goins MD with in 1 weeks  2.  specialists as directed. Diet:  Cardiac Diet  Disposition:  Rehab.   Advanced Directive:  Discharge Exam:  [See today's progress note.]  CONSULTATIONS: Nephrology    Significant Diagnostic Studies:   Recent Labs      07/24/17 0349 07/23/17   0252   WBC  8.3  8.3   HGB  11.1*  10.7*   HCT  34.1*  32.8*   PLT  304  306     Recent Labs      07/24/17 0349 07/23/17   0252  07/22/17   0234   NA  141  138  141   K  3.7  3.7  3.8   CL  105  104  107   CO2  23  24  22   BUN  27*  27*  27*   CREA  2.84*  3.01*  2.89*   GLU  81  100  107*   CA  9.1  8.9  8.4*   MG  1.6  1.6  1.7     Recent Labs      07/24/17 0349 07/23/17   0252  07/22/17   0234   SGOT  14*  12*  15   ALT  13  12  12   AP  126*  125*  135*   TBILI  0.3  0.2  0.2   TP  6.4  6.2*  6.3*   ALB  2.9*  2.8*  2.8*   GLOB  3.5  3.4  3.5     Lab Results   Component Value Date/Time    TSH 1.62 07/03/2017 10:35 AM HOSPITAL COURSE & TREATMENT RENDERED:   1. See today's progress note:  Daily Progress Note and Discharge Note: 7/24/2017  Maria Fernanda Piper-Umlang  Patty Smith MD         Assessment/Plan:   NIMCO (acute kidney injury): recurrent, severe. Cr up to 3.62; was 2.7 at discharge lasat admission. Of note, her Cr was 1.37 in Sept 2016. Workup during last admission was unremarkable (HCV, complements, FLC ratio, etc). Improving with time/hydration. -- avoid nephrotoxins, renally dose meds  -- nephrology consulted and follow up outpt  --encourage PO fluid intake       Hyperkalemia: due to NIMCO and K supplements. resolved  -- Kayexalate given at admission  -- follow BMP      Acute delirium on known dementia: with generalized weakness. Improving as she clinically improves  -- hold gabapentin       Depression: with underlying dementia  -- cont Effexor at decrease dose due to NIMCO / severe renal insufficiency- consistent dosing of this everyday will help her more than an increased dose.   -- hold Wellbutrin  -- cont trazodone        Hypothyroidism (): recent TSH WNR  -- cont LT4        Autoimmune hemolytic anemia (HCC) (): no active hemolysis  -- trend Hgbs  --stable for now      History of Pulmonary embolism: off anticoagulation      Generalized debility: due to chronic diseases  --PT/OT rec return to SNF      Code Status: DNR, d/w family      Surrogate decision maker: daughter in law      Problem List:  Problem List as of 7/24/2017  Date Reviewed: 7/2/2017             Codes Class Noted - Resolved     Hyperkalemia ICD-10-CM: E87.5  ICD-9-CM: 276.7   7/17/2017 - Present           Altered mental status ICD-10-CM: R41.82  ICD-9-CM: 780.97   7/17/2017 - Present           UTI (urinary tract infection) ICD-10-CM: N39.0  ICD-9-CM: 599.0   7/2/2017 - Present           Acute respiratory failure ([facility] ICD-10-CM: J96.00  ICD-9-CM: 518.81   12/21/2014 - Present           Pulmonary embolism ([facility] ICD-10-CM: I26.99  ICD-9-CM: 415.19   12/11/2014 - Present           NIMCO (acute kidney injury) ([facility] ICD-10-CM: N17.9  ICD-9-CM: 072. 9   12/11/2014 - Present           Renal lesion ICD-10-CM: N28.9  ICD-9-CM: 593.9   11/5/2014 - Present           Autoimmune hemolytic anemia (HCC) ICD-10-CM: D59.1  ICD-9-CM: 283.0   11/5/2014 - Present           Leukocytosis ICD-10-CM: O45.782  ICD-9-CM: 288.60   10/25/2014 - Present           HTN (hypertension) ICD-10-CM: I10  ICD-9-CM: 126. 9   10/19/2014 - Present           Depression ICD-10-CM: F32.9  ICD-9-CM: 842   10/19/2014 - Present           Hypothyroidism ICD-10-CM: E03.9  ICD-9-CM: 244. 9   10/19/2014 - Present           Diarrhea ICD-10-CM: R19.7  ICD-9-CM: 787.91   10/19/2014 - Present           Leukopenia ICD-10-CM: R57.835  ICD-9-CM: 288.50   10/19/2014 - Present           Anemia ICD-10-CM: D64.9  ICD-9-CM: 598. 9   10/19/2014 - Present           Transaminitis ICD-10-CM: R74.0  ICD-9-CM: 790.4   10/19/2014 - Present           Hyperbilirubinemia ICD-10-CM: E80.6  ICD-9-CM: 446. 4   10/19/2014 - Present           Weakness ICD-10-CM: R53.1  ICD-9-CM: 780.79   10/18/2014 - Present                  Subjective:    80 y. o. female w/ hx of HTN, hypothyroidism, depression, prior PE, dementia who presents with weakness and AMS. History provided by daughter at bedside. Pt was recently admitted for NIMCO and found to have UTI. She was d/c'd to SNF and in the last couple of days, family reports severe generalized weakness and worsening confusion. She was unable to ambulate at all. Daughter notes marked worsening of mentation (from 2/10 confusion to 10/10). Hallucinating visually. No fevers or chills. Has had poor PO intake. Family expresses frustration of care provided at [facility]. ED workup showed NIMCO (Cr up to 3.62; was 2.7 at discharge) and hyperkalemia (6.1). Pt is on K supplements.    Ms. Bradley is admitted for further evaluation and management of NIMCO with hyperkalemia ( Dr Bianca Mcdaniels)     7/18:  Judy Dickson but will arouse and say a few words. She does know she is in a hospital but not which one. No complaints at this time. K+ was high and received lactulose po.      :  Awake and alert. Oriented to person but not place at this time. No complaints and no pain she reports. AM labs pending.      :  Little change. Potassium now normal.  Creat grad improving. Cultures neg so far. She will need SNF for rehab.      :  She seems more alert today. Creat continues to improve. Cultures negative. Cont PT/OT. Needs SNF for rehab.      : Pt with not complaints this AM.  Recognized me as her PCP when I walked in. Tolerating PO, +BM yesterday. GERI notes that pt was tearful yesterday when she thought she was going home. Wondering if she needs increase in effexor. I discussed with GERI that pt is always tearful in my office, no matter what her effexor dose is. She was also missing it quite often and c/o dizziness. Pt is very sensitive about wanting to stay in her home environment and not live in a nursing home. For over a year now, I have been trying to lead pt and family in the direction of living in [place] or [place] as pt is requiring more care. GERI was very receptive to my concerns. Liked [facility], but wants to tour a few more facilities. WIll be ready to make decision soon.     : Cr up as she is off IVF. Encouraged PO hydration. Pt denies emotional lability or feeling sad. Tolerating PO, +BM.       :  Little change. Creat down a bit. Stable for SCF.   Accepted at [facility].         Review of Systems:   Review of systems not obtained due to patient factors.     Objective:   Physical Exam:           Visit Vitals    /76 (BP 1 Location: Right arm, BP Patient Position: At rest)    Pulse 94    Temp 98.1 °F (36.7 °C)    Resp 18    Ht 5' 3\" (1.6 m)    Wt 70.3 kg (155 lb)    SpO2 98%    BMI 27.46 kg/m2      O2 Device: Room air     Temp (24hrs), Av.3 °F (36.8 °C), Min:98.1 °F (36.7 °C), Max:98.6 °F (37 °C)        07/22 0701 - 07/23 1900  In: 12 [P.O.:560]  Out: 250 [Urine:250]     General:  Elderly, frail. NAD, appears stated age. Bright, cheeful   Head:  Normocephalic, without obvious abnormality, atraumatic. Eyes:  Conjunctivae/corneas clear. EOMs intact. Nose: Nares normal. Septum midline. Throat: Lips, mucosa, and tongue moist.   Neck: Supple, symmetrical, trachea midline, no adenopathy, thyroid: no enlargement/tenderness/nodules, no carotid bruit and no JVD. Back:   Symmetric, no curvature. ROM normal. No CVA tenderness. Lungs:   Clear to auscultation bilaterally. Chest wall:  No tenderness or deformity. Heart:  Regular rate and rhythm, S1, S2 normal, no murmur, click, rub or gallop. Abdomen:   Soft, non-tender. Bowel sounds normal. No masses,  No organomegaly. Extremities: no cyanosis or edema. No calf tenderness or cords. Pulses: 2+ and symmetric all extremities. Skin: Skin color, texture, turgor normal. No rashes or lesions   Neurologic:  Alert and oriented X 1 and at occas x2. Follows commands. No cogwheeling or rigidity. Gait not tested at this time. Sensation grossly normal to touch.   Gross motor of extremities normal.           Signed:  Ange Telles MD  7/24/2017  4:10 PM

## 2017-07-24 NOTE — PROGRESS NOTES
Daily Progress Note and Discharge Note: 7/24/2017  Maria Fernanda Mejias MD    Assessment/Plan:   NIMCO (acute kidney injury): recurrent, severe. Cr up to 3.62; was 2.7 at discharge lasat admission. Of note, her Cr was 1.37 in Sept 2016. Workup during last admission was unremarkable (HCV, complements, FLC ratio, etc). Improving with time/hydration. -- avoid nephrotoxins, renally dose meds  -- nephrology consulted and follow up outpt  --encourage PO fluid intake      Hyperkalemia: due to NIMCO and K supplements. resolved  -- Kayexalate given at admission  -- follow BMP      Acute delirium on known dementia: with generalized weakness. Improving as she clinically improves  -- hold gabapentin      Depression: with underlying dementia  -- cont Effexor at decrease dose due to NIMCO / severe renal insufficiency- consistent dosing of this everyday will help her more than an increased dose.   -- hold Wellbutrin  -- cont trazodone       Hypothyroidism (): recent TSH WNR  -- cont LT4       Autoimmune hemolytic anemia (HCC) (): no active hemolysis  -- trend Hgbs  --stable for now     History of Pulmonary embolism: off anticoagulation     Generalized debility: due to chronic diseases  --PT/OT rec return to SNF     Code Status: DNR, d/w family      Surrogate decision maker: daughter in law       Problem List:  Problem List as of 7/24/2017  Date Reviewed: 7/2/2017          Codes Class Noted - Resolved    Hyperkalemia ICD-10-CM: E87.5  ICD-9-CM: 276.7  7/17/2017 - Present        Altered mental status ICD-10-CM: R41.82  ICD-9-CM: 780.97  7/17/2017 - Present        UTI (urinary tract infection) ICD-10-CM: N39.0  ICD-9-CM: 599.0  7/2/2017 - Present        Acute respiratory failure ([facility] ICD-10-CM: J96.00  ICD-9-CM: 518.81  12/21/2014 - Present        Pulmonary embolism ([facility] ICD-10-CM: I26.99  ICD-9-CM: 415.19  12/11/2014 - Present        NIMCO (acute kidney injury) ([facility] ICD-10-CM: N17.9  ICD-9-CM: 584.9  12/11/2014 - Present        Renal lesion ICD-10-CM: N28.9  ICD-9-CM: 593.9  11/5/2014 - Present        Autoimmune hemolytic anemia (HCC) ICD-10-CM: D59.1  ICD-9-CM: 283.0  11/5/2014 - Present        Leukocytosis ICD-10-CM: E96.225  ICD-9-CM: 288.60  10/25/2014 - Present        HTN (hypertension) ICD-10-CM: I10  ICD-9-CM: 401.9  10/19/2014 - Present        Depression ICD-10-CM: F32.9  ICD-9-CM: 364  10/19/2014 - Present        Hypothyroidism ICD-10-CM: E03.9  ICD-9-CM: 244.9  10/19/2014 - Present        Diarrhea ICD-10-CM: R19.7  ICD-9-CM: 787.91  10/19/2014 - Present        Leukopenia ICD-10-CM: D72.819  ICD-9-CM: 288.50  10/19/2014 - Present        Anemia ICD-10-CM: D64.9  ICD-9-CM: 285.9  10/19/2014 - Present        Transaminitis ICD-10-CM: R74.0  ICD-9-CM: 790.4  10/19/2014 - Present        Hyperbilirubinemia ICD-10-CM: E80.6  ICD-9-CM: 782.4  10/19/2014 - Present        Weakness ICD-10-CM: R53.1  ICD-9-CM: 780.79  10/18/2014 - Present              Subjective:    80 y.o. female w/ hx of HTN, hypothyroidism, depression, prior PE, dementia who presents with weakness and AMS. History provided by daughter at bedside. Pt was recently admitted for NIMCO and found to have UTI. She was d/c'd to SNF and in the last couple of days, family reports severe generalized weakness and worsening confusion. She was unable to ambulate at all. Daughter notes marked worsening of mentation (from 2/10 confusion to 10/10). Hallucinating visually. No fevers or chills. Has had poor PO intake. Family expresses frustration of care provided at [facility]. ED workup showed NIMCO (Cr up to 3.62; was 2.7 at discharge) and hyperkalemia (6.1). Pt is on K supplements. Ms. Richard Saleh is admitted for further evaluation and management of NIMCO with hyperkalemia ( Dr Hedy Daniel)    7/18:  Sleepy but will arouse and say a few words. She does know she is in a hospital but not which one. No complaints at this time. K+ was high and received lactulose po.     7/19:  Awake and alert. Oriented to person but not place at this time. No complaints and no pain she reports. AM labs pending.     :  Little change. Potassium now normal.  Creat grad improving. Cultures neg so far. She will need SNF for rehab.     :  She seems more alert today. Creat continues to improve. Cultures negative. Cont PT/OT. Needs SNF for rehab.     : Pt with not complaints this AM.  Recognized me as her PCP when I walked in. Tolerating PO, +BM yesterday. GERI notes that pt was tearful yesterday when she thought she was going home. Wondering if she needs increase in effexor. I discussed with GERI that pt is always tearful in my office, no matter what her effexor dose is. She was also missing it quite often and c/o dizziness. Pt is very sensitive about wanting to stay in her home environment and not live in a nursing home. For over a year now, I have been trying to lead pt and family in the direction of living in [place] or [place] as pt is requiring more care. GERI was very receptive to my concerns. Liked [facility], but wants to tour a few more facilities. WIll be ready to make decision soon. : Cr up as she is off IVF. Encouraged PO hydration. Pt denies emotional lability or feeling sad. Tolerating PO, +BM.      :  Little change. Creat down a bit. Stable for SCF. Accepted at [facility]. Review of Systems:   Review of systems not obtained due to patient factors. Objective:   Physical Exam:     Visit Vitals    /76 (BP 1 Location: Right arm, BP Patient Position: At rest)    Pulse 94    Temp 98.1 °F (36.7 °C)    Resp 18    Ht 5' 3\" (1.6 m)    Wt 70.3 kg (155 lb)    SpO2 98%    BMI 27.46 kg/m2      O2 Device: Room air    Temp (24hrs), Av.3 °F (36.8 °C), Min:98.1 °F (36.7 °C), Max:98.6 °F (37 °C)         07 -  1900  In: 12 [P.O.:560]  Out: 250 [Urine:250]    General:  Elderly, frail. NAD, appears stated age.   Bright, cheeful   Head:  Normocephalic, without obvious abnormality, atraumatic. Eyes:  Conjunctivae/corneas clear. EOMs intact. Nose: Nares normal. Septum midline. Throat: Lips, mucosa, and tongue moist.   Neck: Supple, symmetrical, trachea midline, no adenopathy, thyroid: no enlargement/tenderness/nodules, no carotid bruit and no JVD. Back:   Symmetric, no curvature. ROM normal. No CVA tenderness. Lungs:   Clear to auscultation bilaterally. Chest wall:  No tenderness or deformity. Heart:  Regular rate and rhythm, S1, S2 normal, no murmur, click, rub or gallop. Abdomen:   Soft, non-tender. Bowel sounds normal. No masses,  No organomegaly. Extremities: no cyanosis or edema. No calf tenderness or cords. Pulses: 2+ and symmetric all extremities. Skin: Skin color, texture, turgor normal. No rashes or lesions   Neurologic:  Alert and oriented X 1 and at occas x2. Follows commands. No cogwheeling or rigidity. Gait not tested at this time. Sensation grossly normal to touch. Gross motor of extremities normal.       Data Review:       Recent Days:  Recent Labs      07/24/17   0349  07/23/17   0252  07/22/17   0234   WBC  8.3  8.3  7.3   HGB  11.1*  10.7*  10.5*   HCT  34.1*  32.8*  32.0*   PLT  304  306  298     Recent Labs      07/24/17   0349  07/23/17   0252  07/22/17   0234   NA  141  138  141   K  3.7  3.7  3.8   CL  105  104  107   CO2  23  24  22   GLU  81  100  107*   BUN  27*  27*  27*   CREA  2.84*  3.01*  2.89*   CA  9.1  8.9  8.4*   MG  1.6  1.6  1.7   ALB  2.9*  2.8*  2.8*   TBILI  0.3  0.2  0.2   SGOT  14*  12*  15   ALT  13  12  12     No results for input(s): PH, PCO2, PO2, HCO3, FIO2 in the last 72 hours.     24 Hour Results:  Recent Results (from the past 24 hour(s))   CBC WITH AUTOMATED DIFF    Collection Time: 07/24/17  3:49 AM   Result Value Ref Range    WBC 8.3 3.6 - 11.0 K/uL    RBC 3.91 3.80 - 5.20 M/uL    HGB 11.1 (L) 11.5 - 16.0 g/dL    HCT 34.1 (L) 35.0 - 47.0 %    MCV 87.2 80.0 - 99.0 FL    MCH 28.4 26.0 - 34.0 PG    MCHC 32.6 30.0 - 36.5 g/dL    RDW 14.9 (H) 11.5 - 14.5 %    PLATELET 470 890 - 455 K/uL    NEUTROPHILS 55 32 - 75 %    LYMPHOCYTES 29 12 - 49 %    MONOCYTES 11 5 - 13 %    EOSINOPHILS 5 0 - 7 %    BASOPHILS 0 0 - 1 %    ABS. NEUTROPHILS 4.6 1.8 - 8.0 K/UL    ABS. LYMPHOCYTES 2.4 0.8 - 3.5 K/UL    ABS. MONOCYTES 0.9 0.0 - 1.0 K/UL    ABS. EOSINOPHILS 0.4 0.0 - 0.4 K/UL    ABS. BASOPHILS 0.0 0.0 - 0.1 K/UL   METABOLIC PANEL, COMPREHENSIVE    Collection Time: 07/24/17  3:49 AM   Result Value Ref Range    Sodium 141 136 - 145 mmol/L    Potassium 3.7 3.5 - 5.1 mmol/L    Chloride 105 97 - 108 mmol/L    CO2 23 21 - 32 mmol/L    Anion gap 13 5 - 15 mmol/L    Glucose 81 65 - 100 mg/dL    BUN 27 (H) 6 - 20 MG/DL    Creatinine 2.84 (H) 0.55 - 1.02 MG/DL    BUN/Creatinine ratio 10 (L) 12 - 20      GFR est AA 19 (L) >60 ml/min/1.73m2    GFR est non-AA 16 (L) >60 ml/min/1.73m2    Calcium 9.1 8.5 - 10.1 MG/DL    Bilirubin, total 0.3 0.2 - 1.0 MG/DL    ALT (SGPT) 13 12 - 78 U/L    AST (SGOT) 14 (L) 15 - 37 U/L    Alk. phosphatase 126 (H) 45 - 117 U/L    Protein, total 6.4 6.4 - 8.2 g/dL    Albumin 2.9 (L) 3.5 - 5.0 g/dL    Globulin 3.5 2.0 - 4.0 g/dL    A-G Ratio 0.8 (L) 1.1 - 2.2     MAGNESIUM    Collection Time: 07/24/17  3:49 AM   Result Value Ref Range    Magnesium 1.6 1.6 - 2.4 mg/dL       Medications reviewed  Current Facility-Administered Medications   Medication Dose Route Frequency    levothyroxine (SYNTHROID) tablet 50 mcg  50 mcg Oral ACB    traZODone (DESYREL) tablet 25 mg  25 mg Oral QHS    venlafaxine-SR (EFFEXOR-XR) capsule 150 mg  150 mg Oral DAILY    heparin (porcine) injection 5,000 Units  5,000 Units SubCUTAneous Q8H    traMADol (ULTRAM) tablet 50 mg  50 mg Oral Q6H PRN    naloxone (NARCAN) injection 0.4 mg  0.4 mg IntraVENous PRN    QUEtiapine (SEROquel) tablet 50 mg  50 mg Oral QHS PRN       Care Plan discussed with: Patient/nurse    Total time spent with patient: 30 minutes.     Shandra Shah Favian Dutta MD

## 2017-07-24 NOTE — PROGRESS NOTES
[facility] [address]    Maria Fernanda B Self-Umlang  YOB: 1932          Assessment & Plan:   ARF on CKD4  · Felt secondary to IVVD  · Improved  · Stable off IVF    Hyperkalemia  · Resolved    Dementia with AMS    Anorexia  · Reports better intake       Subjective:   CC: f/u ARF  HPI: Renal function stable  ROS: no sob/n/v  Current Facility-Administered Medications   Medication Dose Route Frequency    levothyroxine (SYNTHROID) tablet 50 mcg  50 mcg Oral ACB    traZODone (DESYREL) tablet 25 mg  25 mg Oral QHS    venlafaxine-SR (EFFEXOR-XR) capsule 150 mg  150 mg Oral DAILY    heparin (porcine) injection 5,000 Units  5,000 Units SubCUTAneous Q8H    traMADol (ULTRAM) tablet 50 mg  50 mg Oral Q6H PRN    naloxone (NARCAN) injection 0.4 mg  0.4 mg IntraVENous PRN    QUEtiapine (SEROquel) tablet 50 mg  50 mg Oral QHS PRN          Objective:     Vitals:  Blood pressure 150/82, pulse 75, temperature 97.9 °F (36.6 °C), resp. rate 18, height 5' 3\" (1.6 m), weight 70.3 kg (155 lb), SpO2 98 %. Temp (24hrs), Av.2 °F (36.8 °C), Min:97.9 °F (36.6 °C), Max:98.5 °F (36.9 °C)      Intake and Output:      1901 -  0700  In: 240 [P.O.:240]  Out: 250 [Urine:250]    Physical Exam:               GENERAL ASSESSMENT: NAD  CHEST: CTA  HEART: S1S2  ABDOMEN: Soft,NT,  EXTREMITY: no EDEMA          ECG/rhythm:    Data Review      No results for input(s): TNIPOC in the last 72 hours. No lab exists for component: ITNL   No results for input(s): CPK, CKMB, TROIQ in the last 72 hours.   Recent Labs      17   0349  17   0252  17   0234   NA  141  138  141   K  3.7  3.7  3.8   CL  105  104  107   CO2  23  24  22   BUN  27*  27*  27*   CREA  2.84*  3.01*  2.89*   GLU  81  100  107*   MG  1.6  1.6  1.7   CA  9.1  8.9  8.4*   ALB  2.9*  2.8*  2.8*   WBC  8.3  8.3  7.3   HGB  11.1*  10.7*  10.5*   HCT  34.1*  32.8*  32.0*   PLT  304  306  298      No results for input(s): INR, PTP, APTT in the last 72 hours. No lab exists for component: INREXT, INREXT  Needs: urine analysis, urine sodium, protein and creatinine  Lab Results   Component Value Date/Time    Sodium urine, random 55 07/02/2017 02:41 AM    Creatinine, urine 187.78 12/21/2014 03:50 PM           : Donis Diane MD  7/24/2017        [facility]:  [email or web address]. [employer]  [place] office:  [address] [place] [address], [address] [place] [address],[address]  [address]  Phone: [phone]  Fax :     [phone]    [place] office:  [address] [facility]  Alan [place]gett  Phone - [phone]  Fax - [phone]

## 2017-07-24 NOTE — PROGRESS NOTES
Problem: Mobility Impaired (Adult and Pediatric)  Goal: *Therapy Goal (Edit Goal, Insert Text)  Physical Therapy Goals  Initiated 7/21/2017  1. Patient will move from supine to sit and sit to supine in bed with modified independence within 7 day(s). 2. Patient will transfer from bed to chair and chair to bed with modified independence using the least restrictive device within 7 day(s). 3. Patient will perform sit to stand with modified independence within 7 day(s). 4. Patient will ambulate with modified independence for 75 feet with the least restrictive device within 7 day(s). 5. Patient will perform HEP exercises with verbal cueing within 7 day(s). PHYSICAL THERAPY TREATMENT  Patient: Jori Bradley [de-identified]80 y.o. female)  Date: 7/24/2017  Diagnosis: NIMCO (acute kidney injury) ([facility] <principal problem not specified>       Precautions: Fall, DNR  Chart, physical therapy assessment, plan of care and goals were reviewed. ASSESSMENT:  Pt comes to sit with CGA. Pt CGA to stand. Pt ambulated 75ft with RW CGA. Pt ambulates with increased lateral sway secondary to a leg length discrepancy. Pt left sitting. Continue goals. .  Progression toward goals:  [ ]      Improving appropriately and progressing toward goals  [X]      Improving slowly and progressing toward goals  [ ]      Not making progress toward goals and plan of care will be adjusted       PLAN:  Patient continues to benefit from skilled intervention to address the above impairments. Continue treatment per established plan of care.   Discharge Recommendations:  Cristo Mcfarland  Further Equipment Recommendations for Discharge:  rolling walker       SUBJECTIVE:          OBJECTIVE DATA SUMMARY:   Critical Behavior:  Neurologic State: Alert, Confused  Orientation Level: Disoriented to situation, Disoriented to time  Cognition: Follows commands, Impaired decision making  Safety/Judgement: Awareness of environment, Decreased awareness of need for assistance, Decreased awareness of need for safety  Functional Mobility Training:  Bed Mobility:     Supine to Sit: Contact guard assistance  Sit to Supine: Contact guard assistance                       Transfers:  Sit to Stand: Contact guard assistance  Stand to Sit: Contact guard assistance        Bed to Chair: Minimum assistance                    Balance:  Sitting: Intact  Standing: Intact; With support  Standing - Static: Fair;Good  Standing - Dynamic : Fair  Ambulation/Gait Training:  Distance (ft): 75 Feet (ft)  Assistive Device: Walker, rolling;Gait belt  Ambulation - Level of Assistance: Contact guard assistance        Gait Abnormalities: Decreased step clearance        Base of Support: Narrowed        Step Length: Right shortened;Left shortened                               Pain:  Pain Scale 1: Numeric (0 - 10)  Pain Intensity 1: 0              Activity Tolerance:   Pt tolerated treatment well. Please refer to the flowsheet for vital signs taken during this treatment.   After treatment:   [X] Patient left in no apparent distress sitting up in chair  [ ] Patient left in no apparent distress in bed  [ ] Call bell left within reach  [ ] Nursing notified  [ ] Caregiver present  [ ] Bed alarm activated      COMMUNICATION/COLLABORATION:   The patients plan of care was discussed with: Physical Therapist     Leslie Dickson PTA   Time Calculation: 23 mins

## 2017-07-24 NOTE — DISCHARGE INSTRUCTIONS
Patient Discharge Instructions    Cassandra Schmidt / [id number] : 1932    Admitted 2017 Discharged: 2017 4:09 PM     ACUTE DIAGNOSES:  NIMCO (acute kidney injury) ([facility]    CHRONIC MEDICAL DIAGNOSES:  Problem List as of 2017  Date Reviewed: 2017          Codes Class Noted - Resolved    Hyperkalemia ICD-10-CM: E87.5  ICD-9-CM: 276.7  2017 - Present        Altered mental status ICD-10-CM: R41.82  ICD-9-CM: 780.97  2017 - Present        UTI (urinary tract infection) ICD-10-CM: N39.0  ICD-9-CM: 599.0  2017 - Present        Acute respiratory failure ([facility] ICD-10-CM: J96.00  ICD-9-CM: 518.81  2014 - Present        Pulmonary embolism ([facility] ICD-10-CM: I26.99  ICD-9-CM: 415.19  2014 - Present        NIMCO (acute kidney injury) ([facility] ICD-10-CM: N17.9  ICD-9-CM: 584.9  2014 - Present        Renal lesion ICD-10-CM: N28.9  ICD-9-CM: 593.9  2014 - Present        Autoimmune hemolytic anemia ([facility] ICD-10-CM: D59.1  ICD-9-CM: 283.0  2014 - Present        Leukocytosis ICD-10-CM: D72.829  ICD-9-CM: 288.60  10/25/2014 - Present        HTN (hypertension) ICD-10-CM: I10  ICD-9-CM: 401.9  10/19/2014 - Present        Depression ICD-10-CM: F32.9  ICD-9-CM: 638  10/19/2014 - Present        Hypothyroidism ICD-10-CM: E03.9  ICD-9-CM: 244.9  10/19/2014 - Present        Diarrhea ICD-10-CM: R19.7  ICD-9-CM: 787.91  10/19/2014 - Present        Leukopenia ICD-10-CM: D72.819  ICD-9-CM: 288.50  10/19/2014 - Present        Anemia ICD-10-CM: D64.9  ICD-9-CM: 285.9  10/19/2014 - Present        Transaminitis ICD-10-CM: R74.0  ICD-9-CM: 790.4  10/19/2014 - Present        Hyperbilirubinemia ICD-10-CM: E80.6  ICD-9-CM: 782.4  10/19/2014 - Present        Weakness ICD-10-CM: R53.1  ICD-9-CM: 780.79  10/18/2014 - Present              DISCHARGE MEDICATIONS:         · It is important that you take the medication exactly as they are prescribed.    · Keep your medication in the bottles provided by the pharmacist and keep a list of the medication names, dosages, and times to be taken in your wallet. · Do not take other medications without consulting your doctor. DIET:  Cardiac Diet    ACTIVITY: Activity as tolerated    ADDITIONAL INFORMATION: If you experience any of the following symptoms then please call your primary care physician or return to the emergency room if you cannot get hold of your doctor: Fever, chills, nausea, vomiting, diarrhea, change in mentation, falling, bleeding, shortness of breath. FOLLOW UP CARE:  Dr. Wes Crump MD  you are to call and set up an appointment to see them with in 1 week. Follow-up with specialists at directed by them      Information obtained by :  I understand that if any problems occur once I am at home I am to contact my physician. I understand and acknowledge receipt of the instructions indicated above.                                                                                                                                            Physician's or R.N.'s Signature                                                                  Date/Time                                                                                                                                              Patient or Representative Signature                                                          Date/Time

## 2017-07-24 NOTE — PROGRESS NOTES
BRENNEN will  the patient at 5pm today and daughter Selina Simmonds made aware of this. Slava Smith has called Dr. Mikhail Tolentino and made him aware of placement and he is working on the discharge paperwork and orders for the 5pm .  Leonardo So will complete the rest of the discharge process. / Kamaljit Serrano of Case Management

## 2017-07-24 NOTE — PROGRESS NOTES
Spoke with Sarah Gustafson at [facility] and they are still reviewing the case but they will not have a bed until at least tomorrow and that is if 2 patient's d/c . Advised Kandis Ibarra to call family to discuss additional options for d/c to SNF.  He says that daughter does not want [address] and I have asked him to call daughter and make her aware of patient's medical readiness for discharge per physician notes and that additional choices for SNF need to be made for a d/c plan of today/Dee Francisco Manager of Case Management

## 2017-07-24 NOTE — ROUTINE PROCESS
Bedside and Verbal shift change report given to Scooby Alvarado RN (oncoming nurse) by PROMISE Joshua (offgoing nurse). Report included the following information SBAR, Intake/Output, MAR, Accordion, Recent Results and Med Rec Status.

## 2017-07-25 LAB
BACTERIA SPEC CULT: NORMAL
SERVICE CMNT-IMP: NORMAL

## 2017-12-14 ENCOUNTER — HOSPITAL ENCOUNTER (EMERGENCY)
Age: 82
Discharge: HOME | End: 2017-12-14
Attending: STUDENT IN AN ORGANIZED HEALTH CARE EDUCATION/TRAINING PROGRAM | Admitting: STUDENT IN AN ORGANIZED HEALTH CARE EDUCATION/TRAINING PROGRAM
Payer: MEDICARE

## 2017-12-14 VITALS
RESPIRATION RATE: 18 BRPM | HEART RATE: 88 BPM | WEIGHT: 113 LBS | SYSTOLIC BLOOD PRESSURE: 135 MMHG | TEMPERATURE: 98.1 F | BODY MASS INDEX: 21.34 KG/M2 | DIASTOLIC BLOOD PRESSURE: 73 MMHG | OXYGEN SATURATION: 97 % | HEIGHT: 61 IN

## 2017-12-14 DIAGNOSIS — N30.01 ACUTE CYSTITIS WITH HEMATURIA: Primary | ICD-10-CM

## 2017-12-14 DIAGNOSIS — K59.09 OTHER CONSTIPATION: ICD-10-CM

## 2017-12-14 LAB
ALBUMIN SERPL-MCNC: 3.5 G/DL (ref 3.5–5)
ALBUMIN/GLOB SERPL: 1.2 {RATIO} (ref 1.1–2.2)
ALP SERPL-CCNC: 92 U/L (ref 45–117)
ALT SERPL-CCNC: 15 U/L (ref 12–78)
ANION GAP SERPL CALC-SCNC: 12 MMOL/L (ref 5–15)
APPEARANCE UR: ABNORMAL
AST SERPL-CCNC: 13 U/L (ref 15–37)
BACTERIA URNS QL MICRO: ABNORMAL /HPF
BASOPHILS # BLD: 0 K/UL (ref 0–0.1)
BASOPHILS NFR BLD: 0 % (ref 0–1)
BILIRUB SERPL-MCNC: 0.4 MG/DL (ref 0.2–1)
BILIRUB UR QL CFM: NEGATIVE
BUN SERPL-MCNC: 27 MG/DL (ref 6–20)
BUN/CREAT SERPL: 10 (ref 12–20)
CALCIUM SERPL-MCNC: 9.2 MG/DL (ref 8.5–10.1)
CHLORIDE SERPL-SCNC: 105 MMOL/L (ref 97–108)
CO2 SERPL-SCNC: 24 MMOL/L (ref 21–32)
COLOR UR: ABNORMAL
CREAT SERPL-MCNC: 2.6 MG/DL (ref 0.55–1.02)
EOSINOPHIL # BLD: 0.4 K/UL (ref 0–0.4)
EOSINOPHIL NFR BLD: 4 % (ref 0–7)
EPITH CASTS URNS QL MICRO: ABNORMAL /LPF
ERYTHROCYTE [DISTWIDTH] IN BLOOD BY AUTOMATED COUNT: 14.6 % (ref 11.5–14.5)
GLOBULIN SER CALC-MCNC: 2.9 G/DL (ref 2–4)
GLUCOSE SERPL-MCNC: 84 MG/DL (ref 65–100)
GLUCOSE UR STRIP.AUTO-MCNC: NEGATIVE MG/DL
HCT VFR BLD AUTO: 38.3 % (ref 35–47)
HGB BLD-MCNC: 12.3 G/DL (ref 11.5–16)
HGB UR QL STRIP: ABNORMAL
KETONES UR QL STRIP.AUTO: NEGATIVE MG/DL
LEUKOCYTE ESTERASE UR QL STRIP.AUTO: ABNORMAL
LYMPHOCYTES # BLD: 1.5 K/UL (ref 0.8–3.5)
LYMPHOCYTES NFR BLD: 19 % (ref 12–49)
MCH RBC QN AUTO: 28.3 PG (ref 26–34)
MCHC RBC AUTO-ENTMCNC: 32.1 G/DL (ref 30–36.5)
MCV RBC AUTO: 88 FL (ref 80–99)
MONOCYTES # BLD: 0.8 K/UL (ref 0–1)
MONOCYTES NFR BLD: 10 % (ref 5–13)
NEUTS SEG # BLD: 5.3 K/UL (ref 1.8–8)
NEUTS SEG NFR BLD: 67 % (ref 32–75)
NITRITE UR QL STRIP.AUTO: NEGATIVE
PH UR STRIP: 6 [PH] (ref 5–8)
PLATELET # BLD AUTO: 257 K/UL (ref 150–400)
POTASSIUM SERPL-SCNC: 2.8 MMOL/L (ref 3.5–5.1)
PROT SERPL-MCNC: 6.4 G/DL (ref 6.4–8.2)
PROT UR STRIP-MCNC: 30 MG/DL
RBC # BLD AUTO: 4.35 M/UL (ref 3.8–5.2)
RBC #/AREA URNS HPF: ABNORMAL /HPF (ref 0–5)
SODIUM SERPL-SCNC: 141 MMOL/L (ref 136–145)
SP GR UR REFRACTOMETRY: 1.02 (ref 1–1.03)
UR CULT HOLD, URHOLD: NORMAL
UROBILINOGEN UR QL STRIP.AUTO: 0.2 EU/DL (ref 0.2–1)
WBC # BLD AUTO: 8 K/UL (ref 3.6–11)
WBC URNS QL MICRO: ABNORMAL /HPF (ref 0–4)

## 2017-12-14 PROCEDURE — 77030005563 HC CATH URETH INT MMGH -A

## 2017-12-14 PROCEDURE — 81001 URINALYSIS AUTO W/SCOPE: CPT | Performed by: STUDENT IN AN ORGANIZED HEALTH CARE EDUCATION/TRAINING PROGRAM

## 2017-12-14 PROCEDURE — 87086 URINE CULTURE/COLONY COUNT: CPT | Performed by: STUDENT IN AN ORGANIZED HEALTH CARE EDUCATION/TRAINING PROGRAM

## 2017-12-14 PROCEDURE — 87077 CULTURE AEROBIC IDENTIFY: CPT | Performed by: STUDENT IN AN ORGANIZED HEALTH CARE EDUCATION/TRAINING PROGRAM

## 2017-12-14 PROCEDURE — 85025 COMPLETE CBC W/AUTO DIFF WBC: CPT | Performed by: STUDENT IN AN ORGANIZED HEALTH CARE EDUCATION/TRAINING PROGRAM

## 2017-12-14 PROCEDURE — 87186 SC STD MICRODIL/AGAR DIL: CPT | Performed by: STUDENT IN AN ORGANIZED HEALTH CARE EDUCATION/TRAINING PROGRAM

## 2017-12-14 PROCEDURE — 51701 INSERT BLADDER CATHETER: CPT

## 2017-12-14 PROCEDURE — 36415 COLL VENOUS BLD VENIPUNCTURE: CPT | Performed by: STUDENT IN AN ORGANIZED HEALTH CARE EDUCATION/TRAINING PROGRAM

## 2017-12-14 PROCEDURE — 80053 COMPREHEN METABOLIC PANEL: CPT | Performed by: STUDENT IN AN ORGANIZED HEALTH CARE EDUCATION/TRAINING PROGRAM

## 2017-12-14 PROCEDURE — 99283 EMERGENCY DEPT VISIT LOW MDM: CPT

## 2017-12-14 RX ORDER — NITROFURANTOIN 25; 75 MG/1; MG/1
100 CAPSULE ORAL 2 TIMES DAILY
Qty: 6 CAP | Refills: 0 | Status: SHIPPED | OUTPATIENT
Start: 2017-12-14 | End: 2017-12-17

## 2017-12-14 NOTE — ED PROVIDER NOTES
HPI Comments: 80 y.o. female with past medical history significant for HTN, PE, DVT, Hemolytic anemia, Asthma, Arthritis who presents from home via EMS with chief complaint of constipation and decreased urination. Per family, pt with hx of constipation typically 5-7 days up to 10 days at which time stool softeners are started. Pt has been constipated now for 15 days. Ducalox was started 4 days ago without stool. Family also notes upon waking patient this morning, her brief was dry which is atypical. She had not urinated over night. A new brief was placed. Upon taking it off in ED average amount of malodorous urine noted. Pt with stage IV kidney disease who family notes does not wish to persue dialysis. She is followed by a nephrologist (unsure of name at this time) who is monitoring symptoms, her next appointment is in January. Furthermore, pt sustained two GLF Sunday and Monday. No known head injury. She also has a bedsore which her PCP is following. She was prescribed an antibiotic for this last week. There are no other acute medical concerns at this time. Full history, physical exam, and ROS unable to be obtained due to:  Poor historian. PCP: Oni Pena MD    Note written by Jannette Olson, as dictated by Camacho Gilliam MD 5:37 PM    The history is provided by the patient and a relative. No  was used. Past Medical History:   Diagnosis Date    Arthritis     Asthma     Autoimmune hemolytic anemia (HCC)     Depression     Heart murmur     Hypertension     Hypothyroidism     Pulmonary embolism (HCC)     Thromboembolus (HCC)     Vertigo        Past Surgical History:   Procedure Laterality Date    HX COLONOSCOPY      HX HEENT      HX HYSTERECTOMY      HX ORTHOPAEDIC  R total hip    HX REFRACTIVE SURGERY           No family history on file.     Social History     Social History    Marital status:      Spouse name: N/A    Number of children: N/A    Years of education: N/A     Occupational History    Not on file. Social History Main Topics    Smoking status: Never Smoker    Smokeless tobacco: Never Used    Alcohol use No    Drug use: No    Sexual activity: Not on file     Other Topics Concern    Not on file     Social History Narrative         ALLERGIES: Percocet [oxycodone-acetaminophen]; Pcn [penicillins]; and Sulfa (sulfonamide antibiotics)    Review of Systems   Reason unable to perform ROS: Poor historian. Vitals:    12/14/17 1642   BP: 144/70   Pulse: 86   Resp: 20   Temp: 98.1 °F (36.7 °C)   SpO2: 97%   Weight: 51.3 kg (113 lb)   Height: 5' 1\" (1.549 m)            Physical Exam   Constitutional: She appears well-developed and well-nourished. No distress. HENT:   Head: Normocephalic and atraumatic. Nose: Nose normal.   Mouth/Throat: Oropharynx is clear and moist. No oropharyngeal exudate. Eyes: Conjunctivae and EOM are normal. Right eye exhibits no discharge. Left eye exhibits no discharge. No scleral icterus. Neck: Normal range of motion. Neck supple. No JVD present. No tracheal deviation present. No thyromegaly present. Cardiovascular: Normal rate, regular rhythm, normal heart sounds and intact distal pulses. Exam reveals no gallop and no friction rub. No murmur heard. Pulmonary/Chest: Effort normal and breath sounds normal. No stridor. No respiratory distress. She has no wheezes. She has no rales. She exhibits no tenderness. Abdominal: She exhibits distension (slightly). She exhibits no mass. Bowel sounds are decreased. There is no tenderness. There is no rebound. Musculoskeletal: Normal range of motion. She exhibits no edema or tenderness. Lymphadenopathy:     She has no cervical adenopathy. Neurological: She is alert. No cranial nerve deficit. Coordination normal.   Oriented to self   Skin: Skin is warm and dry. No rash noted. She is not diaphoretic. No erythema. No pallor.    Psychiatric: She has a normal mood and affect. Her behavior is normal. Judgment and thought content normal.     Note written by Jannette Ribeiro, as dictated by Beto Griffin MD 5:42 PM    MDM  Number of Diagnoses or Management Options  Acute cystitis with hematuria:   Other constipation:   Diagnosis management comments: UTI, slow transit constipation, electrolyte abnormality, urinary obstruction. 79 y/o female presenting to ED for likely uti as well as constipation. Pt has no other complaints and is otherwise well appearing. Plan:  CBC, CMP, ua, urine culture. Reassessment. Urine concerning for UTI, will send culture and start pt on macrobid awaiting cultures. Amount and/or Complexity of Data Reviewed  Clinical lab tests: ordered and reviewed  Obtain history from someone other than the patient: yes  Review and summarize past medical records: yes    Risk of Complications, Morbidity, and/or Mortality  Presenting problems: moderate  Diagnostic procedures: moderate  Management options: moderate    Patient Progress  Patient progress: stable    ED Course       Procedures    3:47 PM  The patient has been reevaluated. The patient is ready for discharge. The patient's signs, symptoms, diagnosis, and discharge instructions have been discussed and the patient/ family has conveyed their understanding. The patient is to follow up as recommended or return to the ED should their symptoms worsen. Plan has been discussed and the patient is in agreement. LABORATORY TESTS:  No results found for this or any previous visit (from the past 12 hour(s)). IMAGING RESULTS:  No orders to display     No results found. MEDICATIONS GIVEN:  Medications - No data to display    IMPRESSION:  1. Acute cystitis with hematuria    2. Other constipation        PLAN:  1.    Discharge Medication List as of 12/14/2017  7:13 PM      START taking these medications    Details   nitrofurantoin, macrocrystal-monohydrate, (MACROBID) 100 mg capsule Take 1 Cap by mouth two (2) times a day for 3 days. , Print, Disp-6 Cap, R-0         CONTINUE these medications which have NOT CHANGED    Details   traZODone (DESYREL) 50 mg tablet Take 0.5 Tabs by mouth nightly., No Print, Disp-30 Tab, R-0      buPROPion XL (WELLBUTRIN XL) 300 mg XL tablet Take 300 mg by mouth every morning., Historical Med      !! venlafaxine-SR (EFFEXOR-XR) 75 mg capsule Take 75 mg by mouth daily. In addition to 150 mg daily. Total 225 mg daily. , Historical Med      !! venlafaxine-SR (EFFEXOR XR) 150 mg capsule Take 150 mg by mouth daily. In addition to 75 mg daily. Total 225 mg daily. , Historical Med      levothyroxine (LEVOTHROID) 50 mcg tablet Take 50 mcg by mouth Daily (before breakfast). , Historical Med      albuterol (PROAIR HFA) 90 mcg/actuation inhaler Take 2 puffs by inhalation every four (4) hours as needed., Historical Med      CARBAMIDE PEROXIDE (EAR WAX DROPS OT) 1 drop by Otic route daily as needed., Historical Med       !! - Potential duplicate medications found. Please discuss with provider.         2.   Follow-up Information     Follow up With Details Comments Leidy Chaudhary MD  If symptoms worsen [facility] [address]  [phone]      [facility] EMERGENCY DEPT  If symptoms worsen [address] [facility]  [phone]            Return to ED for new or worsening symptoms       Chary Tiwari MD

## 2017-12-14 NOTE — ED TRIAGE NOTES
Daughter states pt has not had a BM x 15 days. Also c/o decreased urine output. Pt has history of dementia. Alert and oriented x 1, self only- norm states family.

## 2017-12-15 NOTE — DISCHARGE INSTRUCTIONS
Constipation: Care Instructions  Your Care Instructions    Constipation means that you have a hard time passing stools (bowel movements). People pass stools from 3 times a day to once every 3 days. What is normal for you may be different. Constipation may occur with pain in the rectum and cramping. The pain may get worse when you try to pass stools. Sometimes there are small amounts of bright red blood on toilet paper or the surface of stools. This is because of enlarged veins near the rectum (hemorrhoids). A few changes in your diet and lifestyle may help you avoid ongoing constipation. Your doctor may also prescribe medicine to help loosen your stool. Some medicines can cause constipation. These include pain medicines and antidepressants. Tell your doctor about all the medicines you take. Your doctor may want to make a medicine change to ease your symptoms. Follow-up care is a key part of your treatment and safety. Be sure to make and go to all appointments, and call your doctor if you are having problems. It's also a good idea to know your test results and keep a list of the medicines you take. How can you care for yourself at home? · Drink plenty of fluids, enough so that your urine is light yellow or clear like water. If you have kidney, heart, or liver disease and have to limit fluids, talk with your doctor before you increase the amount of fluids you drink. · Include high-fiber foods in your diet each day. These include fruits, vegetables, beans, and whole grains. · Get at least 30 minutes of exercise on most days of the week. Walking is a good choice. You also may want to do other activities, such as running, swimming, cycling, or playing tennis or team sports. · Take a fiber supplement, such as Citrucel or Metamucil, every day. Read and follow all instructions on the label. · Schedule time each day for a bowel movement. A daily routine may help.  Take your time having your bowel movement. · Support your feet with a small step stool when you sit on the toilet. This helps flex your hips and places your pelvis in a squatting position. · Your doctor may recommend an over-the-counter laxative to relieve your constipation. Examples are Milk of Magnesia and MiraLax. Read and follow all instructions on the label. Do not use laxatives on a long-term basis. When should you call for help? Call your doctor now or seek immediate medical care if:  ? · You have new or worse belly pain. ? · You have new or worse nausea or vomiting. ? · You have blood in your stools. ? Watch closely for changes in your health, and be sure to contact your doctor if:  ? · Your constipation is getting worse. ? · You do not get better as expected. Where can you learn more? Go to http://bernardino-stanley.info/. Enter 21 [phone] in the search box to learn more about \"Constipation: Care Instructions. \"  Current as of: March 20, 2017  Content Version: 11.4  © 9687-3627 [employer]. Care instructions adapted under license by [employer] (which disclaims liability or warranty for this information). If you have questions about a medical condition or this instruction, always ask your healthcare professional. [address] any warranty or liability for your use of this information. Urinary Tract Infection in Women: Care Instructions  Your Care Instructions    A urinary tract infection, or UTI, is a general term for an infection anywhere between the kidneys and the urethra (where urine comes out). Most UTIs are bladder infections. They often cause pain or burning when you urinate. UTIs are caused by bacteria and can be cured with antibiotics. Be sure to complete your treatment so that the infection goes away. Follow-up care is a key part of your treatment and safety. Be sure to make and go to all appointments, and call your doctor if you are having problems. It's also a good idea to know your test results and keep a list of the medicines you take. How can you care for yourself at home? · Take your antibiotics as directed. Do not stop taking them just because you feel better. You need to take the full course of antibiotics. · Drink extra water and other fluids for the next day or two. This may help wash out the bacteria that are causing the infection. (If you have kidney, heart, or liver disease and have to limit fluids, talk with your doctor before you increase your fluid intake.)  · Avoid drinks that are carbonated or have caffeine. They can irritate the bladder. · Urinate often. Try to empty your bladder each time. · To relieve pain, take a hot bath or lay a heating pad set on low over your lower belly or genital area. Never go to sleep with a heating pad in place. To prevent UTIs  · Drink plenty of water each day. This helps you urinate often, which clears bacteria from your system. (If you have kidney, heart, or liver disease and have to limit fluids, talk with your doctor before you increase your fluid intake.)  · Urinate when you need to. · Urinate right after you have sex. · Change sanitary pads often. · Avoid douches, bubble baths, feminine hygiene sprays, and other feminine hygiene products that have deodorants. · After going to the bathroom, wipe from front to back. When should you call for help? Call your doctor now or seek immediate medical care if:  ? · Symptoms such as fever, chills, nausea, or vomiting get worse or appear for the first time. ? · You have new pain in your back just below your rib cage. This is called flank pain. ? · There is new blood or pus in your urine. ? · You have any problems with your antibiotic medicine. ? Watch closely for changes in your health, and be sure to contact your doctor if:  ? · You are not getting better after taking an antibiotic for 2 days. ? · Your symptoms go away but then come back.    Where can you learn more? Go to http://bernardino-stanley.info/. Enter B240 in the search box to learn more about \"Urinary Tract Infection in Women: Care Instructions. \"  Current as of: May 12, 2017  Content Version: 11.4  © 9972-0412 [employer]. Care instructions adapted under license by [employer] (which disclaims liability or warranty for this information). If you have questions about a medical condition or this instruction, always ask your healthcare professional. [address] any warranty or liability for your use of this information.

## 2017-12-15 NOTE — ED NOTES
The patient was given discharge instructions and questions were answered. Patient is in no apparent distress at time of discharge. Assisted into wheelchair by RN and Transporter, taken to [place] area where family members will drive her home.

## 2017-12-16 LAB
BACTERIA SPEC CULT: ABNORMAL
CC UR VC: ABNORMAL
SERVICE CMNT-IMP: ABNORMAL

## 2018-01-08 ENCOUNTER — HOSPITAL ENCOUNTER (INPATIENT)
Age: 83
LOS: 4 days | Discharge: NURSING, REHAB OR LONG-TERM CARE | DRG: 640 | End: 2018-01-12
Attending: EMERGENCY MEDICINE | Admitting: INTERNAL MEDICINE
Payer: MEDICARE

## 2018-01-08 ENCOUNTER — APPOINTMENT (OUTPATIENT)
Dept: CT IMAGING | Age: 83
DRG: 640 | End: 2018-01-08
Attending: EMERGENCY MEDICINE
Payer: MEDICARE

## 2018-01-08 ENCOUNTER — APPOINTMENT (OUTPATIENT)
Dept: GENERAL RADIOLOGY | Age: 83
DRG: 640 | End: 2018-01-08
Attending: EMERGENCY MEDICINE
Payer: MEDICARE

## 2018-01-08 DIAGNOSIS — K59.01 SLOW TRANSIT CONSTIPATION: ICD-10-CM

## 2018-01-08 DIAGNOSIS — R62.7 FAILURE TO THRIVE IN ADULT: ICD-10-CM

## 2018-01-08 DIAGNOSIS — R53.81 DEBILITY: ICD-10-CM

## 2018-01-08 DIAGNOSIS — E86.0 DEHYDRATION: ICD-10-CM

## 2018-01-08 DIAGNOSIS — Z71.89 GOALS OF CARE, COUNSELING/DISCUSSION: ICD-10-CM

## 2018-01-08 DIAGNOSIS — R11.2 NAUSEA AND VOMITING, INTRACTABILITY OF VOMITING NOT SPECIFIED, UNSPECIFIED VOMITING TYPE: Primary | ICD-10-CM

## 2018-01-08 DIAGNOSIS — T50.905A ADVERSE DRUG EFFECT, INITIAL ENCOUNTER: ICD-10-CM

## 2018-01-08 PROBLEM — F03.90 DEMENTIA (HCC): Status: ACTIVE | Noted: 2018-01-08

## 2018-01-08 PROBLEM — N18.4 CKD (CHRONIC KIDNEY DISEASE) STAGE 4, GFR 15-29 ML/MIN (HCC): Status: ACTIVE | Noted: 2018-01-08

## 2018-01-08 LAB
ALBUMIN SERPL-MCNC: 3.2 G/DL (ref 3.5–5)
ALBUMIN/GLOB SERPL: 1.1 {RATIO} (ref 1.1–2.2)
ALP SERPL-CCNC: 78 U/L (ref 45–117)
ALT SERPL-CCNC: 8 U/L (ref 12–78)
ANION GAP SERPL CALC-SCNC: 12 MMOL/L (ref 5–15)
APPEARANCE UR: CLEAR
AST SERPL-CCNC: 11 U/L (ref 15–37)
BACTERIA URNS QL MICRO: NEGATIVE /HPF
BASOPHILS # BLD: 0 K/UL (ref 0–0.1)
BASOPHILS NFR BLD: 0 % (ref 0–1)
BILIRUB SERPL-MCNC: 0.4 MG/DL (ref 0.2–1)
BILIRUB UR QL: NEGATIVE
BUN SERPL-MCNC: 31 MG/DL (ref 6–20)
BUN/CREAT SERPL: 12 (ref 12–20)
CALCIUM SERPL-MCNC: 9.2 MG/DL (ref 8.5–10.1)
CHLORIDE SERPL-SCNC: 102 MMOL/L (ref 97–108)
CO2 SERPL-SCNC: 25 MMOL/L (ref 21–32)
COLOR UR: ABNORMAL
CREAT SERPL-MCNC: 2.64 MG/DL (ref 0.55–1.02)
DIFFERENTIAL METHOD BLD: ABNORMAL
EOSINOPHIL # BLD: 1.1 K/UL (ref 0–0.4)
EOSINOPHIL NFR BLD: 10 % (ref 0–7)
EPITH CASTS URNS QL MICRO: ABNORMAL /LPF
ERYTHROCYTE [DISTWIDTH] IN BLOOD BY AUTOMATED COUNT: 14.8 % (ref 11.5–14.5)
GLOBULIN SER CALC-MCNC: 3 G/DL (ref 2–4)
GLUCOSE SERPL-MCNC: 86 MG/DL (ref 65–100)
GLUCOSE UR STRIP.AUTO-MCNC: NEGATIVE MG/DL
HCT VFR BLD AUTO: 40.1 % (ref 35–47)
HGB BLD-MCNC: 12.7 G/DL (ref 11.5–16)
HGB UR QL STRIP: ABNORMAL
KETONES UR QL STRIP.AUTO: 40 MG/DL
LEUKOCYTE ESTERASE UR QL STRIP.AUTO: NEGATIVE
LIPASE SERPL-CCNC: 42 U/L (ref 73–393)
LYMPHOCYTES # BLD: 0.3 K/UL (ref 0.8–3.5)
LYMPHOCYTES NFR BLD: 3 % (ref 12–49)
MAGNESIUM SERPL-MCNC: 1.7 MG/DL (ref 1.6–2.4)
MCH RBC QN AUTO: 27.7 PG (ref 26–34)
MCHC RBC AUTO-ENTMCNC: 31.7 G/DL (ref 30–36.5)
MCV RBC AUTO: 87.6 FL (ref 80–99)
MONOCYTES # BLD: 0.9 K/UL (ref 0–1)
MONOCYTES NFR BLD: 8 % (ref 5–13)
NEUTS SEG # BLD: 8.5 K/UL (ref 1.8–8)
NEUTS SEG NFR BLD: 79 % (ref 32–75)
NITRITE UR QL STRIP.AUTO: POSITIVE
PH UR STRIP: 6 [PH] (ref 5–8)
PLATELET # BLD AUTO: 198 K/UL (ref 150–400)
POTASSIUM SERPL-SCNC: 3.3 MMOL/L (ref 3.5–5.1)
PROT SERPL-MCNC: 6.2 G/DL (ref 6.4–8.2)
PROT UR STRIP-MCNC: 30 MG/DL
RBC # BLD AUTO: 4.58 M/UL (ref 3.8–5.2)
RBC #/AREA URNS HPF: ABNORMAL /HPF (ref 0–5)
RBC MORPH BLD: ABNORMAL
SODIUM SERPL-SCNC: 139 MMOL/L (ref 136–145)
SP GR UR REFRACTOMETRY: 1.02 (ref 1–1.03)
TROPONIN I SERPL-MCNC: <0.04 NG/ML
TROPONIN I SERPL-MCNC: <0.04 NG/ML
UA: UC IF INDICATED,UAUC: ABNORMAL
UROBILINOGEN UR QL STRIP.AUTO: 0.2 EU/DL (ref 0.2–1)
WBC # BLD AUTO: 10.8 K/UL (ref 3.6–11)
WBC URNS QL MICRO: ABNORMAL /HPF (ref 0–4)

## 2018-01-08 PROCEDURE — 74011000258 HC RX REV CODE- 258: Performed by: INTERNAL MEDICINE

## 2018-01-08 PROCEDURE — 80053 COMPREHEN METABOLIC PANEL: CPT | Performed by: EMERGENCY MEDICINE

## 2018-01-08 PROCEDURE — 85025 COMPLETE CBC W/AUTO DIFF WBC: CPT | Performed by: EMERGENCY MEDICINE

## 2018-01-08 PROCEDURE — 96360 HYDRATION IV INFUSION INIT: CPT

## 2018-01-08 PROCEDURE — 83690 ASSAY OF LIPASE: CPT | Performed by: EMERGENCY MEDICINE

## 2018-01-08 PROCEDURE — 65270000029 HC RM PRIVATE

## 2018-01-08 PROCEDURE — 81001 URINALYSIS AUTO W/SCOPE: CPT | Performed by: EMERGENCY MEDICINE

## 2018-01-08 PROCEDURE — 74011250636 HC RX REV CODE- 250/636: Performed by: EMERGENCY MEDICINE

## 2018-01-08 PROCEDURE — 77030011943

## 2018-01-08 PROCEDURE — 93005 ELECTROCARDIOGRAM TRACING: CPT

## 2018-01-08 PROCEDURE — 84484 ASSAY OF TROPONIN QUANT: CPT | Performed by: EMERGENCY MEDICINE

## 2018-01-08 PROCEDURE — 74022 RADEX COMPL AQT ABD SERIES: CPT

## 2018-01-08 PROCEDURE — 74011000258 HC RX REV CODE- 258: Performed by: EMERGENCY MEDICINE

## 2018-01-08 PROCEDURE — 83735 ASSAY OF MAGNESIUM: CPT | Performed by: EMERGENCY MEDICINE

## 2018-01-08 PROCEDURE — 51701 INSERT BLADDER CATHETER: CPT

## 2018-01-08 PROCEDURE — 74011250636 HC RX REV CODE- 250/636: Performed by: INTERNAL MEDICINE

## 2018-01-08 PROCEDURE — 99284 EMERGENCY DEPT VISIT MOD MDM: CPT

## 2018-01-08 PROCEDURE — 74176 CT ABD & PELVIS W/O CONTRAST: CPT

## 2018-01-08 PROCEDURE — 65660000000 HC RM CCU STEPDOWN

## 2018-01-08 PROCEDURE — 36415 COLL VENOUS BLD VENIPUNCTURE: CPT | Performed by: EMERGENCY MEDICINE

## 2018-01-08 RX ORDER — HEPARIN SODIUM 5000 [USP'U]/ML
5000 INJECTION, SOLUTION INTRAVENOUS; SUBCUTANEOUS EVERY 8 HOURS
Status: DISCONTINUED | OUTPATIENT
Start: 2018-01-08 | End: 2018-01-12 | Stop reason: HOSPADM

## 2018-01-08 RX ORDER — SODIUM CHLORIDE 0.9 % (FLUSH) 0.9 %
5-10 SYRINGE (ML) INJECTION EVERY 8 HOURS
Status: DISCONTINUED | OUTPATIENT
Start: 2018-01-08 | End: 2018-01-12 | Stop reason: HOSPADM

## 2018-01-08 RX ORDER — NITROFURANTOIN 25; 75 MG/1; MG/1
100 CAPSULE ORAL 2 TIMES DAILY
COMMUNITY
Start: 2018-01-06 | End: 2018-01-12

## 2018-01-08 RX ORDER — LEVOTHYROXINE SODIUM 50 UG/1
50 TABLET ORAL
Status: DISCONTINUED | OUTPATIENT
Start: 2018-01-09 | End: 2018-01-12 | Stop reason: HOSPADM

## 2018-01-08 RX ORDER — AMOXICILLIN 250 MG
1 CAPSULE ORAL 2 TIMES DAILY
Status: DISCONTINUED | OUTPATIENT
Start: 2018-01-08 | End: 2018-01-12 | Stop reason: HOSPADM

## 2018-01-08 RX ORDER — ACETAMINOPHEN 500 MG
1000 TABLET ORAL
COMMUNITY

## 2018-01-08 RX ORDER — POTASSIUM CHLORIDE 7.45 MG/ML
10 INJECTION INTRAVENOUS
Status: DISCONTINUED | OUTPATIENT
Start: 2018-01-08 | End: 2018-01-08

## 2018-01-08 RX ORDER — MAGNESIUM SULFATE HEPTAHYDRATE 40 MG/ML
2 INJECTION, SOLUTION INTRAVENOUS
Status: COMPLETED | OUTPATIENT
Start: 2018-01-08 | End: 2018-01-12

## 2018-01-08 RX ORDER — LORAZEPAM 0.5 MG/1
0.5 TABLET ORAL DAILY
COMMUNITY
End: 2018-01-12

## 2018-01-08 RX ORDER — GUAIFENESIN 100 MG/5ML
81 LIQUID (ML) ORAL DAILY
Status: DISCONTINUED | OUTPATIENT
Start: 2018-01-09 | End: 2018-01-12 | Stop reason: HOSPADM

## 2018-01-08 RX ORDER — BUPROPION HYDROCHLORIDE 150 MG/1
300 TABLET ORAL DAILY
Status: DISCONTINUED | OUTPATIENT
Start: 2018-01-09 | End: 2018-01-12 | Stop reason: HOSPADM

## 2018-01-08 RX ORDER — SODIUM CHLORIDE 0.9 % (FLUSH) 0.9 %
5-10 SYRINGE (ML) INJECTION AS NEEDED
Status: DISCONTINUED | OUTPATIENT
Start: 2018-01-08 | End: 2018-01-12 | Stop reason: HOSPADM

## 2018-01-08 RX ORDER — ACETAMINOPHEN AND CODEINE PHOSPHATE 300; 30 MG/1; MG/1
1 TABLET ORAL
COMMUNITY
End: 2018-01-12

## 2018-01-08 RX ORDER — POLYETHYLENE GLYCOL 3350 17 G/17G
17 POWDER, FOR SOLUTION ORAL 2 TIMES DAILY
Status: DISCONTINUED | OUTPATIENT
Start: 2018-01-08 | End: 2018-01-12 | Stop reason: HOSPADM

## 2018-01-08 RX ORDER — SODIUM CHLORIDE 9 MG/ML
100 INJECTION, SOLUTION INTRAVENOUS CONTINUOUS
Status: DISCONTINUED | OUTPATIENT
Start: 2018-01-08 | End: 2018-01-11

## 2018-01-08 RX ADMIN — CEFTRIAXONE SODIUM 1 G: 1 INJECTION, POWDER, FOR SOLUTION INTRAMUSCULAR; INTRAVENOUS at 17:39

## 2018-01-08 RX ADMIN — SODIUM CHLORIDE 10 MEQ: 900 INJECTION, SOLUTION INTRAVENOUS at 16:41

## 2018-01-08 RX ADMIN — HEPARIN SODIUM 5000 UNITS: 5000 INJECTION, SOLUTION INTRAVENOUS; SUBCUTANEOUS at 17:40

## 2018-01-08 RX ADMIN — Medication 10 ML: at 18:41

## 2018-01-08 RX ADMIN — SODIUM CHLORIDE 100 ML/HR: 900 INJECTION, SOLUTION INTRAVENOUS at 18:34

## 2018-01-08 RX ADMIN — SODIUM CHLORIDE 1000 ML: 9 INJECTION, SOLUTION INTRAVENOUS at 15:00

## 2018-01-08 RX ADMIN — MAGNESIUM SULFATE HEPTAHYDRATE 2 G: 40 INJECTION, SOLUTION INTRAVENOUS at 18:33

## 2018-01-08 NOTE — IP AVS SNAPSHOT
[address] 
[phone] Patient: Madelin Dutton MRN: [id number] NCP:40/6/9801 About your hospitalization You were admitted on:  January 8, 2018 You last received care in the:  [facility] 5M1 MED SURG 1 You were discharged on:  January 12, 2018 Why you were hospitalized Your primary diagnosis was:  Nausea And Vomiting Your diagnoses also included:  Failure To Thrive In Adult, Uti (Urinary Tract Infection), Hypothyroidism, Htn (Hypertension), Ckd (Chronic Kidney Disease) Stage 4, Gfr 15-29 Ml/Min (Hcc), Dementia Follow-up Information Follow up With Details Comments Contact Info [address] [facility] and Torie [address] [place] [address] Phone:  [phone] Eli Veliz MD   [facility] [address] [place] [address] 
[phone] Discharge Orders None A check rolf indicates which time of day the medication should be taken. My Medications START taking these medications Instructions Each Dose to Equal  
 Morning Noon Evening Bedtime  
 aspirin 81 mg chewable tablet Start taking on:  1/13/2018 Your last dose was: Your next dose is: Take 1 Tab by mouth daily. 81 mg  
    
   
   
   
  
 potassium chloride SR 10 mEq tablet Commonly known as:  KLOR-CON 10 Your last dose was: Your next dose is: Take 1 Tab by mouth two (2) times a day. 10 mEq CONTINUE taking these medications Instructions Each Dose to Equal  
 Morning Noon Evening Bedtime * EFFEXOR  mg capsule Generic drug:  venlafaxine-SR Your last dose was: Your next dose is: Take 150 mg by mouth daily. In addition to 75 mg daily. Total 225 mg daily. 150 mg  
    
   
   
   
  
 * venlafaxine-SR 75 mg capsule Commonly known as:  EFFEXOR-XR Your last dose was: Your next dose is: Take 75 mg by mouth daily. In addition to 150 mg daily. Total 225 mg daily. 75 mg LEVOTHROID 50 mcg tablet Generic drug:  levothyroxine Your last dose was: Your next dose is: Take 50 mcg by mouth Daily (before breakfast). 50 mcg  
    
   
   
   
  
 traZODone 50 mg tablet Commonly known as:  Ori [place] Your last dose was: Your next dose is: Take 0.5 Tabs by mouth nightly. 25 mg  
    
   
   
   
  
 TYLENOL EXTRA STRENGTH 500 mg tablet Generic drug:  acetaminophen Your last dose was: Your next dose is: Take 1,000 mg by mouth every six (6) hours as needed (Mild-Moderate pain). 1000 mg WELLBUTRIN  mg XL tablet Generic drug:  buPROPion XL Your last dose was: Your next dose is: Take 300 mg by mouth every morning. 300 mg * Notice: This list has 2 medication(s) that are the same as other medications prescribed for you. Read the directions carefully, and ask your doctor or other care provider to review them with you. STOP taking these medications LORazepam 0.5 mg tablet Commonly known as:  ATIVAN  
   
  
 MACROBID 100 mg capsule Generic drug:  nitrofurantoin (macrocrystal-monohydrate) TYLENOL-CODEINE #3 300-30 mg per tablet Generic drug:  acetaminophen-codeine Where to Get Your Medications Information on where to get these meds will be given to you by the nurse or doctor. ! Ask your nurse or doctor about these medications  
  aspirin 81 mg chewable tablet  
 potassium chloride SR 10 mEq tablet  
 traZODone 50 mg tablet Discharge Instructions Patient Discharge Instructions Tyler Umanzor / [id number] : 1932 Admitted 1/8/2018 Discharged: 1/12/2018 2:02 PM  
 
ACUTE DIAGNOSES: 
Nausea and vomiting CHRONIC MEDICAL DIAGNOSES: 
Problem List as of 1/12/2018  Date Reviewed: 1/8/2018 Codes Class Noted - Resolved * (Principal)Nausea and vomiting ICD-10-CM: R11.2 ICD-9-CM: 787.01  1/8/2018 - Present Failure to thrive in adult ICD-10-CM: R62.7 ICD-9-CM: 783.7  1/8/2018 - Present CKD (chronic kidney disease) stage 4, GFR 15-29 ml/min (HCC) ICD-10-CM: N18.4 ICD-9-CM: 585.4  1/8/2018 - Present Dementia ICD-10-CM: F03.90 ICD-9-CM: 294.20  1/8/2018 - Present Hyperkalemia ICD-10-CM: E87.5 ICD-9-CM: 276.7  7/17/2017 - Present Altered mental status ICD-10-CM: R41.82 
ICD-9-CM: 780.97  7/17/2017 - Present UTI (urinary tract infection) ICD-10-CM: N39.0 ICD-9-CM: 599.0  7/2/2017 - Present Acute respiratory failure ([facility] ICD-10-CM: J96.00 
ICD-9-CM: 518.81  12/21/2014 - Present Pulmonary embolism ([facility] ICD-10-CM: I26.99 
ICD-9-CM: 415.19  12/11/2014 - Present NIMCO (acute kidney injury) ([facility] ICD-10-CM: N17.9 ICD-9-CM: 584.9  12/11/2014 - Present Renal lesion ICD-10-CM: N28.9 ICD-9-CM: 593.9  11/5/2014 - Present Autoimmune hemolytic anemia (HCC) ICD-10-CM: D59.1 ICD-9-CM: 283.0  11/5/2014 - Present Leukocytosis ICD-10-CM: J92.492 ICD-9-CM: 288.60  10/25/2014 - Present HTN (hypertension) ICD-10-CM: I10 
ICD-9-CM: 401.9  10/19/2014 - Present Depression ICD-10-CM: F32.9 ICD-9-CM: 442  10/19/2014 - Present Hypothyroidism ICD-10-CM: E03.9 ICD-9-CM: 244.9  10/19/2014 - Present Diarrhea ICD-10-CM: R19.7 ICD-9-CM: 787.91  10/19/2014 - Present Leukopenia ICD-10-CM: D72.819 ICD-9-CM: 288.50  10/19/2014 - Present Anemia ICD-10-CM: D64.9 ICD-9-CM: 285.9  10/19/2014 - Present Transaminitis ICD-10-CM: R74.0 ICD-9-CM: 790.4  10/19/2014 - Present Hyperbilirubinemia ICD-10-CM: E80.6 ICD-9-CM: 782.4  10/19/2014 - Present Weakness ICD-10-CM: R53.1 ICD-9-CM: 780.79  10/18/2014 - Present DISCHARGE MEDICATIONS:  
 
 
 
· It is important that you take the medication exactly as they are prescribed. · Keep your medication in the bottles provided by the pharmacist and keep a list of the medication names, dosages, and times to be taken in your wallet. · Do not take other medications without consulting your doctor. DIET:  Regular Diet ACTIVITY: Activity as tolerated ADDITIONAL INFORMATION: If you experience any of the following symptoms then please call your primary care physician or return to the emergency room if you cannot get hold of your doctor: Fever, chills, nausea, vomiting, diarrhea, change in mentation, falling, bleeding, shortness of breath. FOLLOW UP CARE: 
Dr. Juan Luis Finch MD  you are to call and set up an appointment to see them with in 1 week. Follow-up with specialists at directed by them Information obtained by : 
I understand that if any problems occur once I am at home I am to contact my physician. I understand and acknowledge receipt of the instructions indicated above. Physician's or R.N.'s Signature                                                                  Date/Time Patient or Representative Signature                                                          Date/Time [employer] Announcement We are excited to announce that we are making your provider's discharge notes available to you in [employer].   You will see these notes when they are completed and signed by the physician that discharged you from your recent hospital stay. If you have any questions or concerns about any information you see in [employer], please call the Health Information Department where you were seen or reach out to your Primary Care Provider for more information about your plan of care. Introducing [facility]! Arnulfoamrit Sonya introduces [employer] patient portal. Now you can access parts of your medical record, email your doctor's office, and request medication refills online. 1. In your internet browser, go to [email or web address] 2. Click on the First Time User? Click Here link in the Sign In box. You will see the New Member Sign Up page. 3. Enter your [employer] Access Code exactly as it appears below. You will not need to use this code after youve completed the sign-up process. If you do not sign up before the expiration date, you must request a new code. · [employer] Access Code: [email or web address] Expires: 1/16/2018 12:48 PM 
 
4. Enter the last four digits of your Social Security Number (xxxx) and Date of Birth (mm/dd/yyyy) as indicated and click Submit. You will be taken to the next sign-up page. 5. Create a [employer] ID. This will be your [employer] login ID and cannot be changed, so think of one that is secure and easy to remember. 6. Create a [employer] password. You can change your password at any time. 7. Enter your Password Reset Question and Answer. This can be used at a later time if you forget your password. 8. Enter your e-mail address. You will receive e-mail notification when new information is available in [address] 9. Click Sign Up. You can now view and download portions of your medical record. 10. Click the Download Summary menu link to download a portable copy of your medical information. If you have questions, please visit the Frequently Asked Questions section of the [employer] website. Remember, [employer] is NOT to be used for urgent needs. For medical emergencies, dial 911. Now available from your iPhone and Android! Providers Seen During Your Hospitalization Provider Specialty Primary office phone Nakul Campa MD Emergency Medicine [phone] Anny Lomeli MD Internal Medicine [phone] Faith Bradford MD Internal Medicine [phone] Tk Mejia MD [facility] [phone] Your Primary Care Physician (PCP) Primary Care Physician Office Phone Office Fax Catrachita Martin [phone] You are allergic to the following Allergen Reactions Percocet (Oxycodone-Acetaminophen) Anxiety Anxiety, shaking Pcn (Penicillins) Rash Patient states this happened ~ 40 years ago Tolerates ceftriaxone Sulfa (Sulfonamide Antibiotics) Itching Vertigo Recent Documentation Height Weight BMI OB Status Smoking Status 1.524 m 43.1 kg 18.55 kg/m2 Hysterectomy Never Smoker Emergency Contacts Name Discharge Info Relation Home Work Mobile [facility] DISCHAR[facility] CAREGIVER [3] Other Relative [6] [phone] Patient Belongings The following personal items are in your possession at time of discharge: 
  Dental Appliances: None  Visual Aid: None      Home Medications: Kept at bedside   Jewelry: None  Clothing: None    Other Valuables: None Please provide this summary of care documentation to your next provider. Signatures-by signing, you are acknowledging that this After Visit Summary has been reviewed with you and you have received a copy. Patient Signature:  ____________________________________________________________ Date:  ____________________________________________________________  
  
Marlyse Leaks Provider Signature:  ____________________________________________________________ Date:  ____________________________________________________________

## 2018-01-08 NOTE — ED PROVIDER NOTES
HPI Comments: 80 y.o. female with past medical history significant for HTN, hypothyroidism, heart murmur, arthritis, depression, asthma, vertigo, thromboembolus, PE and autoimmune hemolytic anemia who presents from granddaughter's home via EMS with chief complaint of vomting. Per granddaughter, the pt lives with her and is non-verbal. She states that the pt has appeared with a lack of appetite over the past three day due to nausea. Per relative the pt experienced one episode of vomiting last night, which appeared dark in coloration. She notes that she is unsure if the emesis had blood present or not. In addition to her N/V, the relative makes it known that the pt has been constipated for over a week. Per relative, the pt was last seen for her constipation four weeks ago. The pt's daughter reports that the pt has appeared with increase weakness over the past few days, which has caused her to be unable to stand. The relative adds, that the pt does not ambulate however is usually able to support her own weight when standing. Per granddaughter, the pt was started on Nitrofurantoin for a UTI on January 6th. Once starting the medication, the relative reports that the pt began to appear with an increase in weakness and is unsure if the medication is related. The pt has not taken the Nitrofurantoin today per relative. Per daughter, the pt has hx of kidney failure, yet is not currently receiving dialysis. She makes it known that the pt is not currently on blood thinning medication. Today, the pt has expressed diffuse pain with touch. There are no other acute medical concerns at this time. Full history, physical exam, and ROS unable to be obtained due to:  Non-verbal.      Social hx: Non-smoker, No ETOH use       PCP: Marian Shen MD    Note written by Jannette Zuleta, as dictated by Chandler Reed MD 2:32 PM          The history is provided by a relative. No  was used.         Past Medical History:   Diagnosis Date    Arthritis     Asthma     Autoimmune hemolytic anemia (HCC)     Depression     Heart murmur     Hypertension     Hypothyroidism     Pulmonary embolism (HCC)     Thromboembolus (HCC)     Vertigo        Past Surgical History:   Procedure Laterality Date    HX COLONOSCOPY      HX HEENT      HX HYSTERECTOMY      HX ORTHOPAEDIC  R total hip    HX REFRACTIVE SURGERY           No family history on file. Social History     Social History    Marital status:      Spouse name: N/A    Number of children: N/A    Years of education: N/A     Occupational History    Not on file.      Social History Main Topics    Smoking status: Never Smoker    Smokeless tobacco: Never Used    Alcohol use No    Drug use: No    Sexual activity: Not on file     Other Topics Concern    Not on file     Social History Narrative         ALLERGIES: Percocet [oxycodone-acetaminophen]; Pcn [penicillins]; and Sulfa (sulfonamide antibiotics)    Review of Systems   Unable to perform ROS: Patient nonverbal       Vitals:    01/08/18 1404   BP: 143/75   Pulse: 79   Resp: 14   Temp: 98.5 °F (36.9 °C)   SpO2: 98%   Weight: 43.1 kg (95 lb)   Height: 5' (1.524 m)            Physical Exam   Physical Examination: General appearance - alert, elderly, chronically ill appearing, thin  Eyes - pupils equal and reactive, extraocular eye movements intact  Neck - supple, no significant adenopathy  Chest - clear to auscultation, no wheezes, rales or rhonchi, symmetric air entry  Heart - normal rate, regular rhythm, normal S1, S2, no murmurs, rubs, clicks or gallops  Abdomen - soft, fullness in lower abdomen, mild diffuse tenderness to palpation, no rebound/guarding/peritoneal signs  Back exam - full range of motion, no tenderness, palpable spasm or pain on motion  Neurological - alert, nonverbal, moving all extremities  Musculoskeletal - no joint tenderness, deformity or swelling  Extremities - peripheral pulses normal, no pedal edema, no clubbing or cyanosis  Skin - normal coloration and turgor, no rashes, skin breakdown to right buttock  MDM  Number of Diagnoses or Management Options  Adverse drug effect, initial encounter:   Dehydration:   Nausea and vomiting, intractability of vomiting not specified, unspecified vomiting type: Slow transit constipation:      Amount and/or Complexity of Data Reviewed  Clinical lab tests: ordered and reviewed  Tests in the radiology section of CPT®: ordered and reviewed  Decide to obtain previous medical records or to obtain history from someone other than the patient: yes  Obtain history from someone other than the patient: yes (family)  Review and summarize past medical records: yes  Discuss the patient with other providers: yes (hospitalist)  Independent visualization of images, tracings, or specimens: yes    Patient Progress  Patient progress: stable    ED Course       Procedures  EKG interpretation: (Preliminary)  Rhythm: normal sinus rhythm; and regular . Rate (approx.): 78; Axis: left axis deviation; IA interval: normal; QRS interval: normal ; ST/T wave: non-specific changes; t wave inversion I, aVL, T4-X1-ppjgdra from previous EKG    CONSULT NOTE:  4:18 PM Driss Wheatley MD spoke with Dr. Saleem Castle, Consult for Hospitalist.  Discussed available diagnostic tests and clinical findings. He is in agreement with care plans as outlined. Dr. Saleem Castle will see and admit the pt. Pt and family updated on test results and plan for admission.

## 2018-01-08 NOTE — H&P
Admission History and Physical      NAME:  Maya Schaefer   :   1932   MRN:  [id number]     PCP:  Kam Wheat MD     Date/Time:  2018           Assessment/Plan:       Nausea and vomiting (2018) / Chronic constipation:  Has had n/v overnight. Patient found by granddaughter this AM with dried emesis. Has not been eating. Usually has 1 bm every 2 weeks. Could have adverse effect from nitrofurantoin. -- antiemetics prn   -- IVF   -- await CT done in ED   -- add bowel regimen with senna and miralax    Failure to thrive in adult (2018): Has had slow decline, most in the past 2-3 weeks. Decreased po intake. Has been less participatory with ADLs than usual.  Suspect this may be from progression of dementia. -- palliative care consult   -- appropriate for hospice care as well if not improved with conservative measures   -- would avoid trazodone and ativan    UTI (urinary tract infection) (2017): Had UTI treated last month with nitrofurantoin and was diagnosed last week with UTI as well. Initially on cipro but changed to nitrofurantoin this weekend. -- obtain cultures from PCP   -- ceftriaxone iv alone for now    CKD (chronic kidney disease) stage 4, GFR 15-29 ml/min ([facility]) (2018):  Creatinine seems stable from prior. Would avoid nitrofurantoin in this elderly patient with CKD 4. Does appear clinically dehydrated. -- monitor creatinine   -- IVF   -- avoid NSAIDs    Hypothyroidism (10/19/2014): On low dose LT4.   -- check TSH   -- continue LT4    Dementia:     -- avoid sedating medications   -- orient frequently    -- will continue wellbutrin / effexor    Abnormal EKG:  Patient with diffuse TWI that is new compared to EKG from 2017. Unable to determine symptoms of ischemia. Troponin negative in ED. However, given poor overall prognosis, not likely to benefit from invasive cardiac testing. Could be from nitrofurantoin.    -- trend troponin   -- add low dose ASA for now    DNR confirmed with POA (granddaughter, Lavinia Her). Signed out to Dr. Lars Olvera. Subjective:     CHIEF COMPLAINT:  Increased lethargy    HISTORY OF PRESENT ILLNESS:     Ms. Fermin Vincent is a 80 y.o.  female who is admitted with Nausea and vomiting and failure to thrive  Ms. Bradley presented to the Emergency Department today with her grand daughter and daughter in law. Hx obtained from family due to advanced dementia. Has has been eating less and more lethargic for the past couple of weeks. Family initially noted that patient was less able to help with ADLs. Usually patient would help to transfer herself and dress herself, but now relying fully on family. Was seen by PCP and diagnosed with UTI last wee as family noted urine to be very malodorous and patient did c/o pain with urination and grabbing self when urinating. Initially on cipro, but changed to nitrofurantoin. Family notes patient has been worse since starting nitrofurantoin. Found this am with dried emesis on clothing. Past Medical History:   Diagnosis Date    Arthritis     Asthma     Autoimmune hemolytic anemia (HCC)     Depression     Heart murmur     Hypertension     Hypothyroidism     Pulmonary embolism (HCC)     Thromboembolus (HCC)     Vertigo         Past Surgical History:   Procedure Laterality Date    HX COLONOSCOPY      HX HEENT      HX HYSTERECTOMY      HX ORTHOPAEDIC  R total hip    HX REFRACTIVE SURGERY         Social History   Substance Use Topics    Smoking status: Never Smoker    Smokeless tobacco: Never Used    Alcohol use No        Family History   Problem Relation Age of Onset    Family history unknown:  Yes        Allergies   Allergen Reactions    Percocet [Oxycodone-Acetaminophen] Anxiety     Anxiety, shaking     Pcn [Penicillins] Rash     Patient states this happened ~ 40 years ago  Tolerates ceftriaxone    Sulfa (Sulfonamide Antibiotics) Itching and Vertigo        Prior to Admission medications    Medication Sig Start Date End Date Taking? Authorizing Provider   nitrofurantoin, macrocrystal-monohydrate, (MACROBID) 100 mg capsule Take 100 mg by mouth two (2) times a day. 1/6/18 1/12/18 Yes Historical Provider   LORazepam (ATIVAN) 0.5 mg tablet Take 0.5 mg by mouth daily. Around 3 pm   Yes Historical Provider   acetaminophen-codeine (TYLENOL-CODEINE #3) 300-30 mg per tablet Take 1 Tab by mouth every six (6) hours as needed (Severe pain). Yes Historical Provider   acetaminophen (TYLENOL EXTRA STRENGTH) 500 mg tablet Take 1,000 mg by mouth every six (6) hours as needed (Mild-Moderate pain). Yes Historical Provider   traZODone (DESYREL) 50 mg tablet Take 0.5 Tabs by mouth nightly. 7/11/17  Yes Tk Mejia MD   buPROPion XL (WELLBUTRIN XL) 300 mg XL tablet Take 300 mg by mouth every morning. Yes Historical Provider   venlafaxine-SR [facility] 75 mg capsule Take 75 mg by mouth daily. In addition to 150 mg daily. Total 225 mg daily. Yes Historical Provider   venlafaxine-SR (EFFEXOR XR) 150 mg capsule Take 150 mg by mouth daily. In addition to 75 mg daily. Total 225 mg daily. Yes Melo Raya MD   levothyroxine (LEVOTHROID) 50 mcg tablet Take 50 mcg by mouth Daily (before breakfast). Yes Historical Provider         Review of Systems:  Unable to obtain due to dementia.          Objective:      VITALS:    Vital signs reviewed; most recent are:    Visit Vitals    /78    Pulse 79    Temp 98.5 °F (36.9 °C)    Resp 14    Ht 5' (1.524 m)    Wt 43.1 kg (95 lb)    SpO2 95%    BMI 18.55 kg/m2     SpO2 Readings from Last 6 Encounters:   01/08/18 95%   12/14/17 97%   07/24/17 99%   07/11/17 95%   09/16/16 94%   04/04/16 98%        No intake or output data in the 24 hours ending 01/08/18 1640         Exam:     Physical Exam:    Gen:  thin, in no acute distress  HEENT:  Pink conjunctivae, PERRL, hearing intact to voice, dry mucous membranes  Neck:  Supple  Resp:  No accessory muscle use, clear breath sounds without wheezes rales or rhonchi  Card:  No murmurs, normal S1, S2 without thrills or peripheral edema  Abd:  Soft, non-tender, non-distended, normoactive bowel sounds are present  Musc:  No cyanosis  Skin:  No rashes, skin turgor is poor  Neuro: Face symmetric, tongue midline, eyes close equally,  strength is 5/5 bilaterally, does not follow commands appropriately  Psych:  Alert with poor insight. Labs:    Recent Labs      01/08/18   1430   WBC  10.8   HGB  12.7   HCT  40.1   PLT  198     Recent Labs      01/08/18   1430   NA  139   K  3.3*   CL  102   CO2  25   GLU  86   BUN  31*   CREA  2.64*   CA  9.2   MG  1.7   ALB  3.2*   TBILI  0.4   SGOT  11*   ALT  8*     Lab Results   Component Value Date/Time    Glucose (POC) 82 07/17/2017 08:15 PM    Glucose (POC) 67 07/17/2017 07:58 PM     No results for input(s): PH, PCO2, PO2, HCO3, FIO2 in the last 72 hours. No results for input(s): INR in the last 72 hours. No lab exists for component: INREXT    Chest Xray:  No acute process. EKG reviewed:   Sinus with diffuse TWI. Medical records reviewed in preparation for this admission: Old medical records. Previous electrocardiograms.     Surrogate decision maker:  Grand-daughter    Total time spent with patient: 79 [phone] [place] discussed with: Family and >50% of time spent in counseling and coordination of care    Discussed:  Code Status and Care Plan    Prophylaxis:  Hep SQ    Probable Disposition:  SNF/LTC           ___________________________________________________    Attending Physician: Ivy Mcgrath MD

## 2018-01-08 NOTE — ED NOTES
Patient eating has decreases and had 1 episode of red/brown vomit last night. No bowel movement in 1 week.

## 2018-01-08 NOTE — PROGRESS NOTES
[facility]: MED RECONCILIATION    Information obtained from: Patient's grand daughter and medication bottles     Allergies: Percocet [oxycodone-acetaminophen]; Pcn [penicillins]; and Sulfa (sulfonamide antibiotics)    Comments:  Lorazepam: Family gives this to her around 3 pm everyday. Macrobid: Started on 1/6/18 for 7 days, last dose last evening. Prior to Admission Medications:     Medication Documentation Review Audit       Reviewed by Frankie Cao PHARMD (Pharmacist) on 01/08/18 at 1518         Medication Sig Documenting Provider Last Dose Status Taking?      acetaminophen (TYLENOL EXTRA STRENGTH) 500 mg tablet Take 1,000 mg by mouth every six (6) hours as needed (Mild-Moderate pain). Historical Provider  Active Yes    acetaminophen-codeine (TYLENOL-CODEINE #3) 300-30 mg per tablet Take 1 Tab by mouth every six (6) hours as needed (Severe pain). Historical Provider  Active Yes    buPROPion XL (WELLBUTRIN XL) 300 mg XL tablet Take 300 mg by mouth every morning. Historical Provider 1/8/2018 AM Active Yes    levothyroxine (LEVOTHROID) 50 mcg tablet Take 50 mcg by mouth Daily (before breakfast). Historical Provider 1/8/2018 AM Active Yes    LORazepam (ATIVAN) 0.5 mg tablet Take 0.5 mg by mouth daily. Around 3 pm Historical Provider 1/7/2018 Active Yes    nitrofurantoin, macrocrystal-monohydrate, (MACROBID) 100 mg capsule Take 100 mg by mouth two (2) times a day. Historical Provider 1/7/2018 PM Active Yes    traZODone (DESYREL) 50 mg tablet Take 0.5 Tabs by mouth nightly. Olga Miller MD 1/7/2018 Active Yes    venlafaxine-SR (EFFEXOR XR) 150 mg capsule Take 150 mg by mouth daily. In addition to 75 mg daily. Total 225 mg daily. Melo Raya MD 1/8/2018 AM Active Yes    venlafaxine-SR (EFFEXOR-XR) 75 mg capsule Take 75 mg by mouth daily. In addition to 150 mg daily. Total 225 mg daily.  Historical Provider 1/8/2018 AM Active Yes                  Frankie Cao PHARMD   Contact: [phone]

## 2018-01-08 NOTE — ED TRIAGE NOTES
N/v x 3 days; stage 4 kidney failure per EMS; family reports recent mini strokes. Patient does not speak per family.

## 2018-01-08 NOTE — IP AVS SNAPSHOT
[address] 
[phone] Patient: Zayra Antony MRN: [id number] A check rolf indicates which time of day the medication should be taken. My Medications START taking these medications Instructions Each Dose to Equal  
 Morning Noon Evening Bedtime  
 aspirin 81 mg chewable tablet Start taking on:  1/13/2018 Your last dose was: Your next dose is: Take 1 Tab by mouth daily. 81 mg  
    
   
   
   
  
 potassium chloride SR 10 mEq tablet Commonly known as:  KLOR-CON 10 Your last dose was: Your next dose is: Take 1 Tab by mouth two (2) times a day. 10 mEq CONTINUE taking these medications Instructions Each Dose to Equal  
 Morning Noon Evening Bedtime * EFFEXOR  mg capsule Generic drug:  venlafaxine-SR Your last dose was: Your next dose is: Take 150 mg by mouth daily. In addition to 75 mg daily. Total 225 mg daily. 150 mg  
    
   
   
   
  
 * venlafaxine-SR 75 mg capsule Commonly known as:  EFFEXOR-XR Your last dose was: Your next dose is: Take 75 mg by mouth daily. In addition to 150 mg daily. Total 225 mg daily. 75 mg LEVOTHROID 50 mcg tablet Generic drug:  levothyroxine Your last dose was: Your next dose is: Take 50 mcg by mouth Daily (before breakfast). 50 mcg  
    
   
   
   
  
 traZODone 50 mg tablet Commonly known as:  [place] Your last dose was: Your next dose is: Take 0.5 Tabs by mouth nightly. 25 mg  
    
   
   
   
  
 TYLENOL EXTRA STRENGTH 500 mg tablet Generic drug:  acetaminophen Your last dose was: Your next dose is:     
   
   
 Take 1,000 mg by mouth every six (6) hours as needed (Mild-Moderate pain). 1000 mg WELLBUTRIN  mg XL tablet Generic drug:  buPROPion XL Your last dose was: Your next dose is: Take 300 mg by mouth every morning. 300 mg * Notice: This list has 2 medication(s) that are the same as other medications prescribed for you. Read the directions carefully, and ask your doctor or other care provider to review them with you. STOP taking these medications LORazepam 0.5 mg tablet Commonly known as:  ATIVAN  
   
  
 MACROBID 100 mg capsule Generic drug:  nitrofurantoin (macrocrystal-monohydrate) TYLENOL-CODEINE #3 300-30 mg per tablet Generic drug:  acetaminophen-codeine Where to Get Your Medications Information on where to get these meds will be given to you by the nurse or doctor. ! Ask your nurse or doctor about these medications  
  aspirin 81 mg chewable tablet  
 potassium chloride SR 10 mEq tablet  
 traZODone 50 mg tablet

## 2018-01-09 LAB
ANION GAP SERPL CALC-SCNC: 13 MMOL/L (ref 5–15)
ATRIAL RATE: 78 BPM
BUN SERPL-MCNC: 29 MG/DL (ref 6–20)
BUN/CREAT SERPL: 13 (ref 12–20)
CALCIUM SERPL-MCNC: 8.3 MG/DL (ref 8.5–10.1)
CALCULATED P AXIS, ECG09: 51 DEGREES
CALCULATED R AXIS, ECG10: -17 DEGREES
CALCULATED T AXIS, ECG11: -166 DEGREES
CHLORIDE SERPL-SCNC: 106 MMOL/L (ref 97–108)
CO2 SERPL-SCNC: 21 MMOL/L (ref 21–32)
CREAT SERPL-MCNC: 2.27 MG/DL (ref 0.55–1.02)
DIAGNOSIS, 93000: NORMAL
ERYTHROCYTE [DISTWIDTH] IN BLOOD BY AUTOMATED COUNT: 14.6 % (ref 11.5–14.5)
GLUCOSE SERPL-MCNC: 70 MG/DL (ref 65–100)
HCT VFR BLD AUTO: 33.8 % (ref 35–47)
HGB BLD-MCNC: 11.3 G/DL (ref 11.5–16)
MAGNESIUM SERPL-MCNC: 2.3 MG/DL (ref 1.6–2.4)
MCH RBC QN AUTO: 28.3 PG (ref 26–34)
MCHC RBC AUTO-ENTMCNC: 33.4 G/DL (ref 30–36.5)
MCV RBC AUTO: 84.5 FL (ref 80–99)
P-R INTERVAL, ECG05: 180 MS
PLATELET # BLD AUTO: 169 K/UL (ref 150–400)
POTASSIUM SERPL-SCNC: 3.2 MMOL/L (ref 3.5–5.1)
Q-T INTERVAL, ECG07: 378 MS
QRS DURATION, ECG06: 92 MS
QTC CALCULATION (BEZET), ECG08: 430 MS
RBC # BLD AUTO: 4 M/UL (ref 3.8–5.2)
SODIUM SERPL-SCNC: 140 MMOL/L (ref 136–145)
TROPONIN I SERPL-MCNC: <0.04 NG/ML
TSH SERPL DL<=0.05 MIU/L-ACNC: 0.69 UIU/ML (ref 0.36–3.74)
VENTRICULAR RATE, ECG03: 78 BPM
WBC # BLD AUTO: 7.7 K/UL (ref 3.6–11)

## 2018-01-09 PROCEDURE — L3710 EO ELAS W/METAL JNTS PRE OTS: HCPCS

## 2018-01-09 PROCEDURE — 80048 BASIC METABOLIC PNL TOTAL CA: CPT | Performed by: INTERNAL MEDICINE

## 2018-01-09 PROCEDURE — 65660000000 HC RM CCU STEPDOWN

## 2018-01-09 PROCEDURE — 77030011256 HC DRSG MEPILEX <16IN NO BORD MOLN -A

## 2018-01-09 PROCEDURE — 36415 COLL VENOUS BLD VENIPUNCTURE: CPT | Performed by: INTERNAL MEDICINE

## 2018-01-09 PROCEDURE — 84484 ASSAY OF TROPONIN QUANT: CPT | Performed by: INTERNAL MEDICINE

## 2018-01-09 PROCEDURE — 77030029211 HC GEL MEDIH TU INLC -B

## 2018-01-09 PROCEDURE — 83735 ASSAY OF MAGNESIUM: CPT | Performed by: INTERNAL MEDICINE

## 2018-01-09 PROCEDURE — 74011250636 HC RX REV CODE- 250/636: Performed by: INTERNAL MEDICINE

## 2018-01-09 PROCEDURE — 84443 ASSAY THYROID STIM HORMONE: CPT | Performed by: INTERNAL MEDICINE

## 2018-01-09 PROCEDURE — 85027 COMPLETE CBC AUTOMATED: CPT | Performed by: INTERNAL MEDICINE

## 2018-01-09 PROCEDURE — 76450000000

## 2018-01-09 PROCEDURE — 74011250637 HC RX REV CODE- 250/637: Performed by: INTERNAL MEDICINE

## 2018-01-09 PROCEDURE — 65270000029 HC RM PRIVATE

## 2018-01-09 PROCEDURE — 74011000258 HC RX REV CODE- 258: Performed by: INTERNAL MEDICINE

## 2018-01-09 RX ADMIN — HEPARIN SODIUM 5000 UNITS: 5000 INJECTION, SOLUTION INTRAVENOUS; SUBCUTANEOUS at 16:13

## 2018-01-09 RX ADMIN — SODIUM CHLORIDE 100 ML/HR: 900 INJECTION, SOLUTION INTRAVENOUS at 01:24

## 2018-01-09 RX ADMIN — DOCUSATE SODIUM AND SENNOSIDES 1 TABLET: 8.6; 5 TABLET, FILM COATED ORAL at 10:05

## 2018-01-09 RX ADMIN — HEPARIN SODIUM 5000 UNITS: 5000 INJECTION, SOLUTION INTRAVENOUS; SUBCUTANEOUS at 01:24

## 2018-01-09 RX ADMIN — VENLAFAXINE HYDROCHLORIDE 225 MG: 150 CAPSULE, EXTENDED RELEASE ORAL at 10:04

## 2018-01-09 RX ADMIN — POLYETHYLENE GLYCOL 3350 17 G: 17 POWDER, FOR SOLUTION ORAL at 18:30

## 2018-01-09 RX ADMIN — BUPROPION HYDROCHLORIDE 300 MG: 150 TABLET, FILM COATED, EXTENDED RELEASE ORAL at 10:04

## 2018-01-09 RX ADMIN — ASPIRIN 81 MG 81 MG: 81 TABLET ORAL at 10:05

## 2018-01-09 RX ADMIN — POLYETHYLENE GLYCOL 3350 17 G: 17 POWDER, FOR SOLUTION ORAL at 10:04

## 2018-01-09 RX ADMIN — LEVOTHYROXINE SODIUM 50 MCG: 50 TABLET ORAL at 06:10

## 2018-01-09 RX ADMIN — Medication 10 ML: at 16:14

## 2018-01-09 RX ADMIN — DOCUSATE SODIUM AND SENNOSIDES 1 TABLET: 8.6; 5 TABLET, FILM COATED ORAL at 18:30

## 2018-01-09 RX ADMIN — HEPARIN SODIUM 5000 UNITS: 5000 INJECTION, SOLUTION INTRAVENOUS; SUBCUTANEOUS at 10:05

## 2018-01-09 RX ADMIN — CEFTRIAXONE SODIUM 1 G: 1 INJECTION, POWDER, FOR SOLUTION INTRAMUSCULAR; INTRAVENOUS at 16:13

## 2018-01-09 NOTE — PROGRESS NOTES
Daily Progress Note: 1/9/2018  Maria Fernanda Pearson Self-lan  Eduardo Sheikh MD    Assessment/Plan:   Nausea and vomiting (1/8/2018) / Chronic constipation:  improved                        -- antiemetics prn                        -- IVF                        -- CT done in ED basically ok - see under Data Review                        -- add bowel regimen with senna and miralax     Failure to thrive in adult (1/8/2018): Has had slow decline, most in the past 2-3 weeks. Decreased po intake. Has been less participatory with ADLs than usual.  Suspect this may be from progression of dementia. -- palliative care consult                        -- appropriate for hospice care as well if not improved with conservative measures                        -- would avoid trazodone and ativan     UTI (urinary tract infection) (7/2/2017): Had UTI treated last month with nitrofurantoin and was diagnosed last week with UTI as well. Initially on cipro but changed to nitrofurantoin this weekend. -- ceftriaxone iv alone for now     CKD (chronic kidney disease) stage 4, GFR 15-29 ml/min ([facility]) (1/8/2018):  Creatinine seems stable from prior                        -- monitor creatinine                        -- IVF                        -- avoid NSAIDs     Hypothyroidism (10/19/2014): On low dose LT4.                        -- check TSH                        -- continue LT4     Dementia:                          -- avoid sedating medications                        -- orient frequently                                         -- will continue wellbutrin / effexor     Abnormal EKG:  Patient with diffuse TWI that is new compared to EKG from 7/2017. Unable to determine symptoms of ischemia. Troponin negative in ED. However, given poor overall prognosis, not likely to benefit from invasive cardiac testing. Could be from nitrofurantoin.                         -- trend troponin -- added low dose ASA for now     DNR confirmed with POA (granddaughter, Jass Andersen).        Problem List:  Problem List as of 1/9/2018  Date Reviewed: 1/8/2018          Codes Class Noted - Resolved    * (Principal)Nausea and vomiting ICD-10-CM: R11.2  ICD-9-CM: 787.01  1/8/2018 - Present        Failure to thrive in adult ICD-10-CM: R62.7  ICD-9-CM: 783.7  1/8/2018 - Present        CKD (chronic kidney disease) stage 4, GFR 15-29 ml/min ([facility] ICD-10-CM: N18.4  ICD-9-CM: 585.4  1/8/2018 - Present        Dementia ICD-10-CM: F03.90  ICD-9-CM: 294.20  1/8/2018 - Present        Hyperkalemia ICD-10-CM: E87.5  ICD-9-CM: 276.7  7/17/2017 - Present        Altered mental status ICD-10-CM: R41.82  ICD-9-CM: 780.97  7/17/2017 - Present        UTI (urinary tract infection) ICD-10-CM: N39.0  ICD-9-CM: 599.0  7/2/2017 - Present        Acute respiratory failure ([facility] ICD-10-CM: J96.00  ICD-9-CM: 518.81  12/21/2014 - Present        Pulmonary embolism ([facility] ICD-10-CM: I26.99  ICD-9-CM: 415.19  12/11/2014 - Present        NIMCO (acute kidney injury) ([facility] ICD-10-CM: N17.9  ICD-9-CM: 584.9  12/11/2014 - Present        Renal lesion ICD-10-CM: N28.9  ICD-9-CM: 593.9  11/5/2014 - Present        Autoimmune hemolytic anemia ([facility] ICD-10-CM: D59.1  ICD-9-CM: 283.0  11/5/2014 - Present        Leukocytosis ICD-10-CM: D72.829  ICD-9-CM: 288.60  10/25/2014 - Present        HTN (hypertension) ICD-10-CM: I10  ICD-9-CM: 401.9  10/19/2014 - Present        Depression ICD-10-CM: F32.9  ICD-9-CM: 419  10/19/2014 - Present        Hypothyroidism ICD-10-CM: E03.9  ICD-9-CM: 244.9  10/19/2014 - Present        Diarrhea ICD-10-CM: R19.7  ICD-9-CM: 787.91  10/19/2014 - Present        Leukopenia ICD-10-CM: D72.819  ICD-9-CM: 288.50  10/19/2014 - Present        Anemia ICD-10-CM: D64.9  ICD-9-CM: 285.9  10/19/2014 - Present        Transaminitis ICD-10-CM: R74.0  ICD-9-CM: 790.4  10/19/2014 - Present        Hyperbilirubinemia ICD-10-CM: E80.6  ICD-9-CM: 782.4 10/19/2014 - Present        Weakness ICD-10-CM: R53.1  ICD-9-CM: 780.79  10/18/2014 - Present              Subjective:    Ms. Curry Padilla is a 80 y.o.  female who is admitted with Nausea and vomiting and failure to thrive  Ms. Bradley presented to the Emergency Department today with her grand daughter and daughter in law. Hx obtained from family due to advanced dementia. Has has been eating less and more lethargic for the past couple of weeks. Family initially noted that patient was less able to help with ADLs. Usually patient would help to transfer herself and dress herself, but now relying fully on family. Was seen by PCP and diagnosed with UTI last wee as family noted urine to be very malodorous and patient did c/o pain with urination and grabbing self when urinating. Initially on cipro, but changed to nitrofurantoin. Family notes patient has been worse since starting nitrofurantoin. Found this am with dried emesis on clothing. (Dr Servando Yoder)     : Not oriented at all. Looks in no distress. Nurses report no N/V overnight. K+ low at 3.2 - replacing. Review of Systems:   Review of systems not obtained due to patient factors. Objective:   Physical Exam:     Visit Vitals    /63 (BP 1 Location: Right arm, BP Patient Position: At rest)    Pulse 77    Temp 97.5 °F (36.4 °C)    Resp 18    Ht 5' (1.524 m)    Wt 43.1 kg (95 lb)    SpO2 99%    BMI 18.55 kg/m2      O2 Device: Room air    Temp (24hrs), Av °F (36.7 °C), Min:97.5 °F (36.4 °C), Max:98.5 °F (36.9 °C)             General:   no distress, appears stated age; thin   Head:  Normocephalic, without obvious abnormality, atraumatic. Eyes:  Conjunctivae/corneas clear. EOMs intact. Nose: Nares normal. Septum midline. Mucosa normal. No drainage or sinus tenderness. Throat: Lips, mucosa, and tongue moist..   Neck: Supple, symmetrical, trachea midline, no adenopathy, no carotid bruit and no JVD.    Back:    No CVA tenderness. Lungs:   Clear to auscultation bilaterally. Chest wall:  No tenderness or deformity. Heart:  Regular rate and rhythm, S1, S2 normal, no murmur, click, rub or gallop. Abdomen:   Soft, apparently non-tender. Bowel sounds normal. No masses,  No organomegaly. Extremities: no cyanosis or edema. No calf tenderness or cords. Skin:  turgor fairl. Chronic ulcer right hip looks unchangedor lesions   Neurologic:  Alert and oriented X 0. She will say a few words. No cogwheeling or rigidity. Gait not tested at this time. Sensation grossly normal to touch. Gross motor of extremities normal.       Data Review:    AB CT 1/8/18: INDICATION: Vomiting, abdominal pain. Nausea.   Exam: Noncontrast CT of the abdomen and pelvis is performed with 5 mm  collimation. Sagittal and coronal reformatted images were also performed.   FINDINGS:  There is mild bibasilar atelectasis. Abdomen:   Liver: The liver is normal on noncontrast images. Spleen: The spleen is normal on noncontrast images. Adrenals: The adrenals are normal on noncontrast images. Pancreas: Pancreas is atrophic in appearance. Gallbladder: The gallbladder is normal on noncontrast images. Kidneys: There is no perinephric stranding, hydronephrosis or hydroureter. No  renal, ureteral bladder calculus is visualized. Bowel: No thickened or dilated loop of large or small bowel seen. Abdominal aorta: Extensive atherosclerotic calcifications are noted within the  abdominal aorta. There is 2.7 cm fusiform ectasia of the distal abdominal aorta,  measuring 2.4 cm on prior CT dated December 2014. Pelvis: Urinary bladder is partially filled and grossly normal.  Bones: There is an age indeterminate moderate L3 compression deformity, new  compared to prior CT dated December 2014. Osseous structures are diffusely  demineralized.    Miscellaneous: Within the left hemipelvis, there is a 1.6 cm x 4.9 cm  thin-walled collection representing residual seroma related to prior hemorrhage  seen on prior CT dated December 2014. There is no free intraperitoneal gas or  fluid. There is a beam hardening artifact within the pelvis secondary to right  hip prosthesis. IMPRESSION:   1. No bowel obstruction, ileus or perforation. 2. Age indeterminant moderate L3 compression deformity. Recent Days:  Recent Labs      01/09/18   0129  01/08/18   1430   WBC  7.7  10.8   HGB  11.3*  12.7   HCT  33.8*  40.1   PLT  169  198     Recent Labs      01/09/18   0129  01/08/18   1430   NA  140  139   K  3.2*  3.3*   CL  106  102   CO2  21  25   GLU  70  86   BUN  29*  31*   CREA  2.27*  2.64*   CA  8.3*  9.2   MG  2.3  1.7   ALB   --   3.2*   TBILI   --   0.4   SGOT   --   11*   ALT   --   8*     No results for input(s): PH, PCO2, PO2, HCO3, FIO2 in the last 72 hours.     24 Hour Results:  Recent Results (from the past 24 hour(s))   EKG, 12 LEAD, INITIAL    Collection Time: 01/08/18  2:13 PM   Result Value Ref Range    Ventricular Rate 78 BPM    Atrial Rate 78 BPM    P-R Interval 180 ms    QRS Duration 92 ms    Q-T Interval 378 ms    QTC Calculation (Bezet) 430 ms    Calculated P Axis 51 degrees    Calculated R Axis -17 degrees    Calculated T Axis -166 degrees    Diagnosis       Normal sinus rhythm  ST & T wave abnormality, consider inferior ischemia  ST & T wave abnormality, consider anterolateral ischemia  Abnormal ECG  When compared with ECG of 17-JUL-2017 20:48,  ST now depressed in Anterior leads  T wave inversion now evident in Inferior leads  T wave inversion now evident in Anterolateral leads     CBC WITH AUTOMATED DIFF    Collection Time: 01/08/18  2:30 PM   Result Value Ref Range    WBC 10.8 3.6 - 11.0 K/uL    RBC 4.58 3.80 - 5.20 M/uL    HGB 12.7 11.5 - 16.0 g/dL    HCT 40.1 35.0 - 47.0 %    MCV 87.6 80.0 - 99.0 FL    MCH 27.7 26.0 - 34.0 PG    MCHC 31.7 30.0 - 36.5 g/dL    RDW 14.8 (H) 11.5 - 14.5 %    PLATELET 988 680 - 423 K/uL    NEUTROPHILS 79 (H) 32 - 75 % LYMPHOCYTES 3 (L) 12 - 49 %    MONOCYTES 8 5 - 13 %    EOSINOPHILS 10 (H) 0 - 7 %    BASOPHILS 0 0 - 1 %    ABS. NEUTROPHILS 8.5 (H) 1.8 - 8.0 K/UL    ABS. LYMPHOCYTES 0.3 (L) 0.8 - 3.5 K/UL    ABS. MONOCYTES 0.9 0.0 - 1.0 K/UL    ABS. EOSINOPHILS 1.1 (H) 0.0 - 0.4 K/UL    ABS. BASOPHILS 0.0 0.0 - 0.1 K/UL    DF SMEAR SCANNED      RBC COMMENTS NORMOCYTIC, NORMOCHROMIC      RBC COMMENTS POIKILOCYTOSIS  1+        RBC COMMENTS OVALOCYTES  1+       METABOLIC PANEL, COMPREHENSIVE    Collection Time: 01/08/18  2:30 PM   Result Value Ref Range    Sodium 139 136 - 145 mmol/L    Potassium 3.3 (L) 3.5 - 5.1 mmol/L    Chloride 102 97 - 108 mmol/L    CO2 25 21 - 32 mmol/L    Anion gap 12 5 - 15 mmol/L    Glucose 86 65 - 100 mg/dL    BUN 31 (H) 6 - 20 MG/DL    Creatinine 2.64 (H) 0.55 - 1.02 MG/DL    BUN/Creatinine ratio 12 12 - 20      GFR est AA 21 (L) >60 ml/min/1.73m2    GFR est non-AA 17 (L) >60 ml/min/1.73m2    Calcium 9.2 8.5 - 10.1 MG/DL    Bilirubin, total 0.4 0.2 - 1.0 MG/DL    ALT (SGPT) 8 (L) 12 - 78 U/L    AST (SGOT) 11 (L) 15 - 37 U/L    Alk.  phosphatase 78 45 - 117 U/L    Protein, total 6.2 (L) 6.4 - 8.2 g/dL    Albumin 3.2 (L) 3.5 - 5.0 g/dL    Globulin 3.0 2.0 - 4.0 g/dL    A-G Ratio 1.1 1.1 - 2.2     MAGNESIUM    Collection Time: 01/08/18  2:30 PM   Result Value Ref Range    Magnesium 1.7 1.6 - 2.4 mg/dL   LIPASE    Collection Time: 01/08/18  2:30 PM   Result Value Ref Range    Lipase 42 (L) 73 - 393 U/L   URINALYSIS W/ REFLEX CULTURE    Collection Time: 01/08/18  2:30 PM   Result Value Ref Range    Color DARK YELLOW      Appearance CLEAR CLEAR      Specific gravity 1.016 1.003 - 1.030      pH (UA) 6.0 5.0 - 8.0      Protein 30 (A) NEG mg/dL    Glucose NEGATIVE  NEG mg/dL    Ketone 40 (A) NEG mg/dL    Bilirubin NEGATIVE  NEG      Blood TRACE (A) NEG      Urobilinogen 0.2 0.2 - 1.0 EU/dL    Nitrites POSITIVE (A) NEG      Leukocyte Esterase NEGATIVE  NEG      WBC 0-4 0 - 4 /hpf    RBC 0-5 0 - 5 /hpf    Epithelial cells FEW FEW /lpf    Bacteria NEGATIVE  NEG /hpf    UA:UC IF INDICATED CULTURE NOT INDICATED BY UA RESULT CNI     TROPONIN I    Collection Time: 01/08/18  2:30 PM   Result Value Ref Range    Troponin-I, Qt. <0.04 <0.05 ng/mL   TROPONIN I    Collection Time: 01/08/18  6:37 PM   Result Value Ref Range    Troponin-I, Qt. <0.04 <0.05 ng/mL   CBC W/O DIFF    Collection Time: 01/09/18  1:29 AM   Result Value Ref Range    WBC 7.7 3.6 - 11.0 K/uL    RBC 4.00 3.80 - 5.20 M/uL    HGB 11.3 (L) 11.5 - 16.0 g/dL    HCT 33.8 (L) 35.0 - 47.0 %    MCV 84.5 80.0 - 99.0 FL    MCH 28.3 26.0 - 34.0 PG    MCHC 33.4 30.0 - 36.5 g/dL    RDW 14.6 (H) 11.5 - 14.5 %    PLATELET 444 085 - 800 K/uL   MAGNESIUM    Collection Time: 01/09/18  1:29 AM   Result Value Ref Range    Magnesium 2.3 1.6 - 2.4 mg/dL   METABOLIC PANEL, BASIC    Collection Time: 01/09/18  1:29 AM   Result Value Ref Range    Sodium 140 136 - 145 mmol/L    Potassium 3.2 (L) 3.5 - 5.1 mmol/L    Chloride 106 97 - 108 mmol/L    CO2 21 21 - 32 mmol/L    Anion gap 13 5 - 15 mmol/L    Glucose 70 65 - 100 mg/dL    BUN 29 (H) 6 - 20 MG/DL    Creatinine 2.27 (H) 0.55 - 1.02 MG/DL    BUN/Creatinine ratio 13 12 - 20      GFR est AA 25 (L) >60 ml/min/1.73m2    GFR est non-AA 20 (L) >60 ml/min/1.73m2    Calcium 8.3 (L) 8.5 - 10.1 MG/DL   TSH 3RD GENERATION    Collection Time: 01/09/18  1:29 AM   Result Value Ref Range    TSH 0.69 0.36 - 3.74 uIU/mL   TROPONIN I    Collection Time: 01/09/18  1:29 AM   Result Value Ref Range    Troponin-I, Qt. <0.04 <0.05 ng/mL       Medications reviewed  Current Facility-Administered Medications   Medication Dose Route Frequency    sodium chloride (NS) flush 5-10 mL  5-10 mL IntraVENous Q8H    sodium chloride (NS) flush 5-10 mL  5-10 mL IntraVENous PRN    heparin (porcine) injection 5,000 Units  5,000 Units SubCUTAneous Q8H    buPROPion XL (WELLBUTRIN XL) tablet 300 mg  300 mg Oral DAILY    venlafaxine-SR (EFFEXOR-XR) capsule 225 mg  225 mg Oral DAILY    levothyroxine (SYNTHROID) tablet 50 mcg  50 mcg Oral ACB    cefTRIAXone (ROCEPHIN) 1 g in 0.9% sodium chloride (MBP/ADV) 50 mL  1 g IntraVENous Q24H    0.9% sodium chloride infusion  100 mL/hr IntraVENous CONTINUOUS    polyethylene glycol (MIRALAX) packet 17 g  17 g Oral BID    senna-docusate (PERICOLACE) 8.6-50 mg per tablet 1 Tab  1 Tab Oral BID    aspirin chewable tablet 81 mg  81 mg Oral DAILY       Care Plan discussed with: Patient/Family and Nurse    Total time spent with patient: 30 minutes.     Denver Ralphs, MD

## 2018-01-09 NOTE — PROGRESS NOTES
CM Note:  Spoke with pt's granddaughter, Tru Gomez, for d/c planning. PTA pt had gotten to the point that she needed everything done for her. She had caregivers during the day as her granddtr works. Pt had home health with [facility] in the past and had been at [facility] in the past.  Her emergency contact is her granddaughter, Tru Gomez ([phone]), who is also her MPOA. Pt's daughter, Tahira Schuler ([phone]) is her POA. Pt has Rx coverage and gets her medications from [facility] on [address]. Family's plan for pt is to admit her for LTC to [facility] on [address]. [facility] was notified pt needs Medicaid and to call pt's daughter, Tahira Schuler for information. PROMISE Holley    Care Management Interventions  PCP Verified by CM:  Yes (PCP is Dr. Alyssa Hess.)  Palliative Care Criteria Met (RRAT>21 & CHF Dx)?: Yes  Palliative Consult Recommended?: Yes  MyChart Signup: No  Discharge Durable Medical Equipment: No  Physical Therapy Consult: Yes  Occupational Therapy Consult: Yes  Speech Therapy Consult: No  Current Support Network: Relative's Home (Pt lives on the ground floor of a 2 story house with her granddaughter &  and 3 children.)  Confirm Follow Up Transport:  (to be determined)  Plan discussed with Pt/Family/Caregiver: Yes  Discharge Location  Discharge Placement: Skilled nursing facility

## 2018-01-09 NOTE — PROGRESS NOTES
Nutrition Assessment:    RECOMMENDATIONS/INTERVENTION(S):   Advance to Regular as able to encourage PO intakes. Monitor PO intakes, n/v, BM status. - noted pericolace and Miralax in MAR- add enema PRN? Add Ensure compact TID-  When diet advances. ASSESSMENT:   1/9/18: 80 yr old female admitted for AMS, n/v. Found with dried emesis. PMHx: HTN, hypothyroid, CKD4, Dementia. Pt currently on CLD- advance as able to Regular. Per chart, pt has lost significant wt in the last month- 18 lbs wt loss (16%) in < 1 month. Poor PO intakes PTA per reports. Pt meets malnutrition criteria. Pt unable to give diet history. No family during visit. Per chart, pt has chronic constipation, sometimes goes 2 weeks without a BM. Monitor BM status- add enema PRN if appropriate. Pt has P/I on hip (adendum- unstageable per [facility]). Labs: K+ 3.2, Cr 2.27, BUN 29. Meets Criteria for Severe Acute Malnutrition as evidenced by:   [x] Moderate muscle wasting, loss of subcutaneous fat   [x] Nutritional intake of <50% of recommended intake for >5 days   [x] Weight loss of >1-2% in 1 week, >5% in 1 month, >7.5% in 3 months, or >10% in 6 months   [] Moderate-severe edema     Noted subsequent nutrition consult and MST for p/u from wound care RN. SUBJECTIVE/OBJECTIVE:   Diet Order: Clear liquids  % Eaten:  No data found.     Pertinent Medications: [x] Reviewed    Past Medical History:   Diagnosis Date    Arthritis     Asthma     Autoimmune hemolytic anemia ([address]     Depression     Heart murmur     Hypertension     Hypothyroidism     Pulmonary embolism (HCC)     Thromboembolus (HCC)     Vertigo         Chemistries:  Lab Results   Component Value Date/Time    Sodium 140 01/09/2018 01:29 AM    Potassium 3.2 01/09/2018 01:29 AM    Chloride 106 01/09/2018 01:29 AM    CO2 21 01/09/2018 01:29 AM    Anion gap 13 01/09/2018 01:29 AM    Glucose 70 01/09/2018 01:29 AM    BUN 29 01/09/2018 01:29 AM    Creatinine 2.27 01/09/2018 01:29 AM BUN/Creatinine ratio 13 01/09/2018 01:29 AM    GFR est AA 25 01/09/2018 01:29 AM    GFR est non-AA 20 01/09/2018 01:29 AM    Calcium 8.3 01/09/2018 01:29 AM    AST (SGOT) 11 01/08/2018 02:30 PM    Alk. phosphatase 78 01/08/2018 02:30 PM    Protein, total 6.2 01/08/2018 02:30 PM    Albumin 3.2 01/08/2018 02:30 PM    Globulin 3.0 01/08/2018 02:30 PM    A-G Ratio 1.1 01/08/2018 02:30 PM    ALT (SGPT) 8 01/08/2018 02:30 PM      Anthropometrics: Height: 5' (152.4 cm) Weight: 43.1 kg (95 lb)    IBW (%IBW):   ( ) UBW (%UBW):   (  %)    BMI: Body mass index is 18.55 kg/(m^2). This BMI is indicative of:     [x] Underweight    [] Normal    [] Overweight    []  Obesity    []  Extreme Obesity (BMI>40)    Estimated Nutrition Needs (Based on): 4883 Kcals/day (BMR(798x1.3)+250) , 52 g (-60g/day(1.2-1.4g/kg)) Protein  Carbohydrate: At Least 130 g/day  Fluids: 1300 mL/day    Last BM: ? -chronic constipation   []Active     []Hyperactive  []Hypoactive       [] Absent   BS  Skin:    [] Intact   [] Incision  [x] Breakdown  - P/I hip - unstageable [] DTI   [] Tears/Excoriation/Abrasion  []Edema [] Other: Wt Readings from Last 30 Encounters:   01/08/18 43.1 kg (95 lb)   12/14/17 51.3 kg (113 lb)   07/17/17 70.3 kg (155 lb)   07/02/17 70.3 kg (155 lb)   09/16/16 72.6 kg (160 lb)   04/04/16 72.6 kg (160 lb)   04/08/15 77.2 kg (170 lb 3.2 oz)   02/09/15 75.3 kg (166 lb)   12/24/14 91.4 kg (201 lb 8 oz)   12/11/14 77.1 kg (170 lb)   12/03/14 76.9 kg (169 lb 9.6 oz)   11/24/14 77.1 kg (170 lb)   11/05/14 74 kg (163 lb 3.2 oz)   10/18/14 76.2 kg (168 lb)      NUTRITION DIAGNOSES:   Problem:  Unintended weight loss     Etiology: related to decreased ability to consume suffucient energy     Signs/Symptoms: as evidenced by wt loss, poor PO intakes, underweight, low BMI for age.        NUTRITION INTERVENTIONS:  Meals/Snacks: General/healthful diet   Supplements: Commercial supplement              GOAL:   Pt will consume >50% of meals and ONS within 2-4 days    Cultural, Adventist, or Ethnic Dietary Needs: None     LEARNING NEEDS (Diet, Food/Nutrient-Drug Interaction):    [x] None Identified   [] Identified and Education Provided/Documented   [] Identified and Pt declined/was not appropriate      [x] Interdisciplinary Care Plan Reviewed/Documented    [x] Discharge Needs:   TBD   [] No Nutrition Related Discharge Needs    NUTRITION RISK:   Pt Is At Nutrition Risk  [x]     No Nutrition Risk Identified  []       PT SEEN FOR:    []  MD Consult: []Calorie Count      []Diabetic Diet Education        []Diet Education     []Electrolyte Management     []General Nutrition Management and Supplements     []Management of Tube Feeding     []TPN Recommendations    [x]  RN Referral:  [x]MST score >=2     []Enteral/Parenteral Nutrition PTA     []Pregnant: Gestational DM or Multigestation                 [] Pressure Ulcer      []  Low BMI      []  Length of Stay       [] Dysphagia Diet     [] Ventilator      [] Follow-Up        Previous Recommendations:   [] Implemented          [] Not Implemented          [x] Not Applicable    Previous Goal:   [] Met              [] Progressing Towards Goal              [] Not Progressing Towards Goal   [x] Not Applicable              Dominick Olivas, [address] [place] [address]   Pager [phone]

## 2018-01-09 NOTE — WOUND CARE
Wound care consult:  Initial visit for skin and wound assessment, alert, confused and crying, reoriented, no pain. Assessment  All skin folds and bony prominences assessed, turned with staff assistance. Incontinent of urine, heels and elbows intact, skin is thin and fragile. Wt 90 lbs  Sacral area intact, right hip pressure injury, present on admission- 2x2 cm full thickness unstageable injury, yellow slough in base with pink edges, yellow drainage- no odor. Treatment  Incontinent care given with  Repositioned in bed   Heels floated- mepilex medihoney to right hip wound. Recommendations/Plan  Nutrition, mobility consults. Turn, reposition every 2 hours as tolerated, float heels, place in prevalon boots if needed. Incontinent care with comfort shields. Apply Zinc to all open areas, moisture barrier as needed. Wound care and assessment discussed with Dr Marisa Bender, orders obtained. Will follow, staff advised of care given    Amairani Lopez RN [facility], [facility].     Will follow, reconsult as needed.

## 2018-01-09 NOTE — PROGRESS NOTES
CM Note:  Pt unable to provide any history. Pt's granddaughter, Stephanie Aceves, was called ([phone]). A message was left for her to call me for d/c planning. JASS Roth

## 2018-01-09 NOTE — ROUTINE PROCESS
TRANSFER - OUT REPORT:    Verbal report given to Fritz Lal RN on [address]  being transferred to   for routine progression of care       Report consisted of patients Situation, Background, Assessment and   Recommendations(SBAR). Information from the following report(s) SBAR, ED Summary, [facility] and Recent Results was reviewed with the receiving nurse. Opportunity for questions and clarification was provided.

## 2018-01-09 NOTE — ROUTINE PROCESS
TRANSFER - IN REPORT:    [facility] [address]  being received from ED(unit) for routine progression of care      Report consisted of patients Situation, Background, Assessment and   Recommendations(SBAR). Information from the following report(s) SBAR and Kardex was reviewed with the receiving nurse. Opportunity for questions and clarification was provided. Assessment completed upon patients arrival to unit and care assumed. Primary Nurse German Gimenez and Aries Díaz RN performed a dual skin assessment on this patient Impairment noted- see wound doc flow sheet-- pt has wound to R hip. Foam dressing applied. Wound consult placed. Sacrum is red--pt turned to L side and will turn pt frequently.   Rudi score is 12

## 2018-01-10 PROCEDURE — 74011250636 HC RX REV CODE- 250/636: Performed by: INTERNAL MEDICINE

## 2018-01-10 PROCEDURE — 74011000258 HC RX REV CODE- 258: Performed by: INTERNAL MEDICINE

## 2018-01-10 PROCEDURE — 74011250637 HC RX REV CODE- 250/637: Performed by: INTERNAL MEDICINE

## 2018-01-10 PROCEDURE — 65660000000 HC RM CCU STEPDOWN

## 2018-01-10 PROCEDURE — 74011250637 HC RX REV CODE- 250/637: Performed by: FAMILY MEDICINE

## 2018-01-10 RX ORDER — TRAZODONE HYDROCHLORIDE 50 MG/1
25 TABLET ORAL
Status: DISCONTINUED | OUTPATIENT
Start: 2018-01-10 | End: 2018-01-12 | Stop reason: HOSPADM

## 2018-01-10 RX ADMIN — HEPARIN SODIUM 5000 UNITS: 5000 INJECTION, SOLUTION INTRAVENOUS; SUBCUTANEOUS at 11:05

## 2018-01-10 RX ADMIN — ASPIRIN 81 MG 81 MG: 81 TABLET ORAL at 11:05

## 2018-01-10 RX ADMIN — HEPARIN SODIUM 5000 UNITS: 5000 INJECTION, SOLUTION INTRAVENOUS; SUBCUTANEOUS at 16:21

## 2018-01-10 RX ADMIN — DOCUSATE SODIUM AND SENNOSIDES 1 TABLET: 8.6; 5 TABLET, FILM COATED ORAL at 11:05

## 2018-01-10 RX ADMIN — SODIUM CHLORIDE 100 ML/HR: 900 INJECTION, SOLUTION INTRAVENOUS at 06:08

## 2018-01-10 RX ADMIN — DOCUSATE SODIUM AND SENNOSIDES 1 TABLET: 8.6; 5 TABLET, FILM COATED ORAL at 18:02

## 2018-01-10 RX ADMIN — SODIUM CHLORIDE 100 ML/HR: 900 INJECTION, SOLUTION INTRAVENOUS at 16:21

## 2018-01-10 RX ADMIN — LEVOTHYROXINE SODIUM 50 MCG: 50 TABLET ORAL at 06:08

## 2018-01-10 RX ADMIN — POLYETHYLENE GLYCOL 3350 17 G: 17 POWDER, FOR SOLUTION ORAL at 18:02

## 2018-01-10 RX ADMIN — POLYETHYLENE GLYCOL 3350 17 G: 17 POWDER, FOR SOLUTION ORAL at 11:04

## 2018-01-10 RX ADMIN — BUPROPION HYDROCHLORIDE 300 MG: 150 TABLET, FILM COATED, EXTENDED RELEASE ORAL at 11:05

## 2018-01-10 RX ADMIN — CEFTRIAXONE SODIUM 1 G: 1 INJECTION, POWDER, FOR SOLUTION INTRAMUSCULAR; INTRAVENOUS at 16:21

## 2018-01-10 RX ADMIN — VENLAFAXINE HYDROCHLORIDE 225 MG: 150 CAPSULE, EXTENDED RELEASE ORAL at 11:05

## 2018-01-10 RX ADMIN — HEPARIN SODIUM 5000 UNITS: 5000 INJECTION, SOLUTION INTRAVENOUS; SUBCUTANEOUS at 01:49

## 2018-01-10 RX ADMIN — TRAZODONE HYDROCHLORIDE 25 MG: 50 TABLET ORAL at 22:23

## 2018-01-10 NOTE — PROGRESS NOTES
Bedside and Verbal shift change report given to sofi reagan  (oncoming nurse) by Lucille Weaver  (offgoing nurse). Report included the following information SBAR and Kardex.

## 2018-01-10 NOTE — PROGRESS NOTES
Problem: Falls - Risk of  Goal: *Absence of Falls  Document Luther Fall Risk and appropriate interventions in the flowsheet.    Outcome: Progressing Towards Goal  Fall Risk Interventions:  Mobility Interventions: Bed/chair exit alarm    Mentation Interventions: Bed/chair exit alarm    Medication Interventions: Patient to call before getting OOB    Elimination Interventions: Call light in reach

## 2018-01-10 NOTE — ROUTINE PROCESS
Bedside and Verbal shift change report given to Nova Roland (oncoming nurse) by Arin Hanson (offgoing nurse). Report included the following information SBAR, Kardex, Intake/Output, MAR, Accordion and Recent Results.

## 2018-01-10 NOTE — PROGRESS NOTES
Daily Progress Note: 1/10/2018  Maria Fernanda Piper-Kaylan  Kam Wheat MD    Assessment/Plan:   Nausea and vomiting (1/8/2018) / Chronic constipation:  improved                        -- antiemetics prn                        -- IVF                        -- CT done in ED basically ok - see under Data Review                        -- add bowel regimen with senna and miralax     Failure to thrive in adult (1/8/2018): Has had slow decline, most in the past 2-3 weeks. Decreased po intake. Has been less participatory with ADLs than usual.  Suspect this may be from progression of dementia. -- palliative care consult                        -- appropriate for hospice care as well if not improved with conservative measures                        -- would avoid trazodone and ativan     UTI (urinary tract infection) (7/2/2017): Had UTI treated last month with nitrofurantoin and was diagnosed last week with UTI as well. Initially on cipro but changed to nitrofurantoin this weekend. -- ceftriaxone iv alone for now     CKD (chronic kidney disease) stage 4, GFR 15-29 ml/min ([facility]) (1/8/2018):  Creatinine seems stable from prior                        -- monitor creatinine                        -- IVF                        -- avoid NSAIDs     Hypothyroidism (10/19/2014): On low dose LT4.                        -- check TSH                        -- continue LT4     Dementia:                          -- avoid sedating medications                        -- orient frequently                                         -- will continue wellbutrin / effexor     Abnormal EKG:  Patient with diffuse TWI that is new compared to EKG from 7/2017. Unable to determine symptoms of ischemia. Troponin negative in ED. However, given poor overall prognosis, not likely to benefit from invasive cardiac testing. Could be from nitrofurantoin.                         -- trend troponin reveals less than 0.04                        -- added low dose ASA for now     DNR confirmed with POA (granddaughter, Amarilis Allen).        Problem List:  Problem List as of 1/10/2018  Date Reviewed: 1/8/2018          Codes Class Noted - Resolved    * (Principal)Nausea and vomiting ICD-10-CM: R11.2  ICD-9-CM: 787.01  1/8/2018 - Present        Failure to thrive in adult ICD-10-CM: R62.7  ICD-9-CM: 783.7  1/8/2018 - Present        CKD (chronic kidney disease) stage 4, GFR 15-29 ml/min ([facility] ICD-10-CM: N18.4  ICD-9-CM: 585.4  1/8/2018 - Present        Dementia ICD-10-CM: F03.90  ICD-9-CM: 294.20  1/8/2018 - Present        Hyperkalemia ICD-10-CM: E87.5  ICD-9-CM: 276.7  7/17/2017 - Present        Altered mental status ICD-10-CM: R41.82  ICD-9-CM: 780.97  7/17/2017 - Present        UTI (urinary tract infection) ICD-10-CM: N39.0  ICD-9-CM: 599.0  7/2/2017 - Present        Acute respiratory failure ([facility] ICD-10-CM: J96.00  ICD-9-CM: 518.81  12/21/2014 - Present        Pulmonary embolism ([facility] ICD-10-CM: I26.99  ICD-9-CM: 415.19  12/11/2014 - Present        NIMCO (acute kidney injury) ([facility] ICD-10-CM: N17.9  ICD-9-CM: 584.9  12/11/2014 - Present        Renal lesion ICD-10-CM: N28.9  ICD-9-CM: 593.9  11/5/2014 - Present        Autoimmune hemolytic anemia ([facility] ICD-10-CM: D59.1  ICD-9-CM: 283.0  11/5/2014 - Present        Leukocytosis ICD-10-CM: D72.829  ICD-9-CM: 288.60  10/25/2014 - Present        HTN (hypertension) ICD-10-CM: I10  ICD-9-CM: 401.9  10/19/2014 - Present        Depression ICD-10-CM: F32.9  ICD-9-CM: 454  10/19/2014 - Present        Hypothyroidism ICD-10-CM: E03.9  ICD-9-CM: 244.9  10/19/2014 - Present        Diarrhea ICD-10-CM: R19.7  ICD-9-CM: 787.91  10/19/2014 - Present        Leukopenia ICD-10-CM: D72.819  ICD-9-CM: 288.50  10/19/2014 - Present        Anemia ICD-10-CM: D64.9  ICD-9-CM: 285.9  10/19/2014 - Present        Transaminitis ICD-10-CM: R74.0  ICD-9-CM: 790.4  10/19/2014 - Present        Hyperbilirubinemia ICD-10-CM: E80.6  ICD-9-CM: 782.4  10/19/2014 - Present        Weakness ICD-10-CM: R53.1  ICD-9-CM: 780.79  10/18/2014 - Present              Subjective:    Ms. Mike Hinton is a 80 y.o.  female who is admitted with Nausea and vomiting and failure to thrive  Ms. Bradley presented to the Emergency Department today with her grand daughter and daughter in law. Hx obtained from family due to advanced dementia. Has has been eating less and more lethargic for the past couple of weeks. Family initially noted that patient was less able to help with ADLs. Usually patient would help to transfer herself and dress herself, but now relying fully on family. Was seen by PCP and diagnosed with UTI last wee as family noted urine to be very malodorous and patient did c/o pain with urination and grabbing self when urinating. Initially on cipro, but changed to nitrofurantoin. Family notes patient has been worse since starting nitrofurantoin. Found this am with dried emesis on clothing. (Dr Shelly Marie)     : Not oriented at all. Looks in no distress. Nurses report no N/V overnight. K+ low at 3.2 - replacing. 1/10:  More alert today and able to speak in sentences - content often poor. She is able to say \"want to go home. \"  No further N/V. AM labs pending. Cont Rocephin for now. Review of Systems:   Review of systems not obtained due to patient factors. Objective:   Physical Exam:     Visit Vitals    /79 (BP 1 Location: Right arm, BP Patient Position: At rest)    Pulse 73    Temp 97.1 °F (36.2 °C)    Resp 17    Ht 5' (1.524 m)    Wt 43.1 kg (95 lb)    SpO2 100%    BMI 18.55 kg/m2      O2 Device: Room air    Temp (24hrs), Av.8 °F (36.6 °C), Min:97.1 °F (36.2 °C), Max:98.6 °F (37 °C)         1901 - 01/10 0700  In: 240 [P.O.:240]  Out: -     General:   no distress, appears stated age; thin   Head:  Normocephalic, without obvious abnormality, atraumatic.    Eyes:  Conjunctivae/corneas clear.  EOMs intact. Nose: Nares normal. Septum midline. Mucosa normal. No drainage or sinus tenderness. Throat: Lips, mucosa, and tongue moist..   Neck: Supple, symmetrical, trachea midline, no adenopathy, no carotid bruit and no JVD. Back:    No CVA tenderness. Lungs:   Clear to auscultation bilaterally. Chest wall:  No tenderness or deformity. Heart:  Regular rate and rhythm, S1, S2 normal, no murmur, click, rub or gallop. Abdomen:   Soft, apparently non-tender. Bowel sounds normal. No masses,  No organomegaly. Extremities: no cyanosis or edema. No calf tenderness or cords. Skin:  turgor fairl. Chronic ulcer right hip looks unchangedor lesions   Neurologic:  Alert and oriented X 0. She will say a few more words today. No cogwheeling or rigidity. Gait not tested at this time. Sensation grossly normal to touch. Gross motor of extremities normal.       Data Review:    AB CT 1/8/18: INDICATION: Vomiting, abdominal pain. Nausea.   Exam: Noncontrast CT of the abdomen and pelvis is performed with 5 mm  collimation. Sagittal and coronal reformatted images were also performed.   FINDINGS:  There is mild bibasilar atelectasis. Abdomen:   Liver: The liver is normal on noncontrast images. Spleen: The spleen is normal on noncontrast images. Adrenals: The adrenals are normal on noncontrast images. Pancreas: Pancreas is atrophic in appearance. Gallbladder: The gallbladder is normal on noncontrast images. Kidneys: There is no perinephric stranding, hydronephrosis or hydroureter. No  renal, ureteral bladder calculus is visualized. Bowel: No thickened or dilated loop of large or small bowel seen. Abdominal aorta: Extensive atherosclerotic calcifications are noted within the  abdominal aorta. There is 2.7 cm fusiform ectasia of the distal abdominal aorta,  measuring 2.4 cm on prior CT dated December 2014. Pelvis: Urinary bladder is partially filled and grossly normal.  Bones:  There is an age indeterminate moderate L3 compression deformity, new  compared to prior CT dated December 2014. Osseous structures are diffusely  demineralized. Miscellaneous: Within the left hemipelvis, there is a 1.6 cm x 4.9 cm  thin-walled collection representing residual seroma related to prior hemorrhage  seen on prior CT dated December 2014. There is no free intraperitoneal gas or  fluid. There is a beam hardening artifact within the pelvis secondary to right  hip prosthesis. IMPRESSION:   1. No bowel obstruction, ileus or perforation. 2. Age indeterminant moderate L3 compression deformity. Recent Days:  Recent Labs      01/09/18   0129  01/08/18   1430   WBC  7.7  10.8   HGB  11.3*  12.7   HCT  33.8*  40.1   PLT  169  198     Recent Labs      01/09/18   0129  01/08/18   1430   NA  140  139   K  3.2*  3.3*   CL  106  102   CO2  21  25   GLU  70  86   BUN  29*  31*   CREA  2.27*  2.64*   CA  8.3*  9.2   MG  2.3  1.7   ALB   --   3.2*   TBILI   --   0.4   SGOT   --   11*   ALT   --   8*     No results for input(s): PH, PCO2, PO2, HCO3, FIO2 in the last 72 hours. 24 Hour Results:  No results found for this or any previous visit (from the past 24 hour(s)).     Medications reviewed  Current Facility-Administered Medications   Medication Dose Route Frequency    sodium chloride (NS) flush 5-10 mL  5-10 mL IntraVENous Q8H    sodium chloride (NS) flush 5-10 mL  5-10 mL IntraVENous PRN    heparin (porcine) injection 5,000 Units  5,000 Units SubCUTAneous Q8H    buPROPion XL (WELLBUTRIN XL) tablet 300 mg  300 mg Oral DAILY    venlafaxine-SR (EFFEXOR-XR) capsule 225 mg  225 mg Oral DAILY    levothyroxine (SYNTHROID) tablet 50 mcg  50 mcg Oral ACB    cefTRIAXone (ROCEPHIN) 1 g in 0.9% sodium chloride (MBP/ADV) 50 mL  1 g IntraVENous Q24H    0.9% sodium chloride infusion  100 mL/hr IntraVENous CONTINUOUS    polyethylene glycol (MIRALAX) packet 17 g  17 g Oral BID    senna-docusate (PERICOLACE) 8.6-50 mg per tablet 1 Tab 1 Tab Oral BID    aspirin chewable tablet 81 mg  81 mg Oral DAILY       Care Plan discussed with: Patient and Nurse    Total time spent with patient: 30 minutes.     Yennifer Bailey MD

## 2018-01-10 NOTE — CDMP QUERY
2. Please clarify if this patient is being treated/managed for:    =>Hypokalemia in the setting of Serum K 2.8/3.3/3.2; KCL supplements given  =>Other Explanation of clinical findings  =>Unable to Determine (no explanation of clinical findings)    The medical record reflects the following clinical findings, treatment, and risk factors:    Risk Factors: Failure to thrive    Clinical Indicators: See above labs    Treatment: Monitor daily labs; KCL supplements as ordered    Please clarify and document your clinical opinion in the progress notes and discharge summary including the definitive and/or presumptive diagnosis, (suspected or probable), related to the above clinical findings. Please include clinical findings supporting your diagnosis.     Thank you,  Opal Healy, MSN, [address] [place] [address]

## 2018-01-10 NOTE — CDMP QUERY
3. Please clarify if this patient is being treated/managed for:    =>Metabolic Encephalopathy POA in the setting of AMS/confusion/UTI  =>Other Explanation of clinical findings  =>Unable to Determine (no explanation of clinical findings)    The medical record reflects the following clinical findings, treatment, and risk factors:    Risk Factors: UTI    Clinical Indicators: ED notes: admitted with AMS; lethargy, not speaking per family; less participatory with ADLs, 1/9 Not oriented at all 1/10 more alert today and able to speak  in sentences    Treatment: Increased safety level, orient frequently; avoid over sedating medications    Please clarify and document your clinical opinion in the progress notes and discharge summary including the definitive and/or presumptive diagnosis, (suspected or probable), related to the above clinical findings. Please include clinical findings supporting your diagnosis.     Thank you,  Minnie Mcburney, MSN, [address] [place] [address]

## 2018-01-10 NOTE — PROGRESS NOTES
CM Note:  Spoke with pt's daughter & Diallo Piper. She stated Medicaid application has been completed. A referral was sent in [facility] to [place]. [facility] completed.   PROMISE Holley

## 2018-01-10 NOTE — CDMP QUERY
1. Please clarify if this patient is being treated/managed for:    =>Severe Acute Malnutrition as evidenced by moderate muscle wasting, poor PO intake, , weight loss (16% in < 1 month)   =>Other Explanation of clinical findings  =>Unable to Determine (no explanation of clinical findings)    The medical record reflects the following clinical findings, treatment, and risk factors:    Risk Factors: Failure to thrive    Clinical Indicators: 1/9 RD consult:  Meets Criteria for Severe Acute Malnutrition as evidenced by:  1. Moderate muscle wasting, loss of subcutaneous fat  2. Nutritional intake of <50% of recommended intake for >5 days  3. Weight loss of >1-2% in 1 week, >5% in 1 month, >7.5% in 3 months, or >10% in 6 months    Treatment: RD consult: Regular diet, ensure compact; monitor intake/weight    Please clarify and document your clinical opinion in the progress notes and discharge summary including the definitive and/or presumptive diagnosis, (suspected or probable), related to the above clinical findings. Please include clinical findings supporting your diagnosis.     Thank you,  Tayler Mart, RADHA, [place] [address]

## 2018-01-11 LAB
ALBUMIN SERPL-MCNC: 2.5 G/DL (ref 3.5–5)
ALBUMIN/GLOB SERPL: 1 {RATIO} (ref 1.1–2.2)
ALP SERPL-CCNC: 67 U/L (ref 45–117)
ALT SERPL-CCNC: 8 U/L (ref 12–78)
ANION GAP SERPL CALC-SCNC: 10 MMOL/L (ref 5–15)
AST SERPL-CCNC: 12 U/L (ref 15–37)
BASOPHILS # BLD: 0 K/UL (ref 0–0.1)
BASOPHILS NFR BLD: 0 % (ref 0–1)
BILIRUB SERPL-MCNC: 0.2 MG/DL (ref 0.2–1)
BUN SERPL-MCNC: 17 MG/DL (ref 6–20)
BUN/CREAT SERPL: 9 (ref 12–20)
CALCIUM SERPL-MCNC: 8.5 MG/DL (ref 8.5–10.1)
CHLORIDE SERPL-SCNC: 107 MMOL/L (ref 97–108)
CO2 SERPL-SCNC: 23 MMOL/L (ref 21–32)
CREAT SERPL-MCNC: 1.86 MG/DL (ref 0.55–1.02)
EOSINOPHIL # BLD: 1.5 K/UL (ref 0–0.4)
EOSINOPHIL NFR BLD: 18 % (ref 0–7)
ERYTHROCYTE [DISTWIDTH] IN BLOOD BY AUTOMATED COUNT: 14.7 % (ref 11.5–14.5)
GLOBULIN SER CALC-MCNC: 2.6 G/DL (ref 2–4)
GLUCOSE SERPL-MCNC: 78 MG/DL (ref 65–100)
HCT VFR BLD AUTO: 33 % (ref 35–47)
HGB BLD-MCNC: 11.1 G/DL (ref 11.5–16)
LYMPHOCYTES # BLD: 1.1 K/UL (ref 0.8–3.5)
LYMPHOCYTES NFR BLD: 13 % (ref 12–49)
MCH RBC QN AUTO: 27.8 PG (ref 26–34)
MCHC RBC AUTO-ENTMCNC: 33.6 G/DL (ref 30–36.5)
MCV RBC AUTO: 82.5 FL (ref 80–99)
MONOCYTES # BLD: 0.7 K/UL (ref 0–1)
MONOCYTES NFR BLD: 8 % (ref 5–13)
NEUTS SEG # BLD: 5 K/UL (ref 1.8–8)
NEUTS SEG NFR BLD: 61 % (ref 32–75)
PLATELET # BLD AUTO: 201 K/UL (ref 150–400)
POTASSIUM SERPL-SCNC: 2.8 MMOL/L (ref 3.5–5.1)
PROT SERPL-MCNC: 5.1 G/DL (ref 6.4–8.2)
RBC # BLD AUTO: 4 M/UL (ref 3.8–5.2)
RBC MORPH BLD: ABNORMAL
SODIUM SERPL-SCNC: 140 MMOL/L (ref 136–145)
WBC # BLD AUTO: 8.3 K/UL (ref 3.6–11)

## 2018-01-11 PROCEDURE — 85025 COMPLETE CBC W/AUTO DIFF WBC: CPT | Performed by: FAMILY MEDICINE

## 2018-01-11 PROCEDURE — 77030038269 HC DRN EXT URIN PURWCK BARD -A

## 2018-01-11 PROCEDURE — 74011000258 HC RX REV CODE- 258: Performed by: INTERNAL MEDICINE

## 2018-01-11 PROCEDURE — 74011250636 HC RX REV CODE- 250/636: Performed by: INTERNAL MEDICINE

## 2018-01-11 PROCEDURE — 74011250637 HC RX REV CODE- 250/637: Performed by: FAMILY MEDICINE

## 2018-01-11 PROCEDURE — 74011250637 HC RX REV CODE- 250/637: Performed by: INTERNAL MEDICINE

## 2018-01-11 PROCEDURE — 74011250636 HC RX REV CODE- 250/636: Performed by: FAMILY MEDICINE

## 2018-01-11 PROCEDURE — 80053 COMPREHEN METABOLIC PANEL: CPT | Performed by: FAMILY MEDICINE

## 2018-01-11 PROCEDURE — 65660000000 HC RM CCU STEPDOWN

## 2018-01-11 PROCEDURE — 36415 COLL VENOUS BLD VENIPUNCTURE: CPT | Performed by: FAMILY MEDICINE

## 2018-01-11 RX ORDER — POTASSIUM CHLORIDE 750 MG/1
10 TABLET, FILM COATED, EXTENDED RELEASE ORAL 2 TIMES DAILY
Status: DISCONTINUED | OUTPATIENT
Start: 2018-01-11 | End: 2018-01-12 | Stop reason: HOSPADM

## 2018-01-11 RX ORDER — ONDANSETRON 2 MG/ML
4 INJECTION INTRAMUSCULAR; INTRAVENOUS
Status: DISCONTINUED | OUTPATIENT
Start: 2018-01-11 | End: 2018-01-12 | Stop reason: HOSPADM

## 2018-01-11 RX ORDER — POTASSIUM CHLORIDE 7.45 MG/ML
10 INJECTION INTRAVENOUS
Status: DISPENSED | OUTPATIENT
Start: 2018-01-11 | End: 2018-01-11

## 2018-01-11 RX ORDER — POTASSIUM CHLORIDE 7.45 MG/ML
10 INJECTION INTRAVENOUS
Status: COMPLETED | OUTPATIENT
Start: 2018-01-11 | End: 2018-01-12

## 2018-01-11 RX ORDER — ONDANSETRON 2 MG/ML
4 INJECTION INTRAMUSCULAR; INTRAVENOUS
Status: COMPLETED | OUTPATIENT
Start: 2018-01-11 | End: 2018-01-11

## 2018-01-11 RX ADMIN — POLYETHYLENE GLYCOL 3350 17 G: 17 POWDER, FOR SOLUTION ORAL at 17:33

## 2018-01-11 RX ADMIN — Medication 10 ML: at 21:53

## 2018-01-11 RX ADMIN — POTASSIUM CHLORIDE 10 MEQ: 750 TABLET, FILM COATED, EXTENDED RELEASE ORAL at 17:22

## 2018-01-11 RX ADMIN — POTASSIUM CHLORIDE 10 MEQ: 750 TABLET, FILM COATED, EXTENDED RELEASE ORAL at 10:44

## 2018-01-11 RX ADMIN — Medication 10 ML: at 14:01

## 2018-01-11 RX ADMIN — Medication 10 ML: at 17:19

## 2018-01-11 RX ADMIN — LEVOTHYROXINE SODIUM 50 MCG: 50 TABLET ORAL at 06:13

## 2018-01-11 RX ADMIN — HEPARIN SODIUM 5000 UNITS: 5000 INJECTION, SOLUTION INTRAVENOUS; SUBCUTANEOUS at 17:18

## 2018-01-11 RX ADMIN — ONDANSETRON 4 MG: 2 INJECTION INTRAMUSCULAR; INTRAVENOUS at 21:53

## 2018-01-11 RX ADMIN — POTASSIUM CHLORIDE 10 MEQ: 10 INJECTION, SOLUTION INTRAVENOUS at 14:01

## 2018-01-11 RX ADMIN — POTASSIUM CHLORIDE 10 MEQ: 10 INJECTION, SOLUTION INTRAVENOUS at 12:20

## 2018-01-11 RX ADMIN — VENLAFAXINE HYDROCHLORIDE 225 MG: 150 CAPSULE, EXTENDED RELEASE ORAL at 10:43

## 2018-01-11 RX ADMIN — TRAZODONE HYDROCHLORIDE 25 MG: 50 TABLET ORAL at 21:55

## 2018-01-11 RX ADMIN — POTASSIUM CHLORIDE 10 MEQ: 10 INJECTION, SOLUTION INTRAVENOUS at 10:43

## 2018-01-11 RX ADMIN — DOCUSATE SODIUM AND SENNOSIDES 1 TABLET: 8.6; 5 TABLET, FILM COATED ORAL at 10:43

## 2018-01-11 RX ADMIN — DOCUSATE SODIUM AND SENNOSIDES 1 TABLET: 8.6; 5 TABLET, FILM COATED ORAL at 17:23

## 2018-01-11 RX ADMIN — POLYETHYLENE GLYCOL 3350 17 G: 17 POWDER, FOR SOLUTION ORAL at 10:44

## 2018-01-11 RX ADMIN — CEFTRIAXONE SODIUM 1 G: 1 INJECTION, POWDER, FOR SOLUTION INTRAMUSCULAR; INTRAVENOUS at 17:18

## 2018-01-11 RX ADMIN — BUPROPION HYDROCHLORIDE 300 MG: 150 TABLET, FILM COATED, EXTENDED RELEASE ORAL at 10:43

## 2018-01-11 RX ADMIN — ASPIRIN 81 MG 81 MG: 81 TABLET ORAL at 10:44

## 2018-01-11 RX ADMIN — HEPARIN SODIUM 5000 UNITS: 5000 INJECTION, SOLUTION INTRAVENOUS; SUBCUTANEOUS at 10:44

## 2018-01-11 NOTE — PROGRESS NOTES
Daily Progress Note: 1/11/2018  Maria Fernanda Randle Self-Umlang  Shayan Faith MD    Assessment/Plan:   Nausea and vomiting (1/8/2018) / Chronic constipation:  improved                        -- antiemetics prn                        -- IVF                        -- CT done in ED basically ok - see under Data Review                        -- add bowel regimen with senna and miralax     Failure to thrive in adult (1/8/2018): Has had slow decline, most in the past 2-3 weeks. Decreased po intake. Has been less participatory with ADLs than usual.  Suspect this may be from progression of dementia. -- palliative care consult                        -- appropriate for hospice care as well if not improved with conservative measures                        -- would avoid trazodone and ativan     UTI (urinary tract infection) (7/2/2017): Had UTI treated last month with nitrofurantoin and was diagnosed last week with UTI as well. Initially on cipro but changed to nitrofurantoin this weekend. -- ceftriaxone iv alone for now     CKD (chronic kidney disease) stage 4, GFR 15-29 ml/min ([facility]) (1/8/2018):  Creatinine seems stable from prior                        -- monitor creatinine                        -- IVF                        -- avoid NSAIDs     Hypothyroidism (10/19/2014): On low dose LT4.                        -- check TSH                        -- continue LT4     Metabolic Encephalopathy POA in the setting of AMS/confusion/UTI/Benzos on top of Chronic Alz type Dementia:  Much more alert since DC of benzos                        -- avoid sedating medications                        -- orient frequently                                         -- will continue wellbutrin / effexor     Abnormal EKG:  Patient with diffuse TWI that is new compared to EKG from 7/2017. Unable to determine symptoms of ischemia. Troponin negative in ED.   However, given poor overall prognosis, not likely to benefit from invasive cardiac testing. Could be from nitrofurantoin.                         -- trend troponin reveals less than 0.04                        -- added low dose ASA for now    Hypokalemia:  Replace IV/po as needed     DNR confirmed with POA (granddaughter, Walt Staggers).        Problem List:  Problem List as of 1/11/2018  Date Reviewed: 1/8/2018          Codes Class Noted - Resolved    * (Principal)Nausea and vomiting ICD-10-CM: R11.2  ICD-9-CM: 787.01  1/8/2018 - Present        Failure to thrive in adult ICD-10-CM: R62.7  ICD-9-CM: 783.7  1/8/2018 - Present        CKD (chronic kidney disease) stage 4, GFR 15-29 ml/min ([facility] ICD-10-CM: N18.4  ICD-9-CM: 585.4  1/8/2018 - Present        Dementia ICD-10-CM: F03.90  ICD-9-CM: 294.20  1/8/2018 - Present        Hyperkalemia ICD-10-CM: E87.5  ICD-9-CM: 276.7  7/17/2017 - Present        Altered mental status ICD-10-CM: R41.82  ICD-9-CM: 780.97  7/17/2017 - Present        UTI (urinary tract infection) ICD-10-CM: N39.0  ICD-9-CM: 599.0  7/2/2017 - Present        Acute respiratory failure ([facility] ICD-10-CM: J96.00  ICD-9-CM: 518.81  12/21/2014 - Present        Pulmonary embolism ([facility] ICD-10-CM: I26.99  ICD-9-CM: 415.19  12/11/2014 - Present        NIMCO (acute kidney injury) ([facility] ICD-10-CM: N17.9  ICD-9-CM: 584.9  12/11/2014 - Present        Renal lesion ICD-10-CM: N28.9  ICD-9-CM: 593.9  11/5/2014 - Present        Autoimmune hemolytic anemia ([facility] ICD-10-CM: D59.1  ICD-9-CM: 283.0  11/5/2014 - Present        Leukocytosis ICD-10-CM: F82.252  ICD-9-CM: 288.60  10/25/2014 - Present        HTN (hypertension) ICD-10-CM: I10  ICD-9-CM: 401.9  10/19/2014 - Present        Depression ICD-10-CM: F32.9  ICD-9-CM: 380  10/19/2014 - Present        Hypothyroidism ICD-10-CM: E03.9  ICD-9-CM: 244.9  10/19/2014 - Present        Diarrhea ICD-10-CM: R19.7  ICD-9-CM: 787.91  10/19/2014 - Present        Leukopenia ICD-10-CM: Z37.679  ICD-9-CM: 288.50  10/19/2014 - Present Anemia ICD-10-CM: D64.9  ICD-9-CM: 285.9  10/19/2014 - Present        Transaminitis ICD-10-CM: R74.0  ICD-9-CM: 790.4  10/19/2014 - Present        Hyperbilirubinemia ICD-10-CM: E80.6  ICD-9-CM: 782.4  10/19/2014 - Present        Weakness ICD-10-CM: R53.1  ICD-9-CM: 780.79  10/18/2014 - Present              Subjective:    Ms. Fermin Vincent is a 80 y.o.  female who is admitted with Nausea and vomiting and failure to thrive  Ms. Bradley presented to the Emergency Department today with her grand daughter and daughter in law. Hx obtained from family due to advanced dementia. Has has been eating less and more lethargic for the past couple of weeks. Family initially noted that patient was less able to help with ADLs. Usually patient would help to transfer herself and dress herself, but now relying fully on family. Was seen by PCP and diagnosed with UTI last wee as family noted urine to be very malodorous and patient did c/o pain with urination and grabbing self when urinating. Initially on cipro, but changed to nitrofurantoin. Family notes patient has been worse since starting nitrofurantoin. Found this am with dried emesis on clothing. (Dr Gricelda Jones)     1/9: Not oriented at all. Looks in no distress. Nurses report no N/V overnight. K+ low at 3.2 - replacing. 1/10:  More alert today and able to speak in sentences - content often poor. She is able to say \"want to go home. \"  No further N/V. AM labs pending. Cont Rocephin for now.     1/11: She remains much more alert. She does not know her location/day/date but will follow one and occas 2 step commands. Better po intake. K+ low again at 2.8 - replace po and IV. Much more alert since DC of benzos. Review of Systems:   Review of systems not obtained due to patient factors.     Objective:   Physical Exam:     Visit Vitals    /71 (BP 1 Location: Right arm, BP Patient Position: At rest)    Pulse 69    Temp 98.7 °F (37.1 °C)    Resp 15  Ht 5' (1.524 m)    Wt 43.1 kg (95 lb)    SpO2 97%    BMI 18.55 kg/m2      O2 Device: Room air    Temp (24hrs), Av °F (36.7 °C), Min:97.4 °F (36.3 °C), Max:98.7 °F (37.1 °C)         1901 -  0700  In: 2210 [P.O.:120; I.V.:4730]  Out: 950 [Urine:950]    General:   no distress, appears stated age; thin   Head:  Normocephalic, without obvious abnormality, atraumatic. Eyes:  Conjunctivae/corneas clear. EOMs intact. Nose: Nares normal. Septum midline. Mucosa normal. No drainage or sinus tenderness. Throat: Lips, mucosa, and tongue moist..   Neck: Supple, symmetrical, trachea midline, no adenopathy, no carotid bruit and no JVD. Back:    No CVA tenderness. Lungs:   Clear to auscultation bilaterally. Chest wall:  No tenderness or deformity. Heart:  Regular rate and rhythm, S1, S2 normal, no murmur, click, rub or gallop. Abdomen:   Soft, apparently non-tender. Bowel sounds normal. No masses,  No organomegaly. Extremities: no cyanosis or edema. No calf tenderness or cords. Skin:  turgor fairl. Chronic ulcer right hip looks unchangedor lesions   Neurologic:  Alert and oriented X 0. She is more alert today. No cogwheeling or rigidity. Gait not tested at this time. Sensation grossly normal to touch. Gross motor of extremities normal.       Data Review:    AB CT 18: INDICATION: Vomiting, abdominal pain. Nausea.   Exam: Noncontrast CT of the abdomen and pelvis is performed with 5 mm  collimation. Sagittal and coronal reformatted images were also performed.   FINDINGS:  There is mild bibasilar atelectasis. Abdomen:   Liver: The liver is normal on noncontrast images. Spleen: The spleen is normal on noncontrast images. Adrenals: The adrenals are normal on noncontrast images. Pancreas: Pancreas is atrophic in appearance. Gallbladder: The gallbladder is normal on noncontrast images. Kidneys: There is no perinephric stranding, hydronephrosis or hydroureter.  No  renal, ureteral bladder calculus is visualized. Bowel: No thickened or dilated loop of large or small bowel seen. Abdominal aorta: Extensive atherosclerotic calcifications are noted within the  abdominal aorta. There is 2.7 cm fusiform ectasia of the distal abdominal aorta,  measuring 2.4 cm on prior CT dated December 2014. Pelvis: Urinary bladder is partially filled and grossly normal.  Bones: There is an age indeterminate moderate L3 compression deformity, new  compared to prior CT dated December 2014. Osseous structures are diffusely  demineralized. Miscellaneous: Within the left hemipelvis, there is a 1.6 cm x 4.9 cm  thin-walled collection representing residual seroma related to prior hemorrhage  seen on prior CT dated December 2014. There is no free intraperitoneal gas or  fluid. There is a beam hardening artifact within the pelvis secondary to right  hip prosthesis. IMPRESSION:   1. No bowel obstruction, ileus or perforation. 2. Age indeterminant moderate L3 compression deformity. Recent Days:  Recent Labs      01/11/18   0549  01/09/18   0129  01/08/18   1430   WBC  8.3  7.7  10.8   HGB  11.1*  11.3*  12.7   HCT  33.0*  33.8*  40.1   PLT  201  169  198     Recent Labs      01/11/18   0549  01/09/18   0129  01/08/18   1430   NA  140  140  139   K  2.8*  3.2*  3.3*   CL  107  106  102   CO2  23  21  25   GLU  78  70  86   BUN  17  29*  31*   CREA  1.86*  2.27*  2.64*   CA  8.5  8.3*  9.2   MG   --   2.3  1.7   ALB  2.5*   --   3.2*   TBILI  0.2   --   0.4   SGOT  12*   --   11*   ALT  8*   --   8*     No results for input(s): PH, PCO2, PO2, HCO3, FIO2 in the last 72 hours.     24 Hour Results:  Recent Results (from the past 24 hour(s))   CBC WITH AUTOMATED DIFF    Collection Time: 01/11/18  5:49 AM   Result Value Ref Range    WBC 8.3 3.6 - 11.0 K/uL    RBC 4.00 3.80 - 5.20 M/uL    HGB 11.1 (L) 11.5 - 16.0 g/dL    HCT 33.0 (L) 35.0 - 47.0 %    MCV 82.5 80.0 - 99.0 FL    MCH 27.8 26.0 - 34.0 PG    MCHC 33.6 30.0 - 36.5 g/dL    RDW 14.7 (H) 11.5 - 14.5 %    PLATELET 127 777 - 031 K/uL    NEUTROPHILS PENDING %    LYMPHOCYTES PENDING %    MONOCYTES PENDING %    EOSINOPHILS PENDING %    BASOPHILS PENDING %    ABS. NEUTROPHILS PENDING K/UL    ABS. LYMPHOCYTES PENDING K/UL    ABS. MONOCYTES PENDING K/UL    ABS. EOSINOPHILS PENDING K/UL    ABS. BASOPHILS PENDING K/UL    DF PENDING    METABOLIC PANEL, COMPREHENSIVE    Collection Time: 01/11/18  5:49 AM   Result Value Ref Range    Sodium 140 136 - 145 mmol/L    Potassium 2.8 (L) 3.5 - 5.1 mmol/L    Chloride 107 97 - 108 mmol/L    CO2 23 21 - 32 mmol/L    Anion gap 10 5 - 15 mmol/L    Glucose 78 65 - 100 mg/dL    BUN 17 6 - 20 MG/DL    Creatinine 1.86 (H) 0.55 - 1.02 MG/DL    BUN/Creatinine ratio 9 (L) 12 - 20      GFR est AA 31 (L) >60 ml/min/1.73m2    GFR est non-AA 26 (L) >60 ml/min/1.73m2    Calcium 8.5 8.5 - 10.1 MG/DL    Bilirubin, total 0.2 0.2 - 1.0 MG/DL    ALT (SGPT) 8 (L) 12 - 78 U/L    AST (SGOT) 12 (L) 15 - 37 U/L    Alk.  phosphatase 67 45 - 117 U/L    Protein, total 5.1 (L) 6.4 - 8.2 g/dL    Albumin 2.5 (L) 3.5 - 5.0 g/dL    Globulin 2.6 2.0 - 4.0 g/dL    A-G Ratio 1.0 (L) 1.1 - 2.2         Medications reviewed  Current Facility-Administered Medications   Medication Dose Route Frequency    traZODone (DESYREL) tablet 25 mg  25 mg Oral QHS    sodium chloride (NS) flush 5-10 mL  5-10 mL IntraVENous Q8H    sodium chloride (NS) flush 5-10 mL  5-10 mL IntraVENous PRN    heparin (porcine) injection 5,000 Units  5,000 Units SubCUTAneous Q8H    buPROPion XL (WELLBUTRIN XL) tablet 300 mg  300 mg Oral DAILY    venlafaxine-SR (EFFEXOR-XR) capsule 225 mg  225 mg Oral DAILY    levothyroxine (SYNTHROID) tablet 50 mcg  50 mcg Oral ACB    cefTRIAXone (ROCEPHIN) 1 g in 0.9% sodium chloride (MBP/ADV) 50 mL  1 g IntraVENous Q24H    0.9% sodium chloride infusion  100 mL/hr IntraVENous CONTINUOUS    polyethylene glycol (MIRALAX) packet 17 g  17 g Oral BID    senna-docusate (PERICOLACE) 8.6-50 mg per tablet 1 Tab  1 Tab Oral BID    aspirin chewable tablet 81 mg  81 mg Oral DAILY       Care Plan discussed with: Patient and Nurse  Total time spent with patient: 30 minutes.   Jg Orozco MD

## 2018-01-11 NOTE — ROUTINE PROCESS
Bedside and Verbal shift change report given to Blanco Bagley (oncoming nurse) by Beatriz Rodriguez (offgoing nurse). Report included the following information SBAR, Kardex, Accordion and Recent Results.

## 2018-01-11 NOTE — CONSULTS
Palliative Medicine Consult    Patient Name: Maria Fernanda Gonzalez  YOB: 1932    Date of Initial Consult: 1/11/18  Reason for Consult: Care decisions  Requesting Provider: Dr. Bonnie Gutierrez   Primary Care Physician: Wolf Lainez MD      SUMMARY:   José Luis Martin is a 80 y.o. with a past history of advanced dementia, CKD, Hypothyroidism  who was admitted on 1/8/2018 from home with a diagnosis of FTT/UTI Current medical issues leading to Palliative Medicine involvement include: Care decisions    Chart reviewed/HPI-patient admitted with overall FTT/UTI. Patient with advanced dementia and per the notes, patient with decline, especially worse over the last 2-3 weeks. Patient with possible UTI and currently being treated         PALLIATIVE DIAGNOSES:   1. GOC discussion  2. Debility  3. Advanced dementia, unclear of FAST score at this time until talk with family  4. FTT  5. Malnutrition-albumin of 2.5       PLAN:   1. Tracie(LCSW) and I met with patient in the room. No family present. Patient very emotional and anxious at times. Unable to participate in the understanding her medical issues because of her dementia  2. Spoke with Maria Fernanda Mcfarland(ANTHONY) via phone and plan on family meeting at 8 AM tomorrow  3. GOC-for now, full restorative measures but will address further tomorrow  4. AMD-in chart and reviewed. No changes  5. Code status-discussion with family at admission states DNAR/DNI. Will address again tomorrow at family meeting and secure a DDNR for discharge  6. Symptom management-no acute symptoms for our team to manage tomorrow  7. Psychosocial-will have a better sense tomorrow after family meeting about support in the home and long term goals for patient  8. Initial consult note routed to primary continuity provider  9. Communicated plan of care with: Palliative IDT       GOALS OF CARE / TREATMENT PREFERENCES:   [====Goals of Care====]  GOALS OF CARE:  Patient/Health Care Proxy Stated Goals:  Other (comment) (Continue current theapy for now)      TREATMENT PREFERENCES:   Code Status: DNR    Advance Care Planning:  Advance Care Planning 1/8/2018   Patient's Healthcare Decision Maker is: Legal Next of Ellie Green   Primary Decision Maker Name Sin Dickson   Primary Decision Maker Phone Number [phone]   Primary Decision Maker Relationship to Patient Other relative   Secondary Decision Maker Name -   Secondary Decision [address] Phone Number -   Secondary Decision Maker Relationship to Patient -   Confirm Advance Directive Yes, not on file   Does the patient have other document types -       Medical Interventions: Limited additional interventions  Other Instructions: The palliative care team has discussed with patient / health care proxy about goals of care / treatment preferences for patient.  [====Goals of Care====]         HISTORY:     History obtained from: chart, bedside nurse, Maria Fernanda Mcfarland    CHIEF COMPLAINT: worsening confusion    HPI/SUBJECTIVE:    The patient is:   [x] Verbal and participatory  [] Non-participatory due to:   Patient is verbal but very anxious. No pain     Clinical Pain Assessment (nonverbal scale for severity on nonverbal patients):   Clinical Pain Assessment  Severity: 0    Adult Nonverbal Pain Scale  Face: No particular expression or smile  Activity (Movement): Lying quietly, normal position  Guarding: Lying quietly, no positioning of hands over areas of body  Physiology (Vital Signs):  Stable vital signs  Respiratory: Baseline RR/SpO2 compliant with ventilator  Total Score: 0       FUNCTIONAL ASSESSMENT:     Palliative Performance Scale (PPS):  PPS: 40       PSYCHOSOCIAL/SPIRITUAL SCREENING:     Advance Care Planning:  Advance Care Planning 1/8/2018   Patient's Healthcare Decision Maker is: Legal Next of Bryon   Primary Decision Maker Name Sin Dickson   Primary Decision Maker Phone Number [phone]   Primary Decision Maker Relationship to Patient Other relative   Secondary Decision Maker Name -   Secondary Decision Maker Phone Number -   Secondary Decision Maker Relationship to Patient -   Confirm Advance Directive Yes, not on file   Does the patient have other document types -        Any spiritual / Orthodoxy concerns:  [] Yes /  [x] No    Caregiver Burnout:  [] Yes /  [] No /  [x] No Caregiver Present      Anticipatory grief assessment:   [x] Normal  / [] Maladaptive       ESAS Anxiety:    Not able to answer but appears anxious and guarded  ESAS Depression:   not able to assess with her dementia       REVIEW OF SYSTEMS:     Positive and pertinent negative findings in ROS are noted above in HPI. The following systems were [] reviewed / [x] unable to be reviewed as noted in HPI  Other findings are noted below. Systems: constitutional, ears/nose/mouth/throat, respiratory, gastrointestinal, genitourinary, musculoskeletal, integumentary, neurologic, psychiatric, endocrine. Positive findings noted below. Modified ESAS Completed by: provider   Fatigue: 2 Drowsiness: 0     Pain: 0     Nausea: 3   Anorexia: 2 Dyspnea: 0     Constipation: No              PHYSICAL EXAM:     From RN flowsheet:  Wt Readings from Last 3 Encounters:   01/08/18 95 lb (43.1 kg)   12/14/17 113 lb (51.3 kg)   07/17/17 155 lb (70.3 kg)     Blood pressure 123/89, pulse 85, temperature 97.8 °F (36.6 °C), resp. rate 18, height 5' (1.524 m), weight 95 lb (43.1 kg), SpO2 98 %.     Pain Scale 1: Numeric (0 - 10)  Pain Intensity 1: 0                 Last bowel movement, if known:     Constitutional: anxious, alert to person only  Eyes: pupils equal, anicteric  ENMT: no nasal discharge, moist mucous membranes  Cardiovascular: regular rhythm, distal pulses intact  Respiratory: breathing not labored, symmetric  Gastrointestinal: soft non-tender, +bowel sounds  Musculoskeletal: no deformity, no tenderness to palpation  Skin: warm, dry  Neurologic: following commands, moving all extremities  Psychiatric: confused, following some commands  Other:       HISTORY:     Principal Problem:    Nausea and vomiting (1/8/2018)    Active Problems:    HTN (hypertension) (10/19/2014)      Hypothyroidism (10/19/2014)      UTI (urinary tract infection) (7/2/2017)      Failure to thrive in adult (1/8/2018)      CKD (chronic kidney disease) stage 4, GFR 15-29 ml/min ([address] (1/8/2018)      Dementia (1/8/2018)      Past Medical History:   Diagnosis Date    Arthritis     Asthma     Autoimmune hemolytic anemia (HCC)     Depression     Heart murmur     Hypertension     Hypothyroidism     Pulmonary embolism (HCC)     Thromboembolus (HCC)     Vertigo       Past Surgical History:   Procedure Laterality Date    HX COLONOSCOPY      HX HEENT      HX HYSTERECTOMY      HX ORTHOPAEDIC  R total hip    HX REFRACTIVE SURGERY        Family History   Problem Relation Age of Onset    Family history unknown: Yes      History reviewed, no pertinent family history.   Social History   Substance Use Topics    Smoking status: Never Smoker    Smokeless tobacco: Never Used    Alcohol use No     Allergies   Allergen Reactions    Percocet [Oxycodone-Acetaminophen] Anxiety     Anxiety, shaking     Pcn [Penicillins] Rash     Patient states this happened ~ 40 years ago  Tolerates ceftriaxone    Sulfa (Sulfonamide Antibiotics) Itching and Vertigo      Current Facility-Administered Medications   Medication Dose Route Frequency    potassium chloride SR (KLOR-CON 10) tablet 10 mEq  10 mEq Oral BID    traZODone (DESYREL) tablet 25 mg  25 mg Oral QHS    sodium chloride (NS) flush 5-10 mL  5-10 mL IntraVENous Q8H    sodium chloride (NS) flush 5-10 mL  5-10 mL IntraVENous PRN    heparin (porcine) injection 5,000 Units  5,000 Units SubCUTAneous Q8H    buPROPion XL (WELLBUTRIN XL) tablet 300 mg  300 mg Oral DAILY    venlafaxine-SR (EFFEXOR-XR) capsule 225 mg  225 mg Oral DAILY    levothyroxine (SYNTHROID) tablet 50 mcg  50 mcg Oral ACB    cefTRIAXone (ROCEPHIN) 1 g in 0.9% sodium chloride (MBP/ADV) 50 mL  1 g IntraVENous Q24H    polyethylene glycol (MIRALAX) packet 17 g  17 g Oral BID    senna-docusate (PERICOLACE) 8.6-50 mg per tablet 1 Tab  1 Tab Oral BID    aspirin chewable tablet 81 mg  81 mg Oral DAILY          LAB AND IMAGING FINDINGS:     Lab Results   Component Value Date/Time    WBC 8.3 01/11/2018 05:49 AM    HGB 11.1 01/11/2018 05:49 AM    PLATELET 627 30/93/5795 05:49 AM     Lab Results   Component Value Date/Time    Sodium 140 01/11/2018 05:49 AM    Potassium 2.8 01/11/2018 05:49 AM    Chloride 107 01/11/2018 05:49 AM    CO2 23 01/11/2018 05:49 AM    BUN 17 01/11/2018 05:49 AM    Creatinine 1.86 01/11/2018 05:49 AM    Calcium 8.5 01/11/2018 05:49 AM    Magnesium 2.3 01/09/2018 01:29 AM    Phosphorus 4.4 12/23/2014 08:32 PM      Lab Results   Component Value Date/Time    AST (SGOT) 12 01/11/2018 05:49 AM    Alk. phosphatase 67 01/11/2018 05:49 AM    Protein, total 5.1 01/11/2018 05:49 AM    Albumin 2.5 01/11/2018 05:49 AM    Globulin 2.6 01/11/2018 05:49 AM     Lab Results   Component Value Date/Time    INR 1.0 09/16/2016 12:27 PM    Prothrombin time 9.9 09/16/2016 12:27 PM    aPTT >130.0 12/12/2014 05:15 AM      Lab Results   Component Value Date/Time    Iron 219 10/19/2014 03:00 AM    TIBC 243 10/19/2014 03:00 AM    Iron % saturation 90 10/19/2014 03:00 AM    Ferritin 654 12/11/2014 04:55 PM      Lab Results   Component Value Date/Time    pH 7.38 12/21/2014 03:45 PM    PCO2 35 12/21/2014 03:45 PM    PO2 69 12/21/2014 03:45 PM     No components found for: [place]   Lab Results   Component Value Date/Time    CK 50 07/02/2017 12:58 AM    CK - MB <1.0 07/02/2017 12:58 AM                Total time: 30  Counseling / coordination time, spent as noted above: 25  > 50% counseling / coordination?: yes    Prolonged service was provided for  []30 min   []75 min in face to face time in the presence of the patient, spent as noted above.   Time Start:   Time End:   Note: this can only be billed with 04239 (initial) or 84760 (follow up). If multiple start / stop times, list each separately.

## 2018-01-11 NOTE — PROGRESS NOTES
Bedside shift change report given to Arlet Carter RN (oncoming nurse) by Dea Vallejo RN (offgoing nurse). Report included the following information SBAR, Ami and MAR.

## 2018-01-11 NOTE — PROGRESS NOTES
Palliative Medicine Social Work Note      SW made supportive visit to pt along with Palliative Medicine Physician Dr. Lucas Marte. No family currently present. Pt was awake in bed, confused, labile in her moods, would become tearful without provocation, was able to be redirected at times, did focus on the calming scenes/music on television. Dr. Lucas Marte spoke with family later by phone; family meeting scheduled for Friday 1/12/18 at [place] [address].    Thank you for including Palliative Medicine in the care of Ms. Bradley.     [address] [facility] [place], [facility]  Palliative Medicine:  [phone])

## 2018-01-12 VITALS
OXYGEN SATURATION: 97 % | HEIGHT: 60 IN | BODY MASS INDEX: 18.65 KG/M2 | SYSTOLIC BLOOD PRESSURE: 129 MMHG | WEIGHT: 95 LBS | TEMPERATURE: 98.6 F | HEART RATE: 84 BPM | RESPIRATION RATE: 18 BRPM | DIASTOLIC BLOOD PRESSURE: 79 MMHG

## 2018-01-12 LAB
ALBUMIN SERPL-MCNC: 2.7 G/DL (ref 3.5–5)
ALBUMIN/GLOB SERPL: 1 {RATIO} (ref 1.1–2.2)
ALP SERPL-CCNC: 68 U/L (ref 45–117)
ALT SERPL-CCNC: 6 U/L (ref 12–78)
ANION GAP SERPL CALC-SCNC: 9 MMOL/L (ref 5–15)
AST SERPL-CCNC: 12 U/L (ref 15–37)
BASOPHILS # BLD: 0 K/UL (ref 0–0.1)
BASOPHILS NFR BLD: 0 % (ref 0–1)
BILIRUB SERPL-MCNC: 0.2 MG/DL (ref 0.2–1)
BUN SERPL-MCNC: 16 MG/DL (ref 6–20)
BUN/CREAT SERPL: 8 (ref 12–20)
CALCIUM SERPL-MCNC: 8.4 MG/DL (ref 8.5–10.1)
CHLORIDE SERPL-SCNC: 107 MMOL/L (ref 97–108)
CO2 SERPL-SCNC: 23 MMOL/L (ref 21–32)
CREAT SERPL-MCNC: 1.9 MG/DL (ref 0.55–1.02)
DIFFERENTIAL METHOD BLD: ABNORMAL
EOSINOPHIL # BLD: 1.1 K/UL (ref 0–0.4)
EOSINOPHIL NFR BLD: 12 % (ref 0–7)
ERYTHROCYTE [DISTWIDTH] IN BLOOD BY AUTOMATED COUNT: 15.1 % (ref 11.5–14.5)
GLOBULIN SER CALC-MCNC: 2.8 G/DL (ref 2–4)
GLUCOSE SERPL-MCNC: 86 MG/DL (ref 65–100)
HCT VFR BLD AUTO: 34.9 % (ref 35–47)
HGB BLD-MCNC: 11.7 G/DL (ref 11.5–16)
LYMPHOCYTES # BLD: 1.9 K/UL (ref 0.8–3.5)
LYMPHOCYTES NFR BLD: 21 % (ref 12–49)
MCH RBC QN AUTO: 27.8 PG (ref 26–34)
MCHC RBC AUTO-ENTMCNC: 33.5 G/DL (ref 30–36.5)
MCV RBC AUTO: 82.9 FL (ref 80–99)
MONOCYTES # BLD: 0.4 K/UL (ref 0–1)
MONOCYTES NFR BLD: 4 % (ref 5–13)
NEUTS SEG # BLD: 5.6 K/UL (ref 1.8–8)
NEUTS SEG NFR BLD: 63 % (ref 32–75)
PLATELET # BLD AUTO: 213 K/UL (ref 150–400)
POTASSIUM SERPL-SCNC: 3.2 MMOL/L (ref 3.5–5.1)
PROT SERPL-MCNC: 5.5 G/DL (ref 6.4–8.2)
RBC # BLD AUTO: 4.21 M/UL (ref 3.8–5.2)
RBC MORPH BLD: ABNORMAL
RBC MORPH BLD: ABNORMAL
SODIUM SERPL-SCNC: 139 MMOL/L (ref 136–145)
WBC # BLD AUTO: 9 K/UL (ref 3.6–11)

## 2018-01-12 PROCEDURE — 77030038269 HC DRN EXT URIN PURWCK BARD -A

## 2018-01-12 PROCEDURE — L3710 EO ELAS W/METAL JNTS PRE OTS: HCPCS

## 2018-01-12 PROCEDURE — 74011000258 HC RX REV CODE- 258: Performed by: INTERNAL MEDICINE

## 2018-01-12 PROCEDURE — 74011250637 HC RX REV CODE- 250/637: Performed by: INTERNAL MEDICINE

## 2018-01-12 PROCEDURE — 74011250636 HC RX REV CODE- 250/636: Performed by: INTERNAL MEDICINE

## 2018-01-12 PROCEDURE — 74011250636 HC RX REV CODE- 250/636: Performed by: FAMILY MEDICINE

## 2018-01-12 PROCEDURE — 85025 COMPLETE CBC W/AUTO DIFF WBC: CPT | Performed by: FAMILY MEDICINE

## 2018-01-12 PROCEDURE — 36415 COLL VENOUS BLD VENIPUNCTURE: CPT | Performed by: FAMILY MEDICINE

## 2018-01-12 PROCEDURE — 77030029211 HC GEL MEDIH TU INLC -B

## 2018-01-12 PROCEDURE — 77030011256 HC DRSG MEPILEX <16IN NO BORD MOLN -A

## 2018-01-12 PROCEDURE — 80053 COMPREHEN METABOLIC PANEL: CPT | Performed by: FAMILY MEDICINE

## 2018-01-12 PROCEDURE — 74011250637 HC RX REV CODE- 250/637: Performed by: FAMILY MEDICINE

## 2018-01-12 RX ORDER — TRAZODONE HYDROCHLORIDE 50 MG/1
25 TABLET ORAL
Qty: 30 TAB | Refills: 0 | Status: SHIPPED
Start: 2018-01-12

## 2018-01-12 RX ORDER — GUAIFENESIN 100 MG/5ML
81 LIQUID (ML) ORAL DAILY
Qty: 30 TAB | Refills: 0 | Status: SHIPPED
Start: 2018-01-13

## 2018-01-12 RX ORDER — POTASSIUM CHLORIDE 7.45 MG/ML
10 INJECTION INTRAVENOUS
Status: COMPLETED | OUTPATIENT
Start: 2018-01-12 | End: 2018-01-12

## 2018-01-12 RX ORDER — POTASSIUM CHLORIDE 750 MG/1
10 TABLET, FILM COATED, EXTENDED RELEASE ORAL 2 TIMES DAILY
Qty: 60 TAB | Refills: 0 | Status: SHIPPED
Start: 2018-01-12

## 2018-01-12 RX ADMIN — POTASSIUM CHLORIDE 10 MEQ: 10 INJECTION, SOLUTION INTRAVENOUS at 08:53

## 2018-01-12 RX ADMIN — POLYETHYLENE GLYCOL 3350 17 G: 17 POWDER, FOR SOLUTION ORAL at 17:12

## 2018-01-12 RX ADMIN — HEPARIN SODIUM 5000 UNITS: 5000 INJECTION, SOLUTION INTRAVENOUS; SUBCUTANEOUS at 08:53

## 2018-01-12 RX ADMIN — LEVOTHYROXINE SODIUM 50 MCG: 50 TABLET ORAL at 06:29

## 2018-01-12 RX ADMIN — POTASSIUM CHLORIDE 10 MEQ: 750 TABLET, FILM COATED, EXTENDED RELEASE ORAL at 08:53

## 2018-01-12 RX ADMIN — POTASSIUM CHLORIDE 10 MEQ: 10 INJECTION, SOLUTION INTRAVENOUS at 07:03

## 2018-01-12 RX ADMIN — VENLAFAXINE HYDROCHLORIDE 225 MG: 150 CAPSULE, EXTENDED RELEASE ORAL at 08:53

## 2018-01-12 RX ADMIN — BUPROPION HYDROCHLORIDE 300 MG: 150 TABLET, FILM COATED, EXTENDED RELEASE ORAL at 08:53

## 2018-01-12 RX ADMIN — ASPIRIN 81 MG 81 MG: 81 TABLET ORAL at 08:53

## 2018-01-12 RX ADMIN — POTASSIUM CHLORIDE 10 MEQ: 750 TABLET, FILM COATED, EXTENDED RELEASE ORAL at 17:12

## 2018-01-12 RX ADMIN — DOCUSATE SODIUM AND SENNOSIDES 1 TABLET: 8.6; 5 TABLET, FILM COATED ORAL at 08:53

## 2018-01-12 RX ADMIN — DOCUSATE SODIUM AND SENNOSIDES 1 TABLET: 8.6; 5 TABLET, FILM COATED ORAL at 17:12

## 2018-01-12 RX ADMIN — CEFTRIAXONE SODIUM 1 G: 1 INJECTION, POWDER, FOR SOLUTION INTRAMUSCULAR; INTRAVENOUS at 16:12

## 2018-01-12 RX ADMIN — POLYETHYLENE GLYCOL 3350 17 G: 17 POWDER, FOR SOLUTION ORAL at 08:53

## 2018-01-12 RX ADMIN — HEPARIN SODIUM 5000 UNITS: 5000 INJECTION, SOLUTION INTRAVENOUS; SUBCUTANEOUS at 01:00

## 2018-01-12 NOTE — ROUTINE PROCESS
Bedside and Verbal shift change report given to Emely Weinberg RN (oncoming nurse) by PROMISE Joshua (offgoing nurse). Report included the following information SBAR, Kardex, Procedure Summary, Intake/Output, MAR, Accordion, Recent Results and Med Rec Status.

## 2018-01-12 NOTE — DISCHARGE INSTRUCTIONS
Patient Discharge Instructions    Prudence Bryan / [id number] : 1932    Admitted 2018 Discharged: 2018 2:02 PM     ACUTE DIAGNOSES:  Nausea and vomiting    CHRONIC MEDICAL DIAGNOSES:  Problem List as of 2018  Date Reviewed: 2018          Codes Class Noted - Resolved    * (Principal)Nausea and vomiting ICD-10-CM: R11.2  ICD-9-CM: 787.01  2018 - Present        Failure to thrive in adult ICD-10-CM: R62.7  ICD-9-CM: 783.7  2018 - Present        CKD (chronic kidney disease) stage 4, GFR 15-29 ml/min ([facility]) ICD-10-CM: N18.4  ICD-9-CM: 585.4  2018 - Present        Dementia ICD-10-CM: F03.90  ICD-9-CM: 294.20  2018 - Present        Hyperkalemia ICD-10-CM: E87.5  ICD-9-CM: 276.7  2017 - Present        Altered mental status ICD-10-CM: R41.82  ICD-9-CM: 780.97  2017 - Present        UTI (urinary tract infection) ICD-10-CM: N39.0  ICD-9-CM: 599.0  2017 - Present        Acute respiratory failure ([facility] ICD-10-CM: J96.00  ICD-9-CM: 518.81  2014 - Present        Pulmonary embolism ([facility] ICD-10-CM: I26.99  ICD-9-CM: 415.19  2014 - Present        NIMCO (acute kidney injury) ([facility] ICD-10-CM: N17.9  ICD-9-CM: 584.9  2014 - Present        Renal lesion ICD-10-CM: N28.9  ICD-9-CM: 593.9  2014 - Present        Autoimmune hemolytic anemia ([facility] ICD-10-CM: D59.1  ICD-9-CM: 283.0  2014 - Present        Leukocytosis ICD-10-CM: D72.829  ICD-9-CM: 288.60  10/25/2014 - Present        HTN (hypertension) ICD-10-CM: I10  ICD-9-CM: 401.9  10/19/2014 - Present        Depression ICD-10-CM: F32.9  ICD-9-CM: 778  10/19/2014 - Present        Hypothyroidism ICD-10-CM: E03.9  ICD-9-CM: 244.9  10/19/2014 - Present        Diarrhea ICD-10-CM: R19.7  ICD-9-CM: 787.91  10/19/2014 - Present        Leukopenia ICD-10-CM: D72.819  ICD-9-CM: 288.50  10/19/2014 - Present        Anemia ICD-10-CM: D64.9  ICD-9-CM: 285.9  10/19/2014 - Present        Transaminitis ICD-10-CM: R74.0  ICD-9-CM: 790.4  10/19/2014 - Present        Hyperbilirubinemia ICD-10-CM: E80.6  ICD-9-CM: 782.4  10/19/2014 - Present        Weakness ICD-10-CM: R53.1  ICD-9-CM: 780.79  10/18/2014 - Present              DISCHARGE MEDICATIONS:         · It is important that you take the medication exactly as they are prescribed. · Keep your medication in the bottles provided by the pharmacist and keep a list of the medication names, dosages, and times to be taken in your wallet. · Do not take other medications without consulting your doctor. DIET:  Regular Diet    ACTIVITY: Activity as tolerated    ADDITIONAL INFORMATION: If you experience any of the following symptoms then please call your primary care physician or return to the emergency room if you cannot get hold of your doctor: Fever, chills, nausea, vomiting, diarrhea, change in mentation, falling, bleeding, shortness of breath. FOLLOW UP CARE:  Dr. Juan Luis Finch MD  you are to call and set up an appointment to see them with in 1 week. Follow-up with specialists at directed by them      Information obtained by :  I understand that if any problems occur once I am at home I am to contact my physician. I understand and acknowledge receipt of the instructions indicated above.                                                                                                                                            Physician's or R.N.'s Signature                                                                  Date/Time                                                                                                                                              Patient or Representative Signature                                                          Date/Time

## 2018-01-12 NOTE — DISCHARGE SUMMARY
Physician Discharge Summary     Patient ID:    José Luis Martin  [id number]  80 y.o.  11/2/1932  Wolf Lainez MD    Admit date: 1/8/2018  Discharge date and time: 1/12/2018  Admission Diagnoses: Nausea and vomiting  Chronic Diagnoses:    Problem List as of 1/12/2018  Date Reviewed: 1/8/2018          Codes Class Noted - Resolved    * (Principal)Nausea and vomiting ICD-10-CM: R11.2  ICD-9-CM: 787.01  1/8/2018 - Present        Failure to thrive in adult ICD-10-CM: R62.7  ICD-9-CM: 783.7  1/8/2018 - Present        CKD (chronic kidney disease) stage 4, GFR 15-29 ml/min ([facility]) ICD-10-CM: N18.4  ICD-9-CM: 585.4  1/8/2018 - Present        Dementia ICD-10-CM: F03.90  ICD-9-CM: 294.20  1/8/2018 - Present        Hyperkalemia ICD-10-CM: E87.5  ICD-9-CM: 276.7  7/17/2017 - Present        Altered mental status ICD-10-CM: R41.82  ICD-9-CM: 780.97  7/17/2017 - Present        UTI (urinary tract infection) ICD-10-CM: N39.0  ICD-9-CM: 599.0  7/2/2017 - Present        Acute respiratory failure ([facility] ICD-10-CM: J96.00  ICD-9-CM: 518.81  12/21/2014 - Present        Pulmonary embolism ([facility] ICD-10-CM: I26.99  ICD-9-CM: 415.19  12/11/2014 - Present        NIMCO (acute kidney injury) ([facility] ICD-10-CM: N17.9  ICD-9-CM: 584.9  12/11/2014 - Present        Renal lesion ICD-10-CM: N28.9  ICD-9-CM: 593.9  11/5/2014 - Present        Autoimmune hemolytic anemia ([facility]) ICD-10-CM: D59.1  ICD-9-CM: 283.0  11/5/2014 - Present        Leukocytosis ICD-10-CM: D72.829  ICD-9-CM: 288.60  10/25/2014 - Present        HTN (hypertension) ICD-10-CM: I10  ICD-9-CM: 401.9  10/19/2014 - Present        Depression ICD-10-CM: F32.9  ICD-9-CM: 383  10/19/2014 - Present        Hypothyroidism ICD-10-CM: E03.9  ICD-9-CM: 244.9  10/19/2014 - Present        Diarrhea ICD-10-CM: R19.7  ICD-9-CM: 787.91  10/19/2014 - Present        Leukopenia ICD-10-CM: D72.819  ICD-9-CM: 288.50  10/19/2014 - Present        Anemia ICD-10-CM: D64.9  ICD-9-CM: 285.9  10/19/2014 - Present Transaminitis ICD-10-CM: R74.0  ICD-9-CM: 790.4  10/19/2014 - Present        Hyperbilirubinemia ICD-10-CM: E80.6  ICD-9-CM: 782.4  10/19/2014 - Present        Weakness ICD-10-CM: R53.1  ICD-9-CM: 780.79  10/18/2014 - Present            Discharge Medications:   Current Discharge Medication List      START taking these medications    Details   aspirin 81 mg chewable tablet Take 1 Tab by mouth daily. Qty: 30 Tab, Refills: 0      potassium chloride SR (KLOR-CON 10) 10 mEq tablet Take 1 Tab by mouth two (2) times a day. Qty: 60 Tab, Refills: 0         CONTINUE these medications which have CHANGED    Details   traZODone (DESYREL) 50 mg tablet Take 0.5 Tabs by mouth nightly. Qty: 30 Tab, Refills: 0         CONTINUE these medications which have NOT CHANGED    Details   acetaminophen (TYLENOL EXTRA STRENGTH) 500 mg tablet Take 1,000 mg by mouth every six (6) hours as needed (Mild-Moderate pain). buPROPion XL (WELLBUTRIN XL) 300 mg XL tablet Take 300 mg by mouth every morning. venlafaxine-SR (EFFEXOR-XR) 75 mg capsule Take 75 mg by mouth daily. In addition to 150 mg daily. Total 225 mg daily. venlafaxine-SR (EFFEXOR XR) 150 mg capsule Take 150 mg by mouth daily. In addition to 75 mg daily. Total 225 mg daily. levothyroxine (LEVOTHROID) 50 mcg tablet Take 50 mcg by mouth Daily (before breakfast). STOP taking these medications       nitrofurantoin, macrocrystal-monohydrate, (MACROBID) 100 mg capsule Comments:   Reason for Stopping:         LORazepam (ATIVAN) 0.5 mg tablet Comments:   Reason for Stopping:         acetaminophen-codeine (TYLENOL-CODEINE #3) 300-30 mg per tablet Comments:   Reason for Stopping: Follow up Care:    1. Erwin Baptiste MD with in 1 weeks  2.  specialists as directed. Diet:  Regular Diet  Disposition:  SNF.   Advanced Directive:  Discharge Exam:  [See today's progress note.]  CONSULTATIONS: Palliative care  Significant Diagnostic Studies:   Recent Labs      01/12/18   0338  01/11/18   0549   WBC  9.0  8.3   HGB  11.7  11.1*   HCT  34.9*  33.0*   PLT  213  201     Recent Labs      01/12/18   0338  01/11/18   0549   NA  139  140   K  3.2*  2.8*   CL  107  107   CO2  23  23   BUN  16  17   CREA  1.90*  1.86*   GLU  86  78   CA  8.4*  8.5     Recent Labs      01/12/18   0338  01/11/18   0549   SGOT  12*  12*   ALT  6*  8*   AP  68  67   TBILI  0.2  0.2   TP  5.5*  5.1*   ALB  2.7*  2.5*   GLOB  2.8  2.6     Lab Results   Component Value Date/Time    TSH 0.69 01/09/2018 01:29 AM     HOSPITAL COURSE & TREATMENT RENDERED:   1. See today's progress note:  Daily Progress Note and Discharge Note: 1/12/2018  [facility]  Hardeep Marcos MD    Assessment/Plan:   Nausea and vomiting (1/8/2018) / Chronic constipation:  improved/resolved                        -- CT done in ED basically ok - see under Data Review                        -- add bowel regimen     Failure to thrive in adult (1/8/2018): Has had slow decline, most in the past 2-3 weeks. Decreased po intake. Has been less participatory with ADLs than usual.  Suspect this may be from progression of dementia. -- accepted at nursing home                        -- would avoid benzos ativan     UTI (urinary tract infection) (7/2/2017): Had UTI treated last month with nitrofurantoin and was diagnosed last week with UTI as well. Initially on cipro but changed to nitrofurantoin this weekend. -- ceftriaxone iv given in hosp - watch at [facility]     CKD (chronic kidney disease) stage 4, GFR 15-29 ml/min ([facility]) (1/8/2018):  Creatinine seems stable from prior                        -- monitor creatinine                        -- avoid NSAIDs     Hypothyroidism (10/19/2014):   On low dose LT4.                        -- watch TSH                        -- continue LT4     Metabolic Encephalopathy POA in the setting of AMS/confusion/UTI/Benzos on top of Chronic Alz type Dementia:  Much more alert since DC of benzos                        -- avoid sedating medications                        -- orient frequently                                         -- will continue wellbutrin / effexor     Abnormal EKG:  Patient with diffuse TWI that is new compared to EKG from 7/2017. Unable to determine symptoms of ischemia. Troponin negative in ED. However, given poor overall prognosis, not likely to benefit from invasive cardiac testing. Could be from nitrofurantoin. -- troponin reveals less than 0.04                        -- added low dose ASA for now    Hypokalemia:  Replace IV/po as needed    Severe Acute Malnutrition as evidenced by moderate muscle wasting, poor PO intake, , weight loss (16% in < 1 month)     DNR confirmed with POA (granddaughter, Maria Fernanda).        Subjective:    Ms. Agustin Nick is a 80 y.o.  female who is admitted with Nausea and vomiting and failure to thrive  Ms. Bradley presented to the Emergency Department today with her grand daughter and daughter in law. Hx obtained from family due to advanced dementia. Has has been eating less and more lethargic for the past couple of weeks. Family initially noted that patient was less able to help with ADLs. Usually patient would help to transfer herself and dress herself, but now relying fully on family. Was seen by PCP and diagnosed with UTI last wee as family noted urine to be very malodorous and patient did c/o pain with urination and grabbing self when urinating. Initially on cipro, but changed to nitrofurantoin. Family notes patient has been worse since starting nitrofurantoin. Found this am with dried emesis on clothing. (Dr Shani Vaughn)     1/9: Not oriented at all. Looks in no distress. Nurses report no N/V overnight. K+ low at 3.2 - replacing. 1/10:  More alert today and able to speak in sentences - content often poor. She is able to say \"want to go home. \"  No further N/V.   AM labs pending. Cont Rocephin for now.     : She remains much more alert. She does not know her location/day/date but will follow one and occas 2 step commands. Better po intake. K+ low again at 2.8 - replace po and IV. Much more alert since DC of benzos. :  Remains much more alert. When asked how she is doing she is able to respond \"I think I'm doing better\" and able to respond \"I don't hurt anywhere right now\" when asked if she has any pain. Still does not know day/date/location or remember my name. Family trying to get LTC placement. Family wanted Lorazepam restarted - discussed care with grand daughter yesterday and educated on why benzos should not be generally or routinely used in elderly, demented patients unless other things have been tried. Creat a little higher - watch. K+ still low - replace more IV and po. Add: 2pm: accepted at [facility] - stable for discharge to NH - monitor K+ at [facility]. Review of Systems:   Review of systems not obtained due to patient factors. Objective:   Physical Exam:     Visit Vitals    /67 (BP 1 Location: Right arm, BP Patient Position: At rest)    Pulse 83    Temp 98.1 °F (36.7 °C)    Resp 18    Ht 5' (1.524 m)    Wt 43.1 kg (95 lb)    SpO2 95%    BMI 18.55 kg/m2      O2 Device: Room air    Temp (24hrs), Av.2 °F (36.8 °C), Min:97.8 °F (36.6 °C), Max:98.6 °F (37 °C)    1901 -  0700  In: -   Out: 400 [Urine:400]   01/10 0701 -  190  In: 0495 [P.O.:240; I.V.:5030]  Out: 950 [Urine:950]    General:   no distress, appears stated age; thin   Head:  Normocephalic, without obvious abnormality, atraumatic. Eyes:  Conjunctivae/corneas clear. EOMs intact. Nose: Nares normal. Septum midline. Mucosa normal. No drainage or sinus tenderness. Throat: Lips, mucosa, and tongue moist..   Neck: Supple, symmetrical, trachea midline, no adenopathy, no carotid bruit and no JVD. Back:    No CVA tenderness.    Lungs:   Clear to auscultation bilaterally. Chest wall:  No tenderness or deformity. Heart:  Regular rate and rhythm, S1, S2 normal, no murmur, click, rub or gallop. Abdomen:   Soft, apparently non-tender. Bowel sounds normal. No masses,  No organomegaly. Extremities: no cyanosis or edema. No calf tenderness or cords. Skin:  turgor fairl. Chronic ulcer right hip looks unchangedor lesions   Neurologic:  Alert and oriented X 0. She is more alert today. No cogwheeling or rigidity. Gait not tested at this time. Sensation grossly normal to touch. Gross motor of extremities normal.       Data Review:    AB CT 1/8/18: INDICATION: Vomiting, abdominal pain. Nausea.   Exam: Noncontrast CT of the abdomen and pelvis is performed with 5 mm  collimation. Sagittal and coronal reformatted images were also performed.   FINDINGS:  There is mild bibasilar atelectasis. Abdomen:   Liver: The liver is normal on noncontrast images. Spleen: The spleen is normal on noncontrast images. Adrenals: The adrenals are normal on noncontrast images. Pancreas: Pancreas is atrophic in appearance. Gallbladder: The gallbladder is normal on noncontrast images. Kidneys: There is no perinephric stranding, hydronephrosis or hydroureter. No  renal, ureteral bladder calculus is visualized. Bowel: No thickened or dilated loop of large or small bowel seen. Abdominal aorta: Extensive atherosclerotic calcifications are noted within the  abdominal aorta. There is 2.7 cm fusiform ectasia of the distal abdominal aorta,  measuring 2.4 cm on prior CT dated December 2014. Pelvis: Urinary bladder is partially filled and grossly normal.  Bones: There is an age indeterminate moderate L3 compression deformity, new  compared to prior CT dated December 2014. Osseous structures are diffusely  demineralized.    Miscellaneous: Within the left hemipelvis, there is a 1.6 cm x 4.9 cm  thin-walled collection representing residual seroma related to prior hemorrhage  seen on prior CT dated December 2014. There is no free intraperitoneal gas or  fluid. There is a beam hardening artifact within the pelvis secondary to right  hip prosthesis. IMPRESSION:   1. No bowel obstruction, ileus or perforation. 2. Age indeterminant moderate L3 compression deformity.     Signed:  Álvaro Her MD  1/12/2018  2:03 PM

## 2018-01-12 NOTE — PROGRESS NOTES
Spiritual Care Assessment/Progress Notes    Santino Cunha [id number]  [id number]    11/2/1932  80 y.o.  female    Patient Telephone Number: [phone] (home)   Confucianism Affiliation: Mayra   Language: [place]   Extended Emergency Contact Information  Primary Emergency Contact: Nirmal Christopher, [address] Phone: [phone]  Mobile Phone: [phone]  Relation: Other Relative   Patient Active Problem List    Diagnosis Date Noted    Nausea and vomiting 01/08/2018    Failure to thrive in adult 01/08/2018    CKD (chronic kidney disease) stage 4, GFR 15-29 ml/min ([address] 01/08/2018    Dementia 01/08/2018    Hyperkalemia 07/17/2017    Altered mental status 07/17/2017    UTI (urinary tract infection) 07/02/2017    Acute respiratory failure ([address] 12/21/2014    Pulmonary embolism ([address] 12/11/2014    NIMCO (acute kidney injury) ([address] 12/11/2014    Renal lesion 11/05/2014    Autoimmune hemolytic anemia ([address] 11/05/2014    Leukocytosis 10/25/2014    HTN (hypertension) 10/19/2014    Depression 10/19/2014    Hypothyroidism 10/19/2014    Diarrhea 10/19/2014    Leukopenia 10/19/2014    Anemia 10/19/2014    Transaminitis 10/19/2014    Hyperbilirubinemia 10/19/2014    Weakness 10/18/2014        Date: 1/12/2018       Level of Confucianism/Spiritual Activity:  [x]         Involved in alla tradition/spiritual practice    []         Not involved in alla tradition/spiritual practice  [x]         Spiritually oriented    []         Claims no spiritual orientation    []         seeking spiritual identity  []         Feels alienated from Voodoo practice/tradition  []         Feels angry about Voodoo practice/tradition  [x]         Spirituality/Voodoo tradition is a resource for coping at this time.   []         Not able to assess due to medical condition    Services Provided Today:  []         crisis intervention    []         reading Scriptures  [x] spiritual assessment    [x]         prayer  [x]         empathic listening/emotional support  []         rites and rituals (cite in comments)  [x]         life review     []         Islam support  []         theological development   []         advocacy  []         ethical dialog     [x]         blessing  []         bereavement support    [x]         support to family  []         anticipatory grief support   []         help with AMD  []         spiritual guidance    []         meditation      Spiritual Care Needs  [x]         Emotional Support  [x]         Spiritual/Hoahaoism Care  []         Loss/Adjustment  []         Advocacy/Referral                /Ethics  []         No needs expressed at               this time  []         Other: (note in               comments)  Spiritual Care Plan  []         Follow up visits with               pt/family  []         Provide materials  []         Schedule sacraments  []         Contact Community               Clergy  [x]         Follow up as needed  []         Other: (note in               comments)     Comments:  is visiting for an initial spiritual assessment with pt in room 505. Pt's granddaughter, Sarahi Tamayo, was present at the time. Pt is presenting as positive and upbeat this morning. Pt's granddaughter is a  of a local [employer].  provided spiritual support through words of support, affirmation, blessing with patient, and prayer. Pt expressed thanks for visit, support, and prayer.      02[address] Leatha Marks M.Div, M.S, Steve Garsia4 available at [address] [place] [phone]

## 2018-01-12 NOTE — PROGRESS NOTES
Bedside and Verbal shift change report given to Artur OLMEDO (oncoming nurse) by Jem Cameron (offgoing nurse). Report included the following information SBAR, Kardex, Intake/Output, MAR, Recent Results and Cardiac Rhythm NSR.

## 2018-01-12 NOTE — PROGRESS NOTES
SUKI left voice mails for Chanelle, admissions coordinator at [facility]. Awaiting return call. Darien Zarco LCSW    12:20pm:  Spoke with Chanelle. Plan is to skill pt under Medicare and then transition to LTC. They are able to take pt today. Spoke with Dr. Francisco Bonds, who will discharge pt later today. Discussed with pt's granddaughter, Tonia Elena. Ambulance transport scheduled for pick-up at [address], please call report to: [phone]. Darien Zarco LCSW    UAI has been faxed to [facility] via [employer].   Darien Zarco LCSW

## 2018-01-12 NOTE — PROGRESS NOTES
Daily Progress Note and Discharge Note: 1/12/2018  [facility]  Shannan Barrera MD    Assessment/Plan:   Nausea and vomiting (1/8/2018) / Chronic constipation:  improved/resolved                        -- CT done in ED basically ok - see under Data Review                        -- add bowel regimen     Failure to thrive in adult (1/8/2018): Has had slow decline, most in the past 2-3 weeks. Decreased po intake. Has been less participatory with ADLs than usual.  Suspect this may be from progression of dementia. -- accepted at nursing home                        -- would avoid benzos ativan     UTI (urinary tract infection) (7/2/2017): Had UTI treated last month with nitrofurantoin and was diagnosed last week with UTI as well. Initially on cipro but changed to nitrofurantoin this weekend. -- ceftriaxone iv given in hosp - watch at [facility]     CKD (chronic kidney disease) stage 4, GFR 15-29 ml/min ([facility]) (1/8/2018):  Creatinine seems stable from prior                        -- monitor creatinine                        -- avoid NSAIDs     Hypothyroidism (10/19/2014): On low dose LT4.                        -- watch TSH                        -- continue LT4     Metabolic Encephalopathy POA in the setting of AMS/confusion/UTI/Benzos on top of Chronic Alz type Dementia:  Much more alert since DC of benzos                        -- avoid sedating medications                        -- orient frequently                                         -- will continue wellbutrin / effexor     Abnormal EKG:  Patient with diffuse TWI that is new compared to EKG from 7/2017. Unable to determine symptoms of ischemia. Troponin negative in ED. However, given poor overall prognosis, not likely to benefit from invasive cardiac testing. Could be from nitrofurantoin.                         -- troponin reveals less than 0.04                        -- added low dose ASA for now    Hypokalemia:  Replace IV/po as needed    Severe Acute Malnutrition as evidenced by moderate muscle wasting, poor PO intake, , weight loss (16% in < 1 month)     DNR confirmed with POA (granddaughter, Marin Metcalf).        Problem List:  Problem List as of 1/12/2018  Date Reviewed: 1/8/2018          Codes Class Noted - Resolved    * (Principal)Nausea and vomiting ICD-10-CM: R11.2  ICD-9-CM: 787.01  1/8/2018 - Present        Failure to thrive in adult ICD-10-CM: R62.7  ICD-9-CM: 783.7  1/8/2018 - Present        CKD (chronic kidney disease) stage 4, GFR 15-29 ml/min ([facility] ICD-10-CM: N18.4  ICD-9-CM: 585.4  1/8/2018 - Present        Dementia ICD-10-CM: F03.90  ICD-9-CM: 294.20  1/8/2018 - Present        Hyperkalemia ICD-10-CM: E87.5  ICD-9-CM: 276.7  7/17/2017 - Present        Altered mental status ICD-10-CM: R41.82  ICD-9-CM: 780.97  7/17/2017 - Present        UTI (urinary tract infection) ICD-10-CM: N39.0  ICD-9-CM: 599.0  7/2/2017 - Present        Acute respiratory failure ([facility] ICD-10-CM: J96.00  ICD-9-CM: 518.81  12/21/2014 - Present        Pulmonary embolism ([facility] ICD-10-CM: I26.99  ICD-9-CM: 415.19  12/11/2014 - Present        NIMCO (acute kidney injury) ([facility] ICD-10-CM: N17.9  ICD-9-CM: 584.9  12/11/2014 - Present        Renal lesion ICD-10-CM: N28.9  ICD-9-CM: 593.9  11/5/2014 - Present        Autoimmune hemolytic anemia ([facility] ICD-10-CM: D59.1  ICD-9-CM: 283.0  11/5/2014 - Present        Leukocytosis ICD-10-CM: R89.403  ICD-9-CM: 288.60  10/25/2014 - Present        HTN (hypertension) ICD-10-CM: I10  ICD-9-CM: 401.9  10/19/2014 - Present        Depression ICD-10-CM: F32.9  ICD-9-CM: 911  10/19/2014 - Present        Hypothyroidism ICD-10-CM: E03.9  ICD-9-CM: 244.9  10/19/2014 - Present        Diarrhea ICD-10-CM: R19.7  ICD-9-CM: 787.91  10/19/2014 - Present        Leukopenia ICD-10-CM: D72.819  ICD-9-CM: 288.50  10/19/2014 - Present        Anemia ICD-10-CM: D64.9  ICD-9-CM: 285.9  10/19/2014 - Present Transaminitis ICD-10-CM: R74.0  ICD-9-CM: 790.4  10/19/2014 - Present        Hyperbilirubinemia ICD-10-CM: E80.6  ICD-9-CM: 782.4  10/19/2014 - Present        Weakness ICD-10-CM: R53.1  ICD-9-CM: 780.79  10/18/2014 - Present              Subjective:    Ms. Marisa Tabares is a 80 y.o.  female who is admitted with Nausea and vomiting and failure to thrive  Ms. Bradley presented to the Emergency Department today with her grand daughter and daughter in law. Hx obtained from family due to advanced dementia. Has has been eating less and more lethargic for the past couple of weeks. Family initially noted that patient was less able to help with ADLs. Usually patient would help to transfer herself and dress herself, but now relying fully on family. Was seen by PCP and diagnosed with UTI last wee as family noted urine to be very malodorous and patient did c/o pain with urination and grabbing self when urinating. Initially on cipro, but changed to nitrofurantoin. Family notes patient has been worse since starting nitrofurantoin. Found this am with dried emesis on clothing. (Dr Chilo Villanueva)     1/9: Not oriented at all. Looks in no distress. Nurses report no N/V overnight. K+ low at 3.2 - replacing. 1/10:  More alert today and able to speak in sentences - content often poor. She is able to say \"want to go home. \"  No further N/V. AM labs pending. Cont Rocephin for now.     1/11: She remains much more alert. She does not know her location/day/date but will follow one and occas 2 step commands. Better po intake. K+ low again at 2.8 - replace po and IV. Much more alert since DC of benzos. 1/12:  Remains much more alert. When asked how she is doing she is able to respond \"I think I'm doing better\" and able to respond \"I don't hurt anywhere right now\" when asked if she has any pain. Still does not know day/date/location or remember my name. Family trying to get LTC placement.   Family wanted Lorazepam restarted - discussed care with grand daughter yesterday and educated on why benzos should not be generally or routinely used in elderly, demented patients unless other things have been tried. Creat a little higher - watch. K+ still low - replace more IV and po. Add 2pm accepted at [facility] - stable for discharge to NH - monitor K+ at [facility]. Review of Systems:   Review of systems not obtained due to patient factors. Objective:   Physical Exam:     Visit Vitals    /67 (BP 1 Location: Right arm, BP Patient Position: At rest)    Pulse 83    Temp 98.1 °F (36.7 °C)    Resp 18    Ht 5' (1.524 m)    Wt 43.1 kg (95 lb)    SpO2 95%    BMI 18.55 kg/m2      O2 Device: Room air    Temp (24hrs), Av.2 °F (36.8 °C), Min:97.8 °F (36.6 °C), Max:98.6 °F (37 °C)    1901 -  0700  In: -   Out: 400 [Urine:400]   01/10 0701 - 1900  In: 8665 [P.O.:240; I.V.:5030]  Out: 950 [Urine:950]    General:   no distress, appears stated age; thin   Head:  Normocephalic, without obvious abnormality, atraumatic. Eyes:  Conjunctivae/corneas clear. EOMs intact. Nose: Nares normal. Septum midline. Mucosa normal. No drainage or sinus tenderness. Throat: Lips, mucosa, and tongue moist..   Neck: Supple, symmetrical, trachea midline, no adenopathy, no carotid bruit and no JVD. Back:    No CVA tenderness. Lungs:   Clear to auscultation bilaterally. Chest wall:  No tenderness or deformity. Heart:  Regular rate and rhythm, S1, S2 normal, no murmur, click, rub or gallop. Abdomen:   Soft, apparently non-tender. Bowel sounds normal. No masses,  No organomegaly. Extremities: no cyanosis or edema. No calf tenderness or cords. Skin:  turgor fairl. Chronic ulcer right hip looks unchangedor lesions   Neurologic:  Alert and oriented X 0. She is more alert today. No cogwheeling or rigidity. Gait not tested at this time. Sensation grossly normal to touch.   Gross motor of extremities normal.       Data Review:    AB CT 1/8/18: INDICATION: Vomiting, abdominal pain. Nausea.   Exam: Noncontrast CT of the abdomen and pelvis is performed with 5 mm  collimation. Sagittal and coronal reformatted images were also performed.   FINDINGS:  There is mild bibasilar atelectasis. Abdomen:   Liver: The liver is normal on noncontrast images. Spleen: The spleen is normal on noncontrast images. Adrenals: The adrenals are normal on noncontrast images. Pancreas: Pancreas is atrophic in appearance. Gallbladder: The gallbladder is normal on noncontrast images. Kidneys: There is no perinephric stranding, hydronephrosis or hydroureter. No  renal, ureteral bladder calculus is visualized. Bowel: No thickened or dilated loop of large or small bowel seen. Abdominal aorta: Extensive atherosclerotic calcifications are noted within the  abdominal aorta. There is 2.7 cm fusiform ectasia of the distal abdominal aorta,  measuring 2.4 cm on prior CT dated December 2014. Pelvis: Urinary bladder is partially filled and grossly normal.  Bones: There is an age indeterminate moderate L3 compression deformity, new  compared to prior CT dated December 2014. Osseous structures are diffusely  demineralized. Miscellaneous: Within the left hemipelvis, there is a 1.6 cm x 4.9 cm  thin-walled collection representing residual seroma related to prior hemorrhage  seen on prior CT dated December 2014. There is no free intraperitoneal gas or  fluid. There is a beam hardening artifact within the pelvis secondary to right  hip prosthesis. IMPRESSION:   1. No bowel obstruction, ileus or perforation. 2. Age indeterminant moderate L3 compression deformity.     Recent Days:  Recent Labs      01/12/18   0338  01/11/18   0549   WBC  9.0  8.3   HGB  11.7  11.1*   HCT  34.9*  33.0*   PLT  213  201     Recent Labs      01/12/18   0338  01/11/18   0549   NA  139  140   K  3.2*  2.8*   CL  107  107   CO2  23  23   GLU  86  78   BUN  16  17   CREA  1.90*  1.86* CA  8.4*  8.5   ALB  2.7*  2.5*   TBILI  0.2  0.2   SGOT  12*  12*   ALT  6*  8*     No results for input(s): PH, PCO2, PO2, HCO3, FIO2 in the last 72 hours. 24 Hour Results:  Recent Results (from the past 24 hour(s))   CBC WITH AUTOMATED DIFF    Collection Time: 01/12/18  3:38 AM   Result Value Ref Range    WBC 9.0 3.6 - 11.0 K/uL    RBC 4.21 3.80 - 5.20 M/uL    HGB 11.7 11.5 - 16.0 g/dL    HCT 34.9 (L) 35.0 - 47.0 %    MCV 82.9 80.0 - 99.0 FL    MCH 27.8 26.0 - 34.0 PG    MCHC 33.5 30.0 - 36.5 g/dL    RDW 15.1 (H) 11.5 - 14.5 %    PLATELET 871 565 - 126 K/uL    NEUTROPHILS PENDING %    LYMPHOCYTES PENDING %    MONOCYTES PENDING %    EOSINOPHILS PENDING %    BASOPHILS PENDING %    ABS. NEUTROPHILS PENDING K/UL    ABS. LYMPHOCYTES PENDING K/UL    ABS. MONOCYTES PENDING K/UL    ABS. EOSINOPHILS PENDING K/UL    ABS. BASOPHILS PENDING K/UL    DF PENDING    METABOLIC PANEL, COMPREHENSIVE    Collection Time: 01/12/18  3:38 AM   Result Value Ref Range    Sodium 139 136 - 145 mmol/L    Potassium 3.2 (L) 3.5 - 5.1 mmol/L    Chloride 107 97 - 108 mmol/L    CO2 23 21 - 32 mmol/L    Anion gap 9 5 - 15 mmol/L    Glucose 86 65 - 100 mg/dL    BUN 16 6 - 20 MG/DL    Creatinine 1.90 (H) 0.55 - 1.02 MG/DL    BUN/Creatinine ratio 8 (L) 12 - 20      GFR est AA 30 (L) >60 ml/min/1.73m2    GFR est non-AA 25 (L) >60 ml/min/1.73m2    Calcium 8.4 (L) 8.5 - 10.1 MG/DL    Bilirubin, total 0.2 0.2 - 1.0 MG/DL    ALT (SGPT) 6 (L) 12 - 78 U/L    AST (SGOT) 12 (L) 15 - 37 U/L    Alk.  phosphatase 68 45 - 117 U/L    Protein, total 5.5 (L) 6.4 - 8.2 g/dL    Albumin 2.7 (L) 3.5 - 5.0 g/dL    Globulin 2.8 2.0 - 4.0 g/dL    A-G Ratio 1.0 (L) 1.1 - 2.2         Medications reviewed  Current Facility-Administered Medications   Medication Dose Route Frequency    potassium chloride SR (KLOR-CON 10) tablet 10 mEq  10 mEq Oral BID    ondansetron (ZOFRAN) injection 4 mg  4 mg IntraVENous Q6H PRN    traZODone (DESYREL) tablet 25 mg  25 mg Oral QHS    sodium chloride (NS) flush 5-10 mL  5-10 mL IntraVENous Q8H    sodium chloride (NS) flush 5-10 mL  5-10 mL IntraVENous PRN    heparin (porcine) injection 5,000 Units  5,000 Units SubCUTAneous Q8H    buPROPion XL (WELLBUTRIN XL) tablet 300 mg  300 mg Oral DAILY    venlafaxine-SR (EFFEXOR-XR) capsule 225 mg  225 mg Oral DAILY    levothyroxine (SYNTHROID) tablet 50 mcg  50 mcg Oral ACB    cefTRIAXone (ROCEPHIN) 1 g in 0.9% sodium chloride (MBP/ADV) 50 mL  1 g IntraVENous Q24H    polyethylene glycol (MIRALAX) packet 17 g  17 g Oral BID    senna-docusate (PERICOLACE) 8.6-50 mg per tablet 1 Tab  1 Tab Oral BID    aspirin chewable tablet 81 mg  81 mg Oral DAILY       Care Plan discussed with: Patient and Nurse  Total time spent with patient: 30 minutes.   Deya Spicer MD

## 2018-01-12 NOTE — PROGRESS NOTES
RN in to see pt for pm med. Pt is sitting up, dry heaving, and nauseous. Called Dr. Molly Vargas, informed of dry heaves and no nausea medication is ordered. New orders received to give Zofran 4 mg iv now and then Zofran 4 mg iv q6hrs prn.

## 2018-01-12 NOTE — PROGRESS NOTES
Palliative Medicine Consult    Patient Name: Maria Fernanda Guillory  YOB: 1932    Date of Initial Consult: 1/11/18  Reason for Consult: Care decisions  Requesting Provider: Dr. Shelly Marie   Primary Care Physician: Juan Luis Finch MD      SUMMARY:   Darin Rose is a 80 y.o. with a past history of advanced dementia, CKD, Hypothyroidism  who was admitted on 1/8/2018 from home with a diagnosis of FTT/UTI Current medical issues leading to Palliative Medicine involvement include: Care decisions    Chart reviewed/HPI-patient admitted with overall FTT/UTI. Patient with advanced dementia and per the notes, patient with decline, especially worse over the last 2-3 weeks. Patient with possible UTI and currently being treated    SH-more information provided by granddaughter. Patient lives with her for the last 8 years. Has a suite added to the house for her. Patient has had significant decline since a fall in July. Patient has become dependent on all ADLs and has had a weight loss of almost 60+ pounds over the last 6-12 months. Patient with 3 sons who live locally. One son lives in [place] with advanced sarcoidosis. They are hoping for patient to go to [place] as well with medicaid bed. PALLIATIVE DIAGNOSES:   1. GOC discussion  2. Debility  3. Advanced dementia, FAST score appears to be 7C(inability to ambulate, dependent on all ADLs) and with the weight loss, she would meet criteria for Hospice care. 4. FTT  5. Malnutrition-albumin of 2.5       PLAN:   1. Met with granddaughter who is secondary MPOA. Her mom(patient's daughter in [address] [place] [address]) is primary MPOA. Reviewed the current medical issues with her and Maria Fernanda able to provide a detailed history of patient's decline over the last 6 months. 2. GOC-Maria Fernanda is clear that she wants to be sure her grandmother is comfortable and not suffering.  Their family has had many discussions over the last several months and understand patient is declining and may have only months to live, especially if her poor appetite and weight loss continues. We reviewed the natural progression of dementia and given her description, her grandmother likely in the more advanced stages. Described Hospice care and what support they may provide at [facility] and she interested in the additional support for patient. Reviewed what Hospice can provide-nurse, aides, SW team to check on her. Typically, patient do not return to the ED for further testing under Hospice care. She remains interested  3. Discussed with Alexi) who will talk with Simon to see if they have a preference on Hospice agency and we can place a referral. Will also talk with Dr. Silvia Farnsworth   4. AMD-in chart and reviewed. No changes  5. Code status-discussion with family at admission states DNAR/DNI. DDNR signed today  6. Symptom management-no acute symptoms for our team to manage tomorrow  7. Psychosocial-lots of family support. They are Christians and their alla does provide support to the family. 8. Initial consult note routed to primary continuity provider  9.  Communicated plan of care with: Palliative IDT       GOALS OF CARE / TREATMENT PREFERENCES:   [====Goals of Care====]  GOALS OF CARE:  Patient/Health Care Proxy Stated Goals: Comfort      TREATMENT PREFERENCES:   Code Status: DNR    Advance Care Planning:  Advance Care Planning 1/8/2018   Patient's Healthcare Decision Maker is: Legal Next of Ellie 69   Primary Decision Maker Name Scotty Thomas   Primary Decision Maker Phone Number [phone]   Primary Decision Maker Relationship to Patient Other relative   Secondary Decision Maker Name -   Secondary Decision Kaitlin Crump Phone Number -   Secondary Decision Maker Relationship to Patient -   Confirm Advance Directive Yes, not on file   Does the patient have other document types -       Medical Interventions: Comfort measures  Other Instructions:   Artificially Administered Nutrition: No feeding tube      The palliative care team has discussed with patient / health care proxy about goals of care / treatment preferences for patient.  [====Goals of Care====]         HISTORY:     History obtained from: chart, bedside nurse, Maria Fernanda Mcfarland    CHIEF COMPLAINT: worsening confusion    HPI/SUBJECTIVE:    The patient is:   [x] Verbal and participatory  [] Non-participatory due to:   Patient remains very verbal but only eating bites of food at best.     Clinical Pain Assessment (nonverbal scale for severity on nonverbal patients):   Clinical Pain Assessment  Severity: 0    Adult Nonverbal Pain Scale  Face: No particular expression or smile  Activity (Movement): Lying quietly, normal position  Guarding: Lying quietly, no positioning of hands over areas of body  Physiology (Vital Signs): Stable vital signs  Respiratory: Baseline RR/SpO2 compliant with ventilator  Total Score: 0       FUNCTIONAL ASSESSMENT:     Palliative Performance Scale (PPS):  PPS: 40       PSYCHOSOCIAL/SPIRITUAL SCREENING:     Advance Care Planning:  Advance Care Planning 1/8/2018   Patient's Healthcare Decision Maker is: Legal Next of Ellie 69   Primary Decision Maker Name Jaxon Espitia   Primary Decision Maker Phone Number [phone]   Primary Decision Maker Relationship to Patient Other relative   Secondary Decision Maker Name -   Secondary Decision [address] Phone Number -   Secondary Decision Maker Relationship to Patient -   Confirm Advance Directive Yes, not on file   Does the patient have other document types -        Any spiritual / Adventist concerns:  [] Yes /  [x] No    Caregiver Burnout:  [] Yes /  [] No /  [x] No Caregiver Present      Anticipatory grief assessment:   [x] Normal  / [] Maladaptive       ESAS Anxiety:    Not able to answer but appears anxious and guarded  ESAS Depression:   not able to assess with her dementia       REVIEW OF SYSTEMS:     Positive and pertinent negative findings in ROS are noted above in HPI.   The following systems were [] reviewed / [x] unable to be reviewed as noted in HPI  Other findings are noted below. Systems: constitutional, ears/nose/mouth/throat, respiratory, gastrointestinal, genitourinary, musculoskeletal, integumentary, neurologic, psychiatric, endocrine. Positive findings noted below. Modified ESAS Completed by: provider   Fatigue: 2 Drowsiness: 0     Pain: 0     Nausea: 3   Anorexia: 2 Dyspnea: 0     Constipation: No              PHYSICAL EXAM:     From RN flowsheet:  Wt Readings from Last 3 Encounters:   01/08/18 95 lb (43.1 kg)   12/14/17 113 lb (51.3 kg)   07/17/17 155 lb (70.3 kg)     Blood pressure 124/70, pulse 81, temperature 98.4 °F (36.9 °C), resp. rate 16, height 5' (1.524 m), weight 95 lb (43.1 kg), SpO2 99 %.     Pain Scale 1: Numeric (0 - 10)  Pain Intensity 1: 0                 Last bowel movement, if known:     Constitutional: interactive, alert to person only  Eyes: pupils equal, anicteric  ENMT: no nasal discharge, moist mucous membranes  Cardiovascular: regular rhythm, distal pulses intact  Respiratory: breathing not labored, symmetric  Gastrointestinal: soft non-tender, +bowel sounds  Musculoskeletal: no deformity, no tenderness to palpation  Skin: warm, dry  Neurologic: following commands, moving all extremities  Psychiatric: confused, following some commands  Other:       HISTORY:     Principal Problem:    Nausea and vomiting (1/8/2018)    Active Problems:    HTN (hypertension) (10/19/2014)      Hypothyroidism (10/19/2014)      UTI (urinary tract infection) (7/2/2017)      Failure to thrive in adult (1/8/2018)      CKD (chronic kidney disease) stage 4, GFR 15-29 ml/min ([facility] [address] (1/8/2018)      Dementia (1/8/2018)      Past Medical History:   Diagnosis Date    Arthritis     Asthma     Autoimmune hemolytic anemia (HCC)     Depression     Heart murmur     Hypertension     Hypothyroidism     Pulmonary embolism (HCC)     Thromboembolus (HCC)     Vertigo       Past Surgical History:   Procedure Laterality Date  HX COLONOSCOPY      HX HEENT      HX HYSTERECTOMY      HX ORTHOPAEDIC  R total hip    HX REFRACTIVE SURGERY        Family History   Problem Relation Age of Onset    Family history unknown: Yes      History reviewed, no pertinent family history.   Social History   Substance Use Topics    Smoking status: Never Smoker    Smokeless tobacco: Never Used    Alcohol use No     Allergies   Allergen Reactions    Percocet [Oxycodone-Acetaminophen] Anxiety     Anxiety, shaking     Pcn [Penicillins] Rash     Patient states this happened ~ 40 years ago  Tolerates ceftriaxone    Sulfa (Sulfonamide Antibiotics) Itching and Vertigo      Current Facility-Administered Medications   Medication Dose Route Frequency    potassium chloride SR (KLOR-CON 10) tablet 10 mEq  10 mEq Oral BID    ondansetron (ZOFRAN) injection 4 mg  4 mg IntraVENous Q6H PRN    traZODone (DESYREL) tablet 25 mg  25 mg Oral QHS    sodium chloride (NS) flush 5-10 mL  5-10 mL IntraVENous Q8H    sodium chloride (NS) flush 5-10 mL  5-10 mL IntraVENous PRN    heparin (porcine) injection 5,000 Units  5,000 Units SubCUTAneous Q8H    buPROPion XL (WELLBUTRIN XL) tablet 300 mg  300 mg Oral DAILY    venlafaxine-SR (EFFEXOR-XR) capsule 225 mg  225 mg Oral DAILY    levothyroxine (SYNTHROID) tablet 50 mcg  50 mcg Oral ACB    cefTRIAXone (ROCEPHIN) 1 g in 0.9% sodium chloride (MBP/ADV) 50 mL  1 g IntraVENous Q24H    polyethylene glycol (MIRALAX) packet 17 g  17 g Oral BID    senna-docusate (PERICOLACE) 8.6-50 mg per tablet 1 Tab  1 Tab Oral BID    aspirin chewable tablet 81 mg  81 mg Oral DAILY          LAB AND IMAGING FINDINGS:     Lab Results   Component Value Date/Time    WBC 9.0 01/12/2018 03:38 AM    HGB 11.7 01/12/2018 03:38 AM    PLATELET 461 62/48/0025 03:38 AM     Lab Results   Component Value Date/Time    Sodium 139 01/12/2018 03:38 AM    Potassium 3.2 01/12/2018 03:38 AM    Chloride 107 01/12/2018 03:38 AM    CO2 23 01/12/2018 03:38 AM    BUN 16 01/12/2018 03:38 AM    Creatinine 1.90 01/12/2018 03:38 AM    Calcium 8.4 01/12/2018 03:38 AM    Magnesium 2.3 01/09/2018 01:29 AM    Phosphorus 4.4 12/23/2014 08:32 PM      Lab Results   Component Value Date/Time    AST (SGOT) 12 01/12/2018 03:38 AM    Alk. phosphatase 68 01/12/2018 03:38 AM    Protein, total 5.5 01/12/2018 03:38 AM    Albumin 2.7 01/12/2018 03:38 AM    Globulin 2.8 01/12/2018 03:38 AM     Lab Results   Component Value Date/Time    INR 1.0 09/16/2016 12:27 PM    Prothrombin time 9.9 09/16/2016 12:27 PM    aPTT >130.0 12/12/2014 05:15 AM      Lab Results   Component Value Date/Time    Iron 219 10/19/2014 03:00 AM    TIBC 243 10/19/2014 03:00 AM    Iron % saturation 90 10/19/2014 03:00 AM    Ferritin 654 12/11/2014 04:55 PM      Lab Results   Component Value Date/Time    pH 7.38 12/21/2014 03:45 PM    PCO2 35 12/21/2014 03:45 PM    PO2 69 12/21/2014 03:45 PM     No components found for: [place]   Lab Results   Component Value Date/Time    CK 50 07/02/2017 12:58 AM    CK - MB <1.0 07/02/2017 12:58 AM                Total time: 35  Counseling / coordination time, spent as noted above: 25  > 50% counseling / coordination?: yes    Prolonged service was provided for  []30 min   []75 min in face to face time in the presence of the patient, spent as noted above. Time Start:   Time End:   Note: this can only be billed with 75996 (initial) or 98562 (follow up). If multiple start / stop times, list each separately.

## 2018-01-12 NOTE — PROGRESS NOTES
Instructions reviewed with family. Time for questions and clarification was provided. PIV removed from patient and tip intact. Patient in no current distress and awaiting ambulance transport. Report called to Alize Ramos RN at [facility].

## 2018-01-14 ENCOUNTER — HOSPITAL ENCOUNTER (EMERGENCY)
Age: 83
Discharge: NURSING, REHAB OR LONG-TERM CARE | End: 2018-01-14
Attending: EMERGENCY MEDICINE
Payer: MEDICARE

## 2018-01-14 ENCOUNTER — APPOINTMENT (OUTPATIENT)
Dept: GENERAL RADIOLOGY | Age: 83
End: 2018-01-14
Attending: PHYSICIAN ASSISTANT
Payer: MEDICARE

## 2018-01-14 VITALS
OXYGEN SATURATION: 93 % | DIASTOLIC BLOOD PRESSURE: 91 MMHG | HEART RATE: 80 BPM | TEMPERATURE: 97.6 F | HEIGHT: 60 IN | SYSTOLIC BLOOD PRESSURE: 129 MMHG | RESPIRATION RATE: 13 BRPM | WEIGHT: 140 LBS | BODY MASS INDEX: 27.48 KG/M2

## 2018-01-14 DIAGNOSIS — S73.004A DISLOCATION OF RIGHT HIP, INITIAL ENCOUNTER (HCC): Primary | ICD-10-CM

## 2018-01-14 PROCEDURE — 74011250636 HC RX REV CODE- 250/636: Performed by: PHYSICIAN ASSISTANT

## 2018-01-14 PROCEDURE — 73502 X-RAY EXAM HIP UNI 2-3 VIEWS: CPT

## 2018-01-14 PROCEDURE — 99285 EMERGENCY DEPT VISIT HI MDM: CPT

## 2018-01-14 PROCEDURE — L1830 KO IMMOB CANVAS LONG PRE OTS: HCPCS

## 2018-01-14 PROCEDURE — 75810000301 HC ER LEVEL 1 CLOSED TREATMNT FRACTURE/DISLOCATION

## 2018-01-14 RX ORDER — MORPHINE SULFATE 4 MG/ML
4 INJECTION INTRAVENOUS
Status: DISCONTINUED | OUTPATIENT
Start: 2018-01-14 | End: 2018-01-14 | Stop reason: HOSPADM

## 2018-01-14 RX ORDER — SODIUM CHLORIDE 9 MG/ML
150 INJECTION, SOLUTION INTRAVENOUS CONTINUOUS
Status: DISCONTINUED | OUTPATIENT
Start: 2018-01-14 | End: 2018-01-14 | Stop reason: HOSPADM

## 2018-01-14 RX ORDER — PROPOFOL 10 MG/ML
200 INJECTION, EMULSION INTRAVENOUS
Status: COMPLETED | OUTPATIENT
Start: 2018-01-14 | End: 2018-01-14

## 2018-01-14 RX ORDER — ONDANSETRON 2 MG/ML
4 INJECTION INTRAMUSCULAR; INTRAVENOUS
Status: DISCONTINUED | OUTPATIENT
Start: 2018-01-14 | End: 2018-01-14 | Stop reason: HOSPADM

## 2018-01-14 RX ADMIN — PROPOFOL 200 MG: 10 INJECTION, EMULSION INTRAVENOUS at 16:48

## 2018-01-14 NOTE — ED TRIAGE NOTES
Complains of right hip pain following fall yesterday. The right leg is internally rotated at this time.

## 2018-01-14 NOTE — DISCHARGE INSTRUCTIONS
Dislocated Hip: Care Instructions  Your Care Instructions    Your hip is a large and fairly stable joint. Normally it takes a hard fall, a car accident, or something else of great force to make the thighbone slip out of its socket (dislocate). But if you have had hip replacement surgery, your hip can more easily slip out of position. This is more common during the first few months after the surgery. After your doctor puts your dislocated hip back into normal position, you will need to use a walking aid or hip brace for several weeks or months while the hip heals. You will need to follow special hip precautions to avoid dislocating your hip again. Your doctor may recommend exercises to strengthen the hip joint and your legs. Rest and home treatment can help you heal.  If your hip becomes dislocated again, contact your doctor. You will need to go to a hospital or clinic to have your hip put back in position. You may have had a sedative to help you relax. You may be unsteady after having sedation. It can take a few hours for the medicine's effects to wear off. Common side effects of sedation include nausea, vomiting, and feeling sleepy or tired. The doctor has checked you carefully, but problems can develop later. If you notice any problems or new symptoms, get medical treatment right away. Follow-up care is a key part of your treatment and safety. Be sure to make and go to all appointments, and call your doctor if you are having problems. It's also a good idea to know your test results and keep a list of the medicines you take. How can you care for yourself at home? · If the doctor gave you a sedative:  ¨ For 24 hours, don't do anything that requires attention to detail. It takes time for the medicine's effects to completely wear off. ¨ For your safety, do not drive or operate any machinery that could be dangerous. Wait until the medicine wears off and you can think clearly and react easily.   · Your doctor will give you safety precautions to keep your hip centered in its socket during the healing period. Be sure to follow these precautions. ¨ Keep your knees and toes pointed forward when you sit in a chair, walk, or stand. ¨ Do not sit with your legs crossed. ¨ Do not bend at the waist more than 90º. Be careful when leaning or when moving in bed to keep your legs as straight ahead as possible. · If you have a hip brace, wear it as directed. Do not remove it unless your doctor says you can. If you remove the brace to shower, be extremely careful. Follow hip precautions to limit hip movement. · Rest your hip as much as you can. You will need to change your activities to avoid movements that irritate the hip. · If your hip is swollen, put ice or a cold pack on it for 10 to 20 minutes at a time. Try to do this every 1 to 2 hours for the next 3 days (when you are awake) or until the swelling goes down. Put a thin cloth between the ice and your skin. · Take your medicines exactly as prescribed. Call your doctor if you think you are having a problem with your medicine. · Ask your doctor if you can take an over-the-counter pain medicine, such as acetaminophen (Tylenol), ibuprofen (Advil, Motrin), or naproxen (Aleve). Be safe with medicines. Read and follow all instructions on the label. · If your doctor recommends exercises, do them as directed. When should you call for help? Call 911 anytime you think you may need emergency care. For example, call if:  ? · You have chest pain, are short of breath, or cough up blood. ? · You have trouble breathing. ? · You passed out (lost consciousness). ?Call your doctor now or seek immediate medical care if:  ? · You have new or worse nausea or vomiting. ? · You have new or worse pain. ? · Your foot is cool or pale or changes color. ? · You have tingling, weakness, or numbness in your foot or toes.    ? · You have signs of a blood clot in your leg (called a deep vein thrombosis), such as:  ¨ Pain in your calf, back of the knee, thigh, or groin. ¨ Redness or swelling in your leg. ? Watch closely for changes in your health, and be sure to contact your doctor if:  ? · You do not get better as expected. Where can you learn more? Go to http://bernardino-stanley.info/. Enter S771 in the search box to learn more about \"Dislocated Hip: Care Instructions. \"  Current as of: March 21, 2017  Content Version: 11.4  © 7553-5953 [employer]. Care instructions adapted under license by [employer] (which disclaims liability or warranty for this information). If you have questions about a medical condition or this instruction, always ask your healthcare professional. [address] any warranty or liability for your use of this information. We hope that we have addressed all of your medical concerns. The examination and treatment you received in the Emergency Department were for an emergent problem and were not intended as complete care. It is important that you follow up with your healthcare provider(s) for ongoing care. If your symptoms worsen or do not improve as expected, and you are unable to reach your usual health care provider(s), you should return to the Emergency Department. Today's healthcare is undergoing tremendous change, and patient satisfaction surveys are one of the many tools to assess the quality of medical care. You may receive a survey from the [employer] regarding your experience in the Emergency Department. I hope that your experience has been completely positive, particularly the medical care that I provided. As such, please participate in the survey; anything less than excellent does not meet my expectations or intentions. [address] [facility] and [address] [place] [address] participate in nationally recognized quality of care measures.   If your blood pressure is greater than 120/80, as reported below, we urge that you seek medical care to address the potential of high blood pressure, commonly known as hypertension. Hypertension can be hereditary or can be caused by certain medical conditions, pain, stress, or \"white coat syndrome. \"       Please make an appointment with your health care provider(s) for follow up of your Emergency Department visit. VITALS:   Patient Vitals for the past 8 hrs:   Temp Pulse Resp BP SpO2   01/14/18 1732 - 75 20 116/58 97 %   01/14/18 1720 - 80 14 147/76 100 %   01/14/18 1718 - 72 12 112/60 100 %   01/14/18 1710 97.6 °F (36.4 °C) 88 20 135/76 97 %   01/14/18 1625 - - - - 100 %   01/14/18 1612 97.6 °F (36.4 °C) 87 18 139/85 100 %          Thank you for allowing us to provide you with medical care today. We realize that you have many choices for your emergency care needs. Please choose us in the future for any continued health care needs. Cali Diaz, [address]: [phone]            No results found for this or any previous visit (from the past 24 hour(s)). Xr Hip Rt W Or Wo Pelv 2-3 Vws    Result Date: 1/14/2018  EXAM:  XR HIP RT W OR WO PELV 2-3 VWS INDICATION:   Right hip prosthesis dislocation status post reduction. COMPARISON: 1/14/2018. FINDINGS: 2 right hip views demonstrate satisfactory alignment of the right hip prosthesis. There is no acute fracture. IMPRESSION:  Satisfactory right hip alignment. INTERPRETATION PROVIDED FOR COMPLIANCE ONLY AT NO CHARGE . Xr Hip Rt W Or Wo Pelv 2-3 Vws    Result Date: 1/14/2018  EXAM:  XR HIP RT W OR WO PELV 2-3 VWS Coronal history: Hip pain after fall. INDICATION:   hip pain, fall. COMPARISON: 1/8/2028. FINDINGS: An AP view of the pelvis and a frogleg lateral view of the right hip demonstrate dislocation of the femoral arthroplasty component in relationship to the acetabular component. . Chronic deformities of the right pubic rami. Osteopenia. IMPRESSION:  Posterior lateral hip dislocation. Sarah Gar

## 2018-01-14 NOTE — ED PROVIDER NOTES
HPI Comments: Emely Chin is a 80 y.o. female  who presents by EMS to ER with c/o Patient presents with:  Hip Pain: Right hip pain. Patient lives at [facility]. Per EMS patient had fall yesterday. Complaining of right hit pain today. Denies reports of head injury or LOC. Per EMS patient was ambulatory after fall. Patient had recent hip replacement. Patient with history of dementia and is poor historian. She specifically denies any fevers, chills, nausea, vomiting, chest pain, shortness of breath, headache, rash, diarrhea, abdominal pain, urinary/bowel changes, sweating or weight loss. PCP: Bob Ashley MD   PMHx significant for: Past Medical History:  No date: Arthritis  No date: Asthma  No date: Autoimmune hemolytic anemia ([facility] [address]  No date: Depression  No date: Heart murmur  No date: Hypertension  No date: Hypothyroidism  No date: Pulmonary embolism (HCC)  No date: Thromboembolus ([facility] [address]  No date: Vertigo   PSHx significant for: Past Surgical History:  No date: HX COLONOSCOPY  No date: HX HEENT  No date: HX HYSTERECTOMY  R total hip: HX ORTHOPAEDIC  No date: HX REFRACTIVE SURGERY  Social Hx: Tobacco use: Smoking status: Never Smoker                                                              Smokeless status: Never Used                      ; EtOH use: The patient states she drinks 0 per week.; Illicit Drug use: Allergies:   -- Percocet (Oxycodone-Acetaminophen) -- Anxiety    --  Anxiety, shaking   -- Pcn (Penicillins) -- Rash    --  Patient states this happened ~ 40 years ago             Tolerates ceftriaxone   -- Sulfa (Sulfonamide Antibiotics) -- Itching and Vertigo    There are no other complaints, changes or physical findings at this time. Patient is a 80 y.o. female presenting with hip pain. The history is provided by the patient. Hip Injury    This is a new problem. The current episode started yesterday. The problem occurs constantly.  The problem has not changed since onset. The pain is present in the right hip. The quality of the pain is described as aching. The pain is at a severity of 8/10. The pain is moderate. Pertinent negatives include no numbness, full range of motion, no stiffness, no tingling, no itching, no back pain and no neck pain. The symptoms are aggravated by palpation. She has tried nothing for the symptoms. There has been no history of extremity trauma. Past Medical History:   Diagnosis Date    Arthritis     Asthma     Autoimmune hemolytic anemia (HCC)     Depression     Heart murmur     Hypertension     Hypothyroidism     Pulmonary embolism (HCC)     Thromboembolus (HCC)     Vertigo        Past Surgical History:   Procedure Laterality Date    HX COLONOSCOPY      HX HEENT      HX HYSTERECTOMY      HX ORTHOPAEDIC  R total hip    HX REFRACTIVE SURGERY           Family History:   Problem Relation Age of Onset    Family history unknown: Yes       Social History     Social History    Marital status:      Spouse name: N/A    Number of children: N/A    Years of education: N/A     Occupational History    Not on file. Social History Main Topics    Smoking status: Never Smoker    Smokeless tobacco: Never Used    Alcohol use No    Drug use: No    Sexual activity: Not on file     Other Topics Concern    Not on file     Social History Narrative         ALLERGIES: Percocet [oxycodone-acetaminophen]; Pcn [penicillins]; and Sulfa (sulfonamide antibiotics)    Review of Systems   Constitutional: Negative. HENT: Negative. Eyes: Negative. Respiratory: Negative. Cardiovascular: Negative. Gastrointestinal: Negative. Endocrine: Negative. Genitourinary: Negative. Musculoskeletal: Positive for arthralgias. Negative for back pain, neck pain and stiffness. Skin: Negative. Negative for itching. Allergic/Immunologic: Negative. Neurological: Negative. Negative for tingling and numbness.    Hematological: Negative. Psychiatric/Behavioral: Negative. All other systems reviewed and are negative. Vitals:    01/14/18 1612   BP: 139/85   Pulse: 87   Resp: 18   Temp: 97.6 °F (36.4 °C)   SpO2: 100%   Weight: 63.5 kg (140 lb)   Height: 5' (1.524 m)            Physical Exam   Constitutional: She is oriented to person, place, and time. She appears well-developed. HENT:   Head: Normocephalic and atraumatic. Right Ear: External ear normal.   Left Ear: External ear normal.   Nose: Nose normal.   Mouth/Throat: Oropharynx is clear and moist. No oropharyngeal exudate. Eyes: Conjunctivae, EOM and lids are normal. Right eye exhibits no discharge. Left eye exhibits no discharge. Neck: Normal range of motion. No tracheal deviation present. No thyromegaly present. Cardiovascular: Normal rate, regular rhythm, normal heart sounds and intact distal pulses. Pulmonary/Chest: Effort normal and breath sounds normal.   Abdominal: Soft. Normal appearance and bowel sounds are normal.   Musculoskeletal: Normal range of motion. Legs:  Neurological: She is alert and oriented to person, place, and time. Skin: Skin is warm and dry. Psychiatric: She has a normal mood and affect. Judgment normal.        MDM  Number of Diagnoses or Management Options  Dislocation of right hip, initial encounter [facility]):   Diagnosis management comments: Assesment/Plan- 80 y.o. Patient presents with:  Hip Pain: Right hip pain  differential includes: hip fracture, prosthetic hip dislocation, contusion. imaging reviewed with prosthetic right hip dislocation. Patient seen by Ney DEWITT and hip reduced under conscious sedation. Patient placed in knee immobilizer and discharged back to facility. Recommend ORtho follow up. Patient educated on reasons to return to the ED.          Amount and/or Complexity of Data Reviewed  Tests in the radiology section of CPT®: ordered and reviewed  Discuss the patient with other providers: yes (Attending- Dr. James Patino who also saw patient and agrees with plan. )      ED Course       Procedural Sedation  Date/Time: 1/14/2018 5:15 PM  Performed by: Tahmina Eric by: Peggy Franco     Consent:     Consent obtained:  Written    Consent given by:  Guardian    Risks discussed:   Allergic reaction, dysrhythmia, inadequate sedation, nausea, prolonged hypoxia resulting in organ damage, prolonged sedation necessitating reversal, respiratory compromise necessitating ventilatory assistance and intubation and vomiting    Alternatives discussed:  Analgesia without sedation  Indications:     Procedure performed:  Dislocation reduction    Procedure necessitating sedation performed by:  Different physician (ortho pa)    Intended level of sedation:  Moderate (conscious sedation)  Pre-sedation assessment:     Time since last food or drink:  2 hours    ASA classification: class 3 - patient with severe systemic disease      Neck mobility: normal      Mouth opening:  3 or more finger widths    Thyromental distance:  4 finger widths    Mallampati score:  I - soft palate, uvula, fauces, pillars visible    Pre-sedation assessments completed and reviewed: airway patency, cardiovascular function, hydration status, mental status, nausea/vomiting, pain level, respiratory function and temperature      History of difficult intubation: no      Pre-sedation assessment completed:  1/14/2018 5:00 PM  Immediate pre-procedure details:     Reassessment: Patient reassessed immediately prior to procedure      Reviewed: vital signs and relevant labs/tests      Verified: bag valve mask available, emergency equipment available, intubation equipment available, IV patency confirmed, oxygen available, reversal medications available and suction available    Procedure details (see MAR for exact dosages):     Sedation start time:  1/14/2018 5:15 PM    Preoxygenation:  Nasal cannula    Sedation:  Propofol    Intra-procedure monitoring:  Blood pressure monitoring, cardiac monitor, continuous capnometry, continuous pulse oximetry, frequent LOC assessments and frequent vital sign checks    Intra-procedure events: none      Sedation end time:  1/14/2018 5:19 PM    Total sedation time (minutes):  5  Post-procedure details:     Post-sedation assessment completed:  1/14/2018 6:00 PM    Attendance: Constant attendance by certified staff until patient recovered      Recovery: Patient returned to pre-procedure baseline      Post-sedation assessments completed and reviewed: airway patency, cardiovascular function, hydration status, mental status, nausea/vomiting, pain level, respiratory function and temperature      Patient is stable for discharge or admission: yes      Patient tolerance: Tolerated well, no immediate complications                         CONSULT NOTE:   4:56 PM  Silvestre Goel PA-C spoke with Dr. Ez Salinas and Eagle Hernandez PA-C,   Specialty: Ortho  Discussed pt's hx, disposition, and available diagnostic and imaging results. Reviewed care plans. Consultant agrees with plans as outlined. Will see for consult.

## 2018-01-14 NOTE — ED NOTES
TRANSFER - OUT REPORT:    Verbal report given to Simon Menjivar(name) on Maria Fernanda Bradley  being transferred to [address] [place] [address] Road -B(unit) for routine progression of care       Report consisted of patients Situation, Background, Assessment and   Recommendations(SBAR). Information from the following report(s) SBAR, Kardex, ED Summary, Procedure Summary and MAR was reviewed with the receiving nurse. Lines:   Peripheral IV 01/14/18 Left Antecubital (Active)   Site Assessment Clean, dry, & intact 1/14/2018  5:05 PM   Phlebitis Assessment 0 1/14/2018  5:05 PM   Infiltration Assessment 0 1/14/2018  5:05 PM   Dressing Status Clean, dry, & intact 1/14/2018  5:05 PM   Dressing Type Transparent 1/14/2018  5:05 PM   Hub Color/Line Status Pink 1/14/2018  5:05 PM        Opportunity for questions and clarification was provided.       Patient transported with:       [facility]- eta approx 8pm.

## 2018-01-14 NOTE — ED NOTES
Pt resting supine, family at bedside. Eyes open, and responds to questions. Denies any pain. Sipping ginger ale without difficulty.

## 2018-01-14 NOTE — CONSULTS
ORTHO CONSULT    Subjective:     Date of Consultation:  January 14, 2018    Referring Physician:  TOM Delgado Onelia Peterson is a 80 y.o. with L prosthetic hip dislocation. Pt. Has severe dementia, [facility]. Non-ambulatory, WC bound. David Boning out of WC? Yesterday. Patient Active Problem List    Diagnosis Date Noted    Nausea and vomiting 01/08/2018    Failure to thrive in adult 01/08/2018    CKD (chronic kidney disease) stage 4, GFR 15-29 ml/min ([facility]) 01/08/2018    Dementia 01/08/2018    Hyperkalemia 07/17/2017    Altered mental status 07/17/2017    UTI (urinary tract infection) 07/02/2017    Acute respiratory failure ([address] 12/21/2014    Pulmonary embolism ([address] 12/11/2014    NIMCO (acute kidney injury) ([facility] [address] 12/11/2014    Renal lesion 11/05/2014    Autoimmune hemolytic anemia ([facility] [address] 11/05/2014    Leukocytosis 10/25/2014    HTN (hypertension) 10/19/2014    Depression 10/19/2014    Hypothyroidism 10/19/2014    Diarrhea 10/19/2014    Leukopenia 10/19/2014    Anemia 10/19/2014    Transaminitis 10/19/2014    Hyperbilirubinemia 10/19/2014    Weakness 10/18/2014     Family History   Problem Relation Age of Onset    Family history unknown: Yes      Social History   Substance Use Topics    Smoking status: Never Smoker    Smokeless tobacco: Never Used    Alcohol use No     Past Medical History:   Diagnosis Date    Arthritis     Asthma     Autoimmune hemolytic anemia (HCC)     Depression     Heart murmur     Hypertension     Hypothyroidism     Pulmonary embolism (HCC)     Thromboembolus (HCC)     Vertigo       Past Surgical History:   Procedure Laterality Date    HX COLONOSCOPY      HX HEENT      HX HYSTERECTOMY      HX ORTHOPAEDIC  R total hip    HX REFRACTIVE SURGERY        Prior to Admission medications    Medication Sig Start Date End Date Taking? Authorizing Provider   aspirin 81 mg chewable tablet Take 1 Tab by mouth daily.  1/13/18   Rubén Rizo MD potassium chloride SR (KLOR-CON 10) 10 mEq tablet Take 1 Tab by mouth two (2) times a day. 1/12/18   Rolando Kitchen MD   traZODone (DESYREL) 50 mg tablet Take 0.5 Tabs by mouth nightly. 1/12/18   Rolando Kitchen MD   acetaminophen (TYLENOL EXTRA STRENGTH) 500 mg tablet Take 1,000 mg by mouth every six (6) hours as needed (Mild-Moderate pain). Historical Provider   buPROPion XL (WELLBUTRIN XL) 300 mg XL tablet Take 300 mg by mouth every morning. Historical Provider   venlafaxine-SR [facility] 75 mg capsule Take 75 mg by mouth daily. In addition to 150 mg daily. Total 225 mg daily. Historical Provider   venlafaxine-SR (EFFEXOR XR) 150 mg capsule Take 150 mg by mouth daily. In addition to 75 mg daily. Total 225 mg daily. Melo Raya MD   levothyroxine (LEVOTHROID) 50 mcg tablet Take 50 mcg by mouth Daily (before breakfast). Historical Provider     Current Facility-Administered Medications   Medication Dose Route Frequency    morphine 4 mg  4 mg IntraVENous NOW    ondansetron (ZOFRAN) injection 4 mg  4 mg IntraVENous NOW    0.9% sodium chloride infusion  150 mL/hr IntraVENous CONTINUOUS    propofol (DIPRIVAN) 10 mg/mL injection 200 mg  200 mg IntraVENous NOW     Current Outpatient Prescriptions   Medication Sig    aspirin 81 mg chewable tablet Take 1 Tab by mouth daily.  potassium chloride SR (KLOR-CON 10) 10 mEq tablet Take 1 Tab by mouth two (2) times a day.  traZODone (DESYREL) 50 mg tablet Take 0.5 Tabs by mouth nightly.  acetaminophen (TYLENOL EXTRA STRENGTH) 500 mg tablet Take 1,000 mg by mouth every six (6) hours as needed (Mild-Moderate pain).  buPROPion XL (WELLBUTRIN XL) 300 mg XL tablet Take 300 mg by mouth every morning.  venlafaxine-SR (EFFEXOR-XR) 75 mg capsule Take 75 mg by mouth daily. In addition to 150 mg daily. Total 225 mg daily.  venlafaxine-SR (EFFEXOR XR) 150 mg capsule Take 150 mg by mouth daily. In addition to 75 mg daily. Total 225 mg daily.     levothyroxine (LEVOTHROID) 50 mcg tablet Take 50 mcg by mouth Daily (before breakfast). Allergies   Allergen Reactions    Percocet [Oxycodone-Acetaminophen] Anxiety     Anxiety, shaking     Pcn [Penicillins] Rash     Patient states this happened ~ 40 years ago  Tolerates ceftriaxone    Sulfa (Sulfonamide Antibiotics) Itching and Vertigo        Review of Systems:  A comprehensive review of systems was negative except for that written in the HPI. Objective:     Patient Vitals for the past 8 hrs:   BP Temp Pulse Resp SpO2 Height Weight   18 1625 - - - - 100 % - -   18 1612 139/85 97.6 °F (36.4 °C) 87 18 100 % 5' (1.524 m) 63.5 kg (140 lb)     Temp (24hrs), Av.6 °F (36.4 °C), Min:97.6 °F (36.4 °C), Max:97.6 °F (36.4 °C)        EXAM: I examined Pt's L hip. + pain to palpation of the L trochanteric bursa on exam. + groin pain with rotation of the hip. NVI distally BLE's. Pedal pulses = BLE's. Data Review No results found for this or any previous visit (from the past 24 hour(s)). EXAM:  XR HIP RT W OR WO PELV 2-3 VWS  Coronal history: Hip pain after fall. INDICATION:   hip pain, fall.     COMPARISON: 2028.     FINDINGS: An AP view of the pelvis and a frogleg lateral view of the right hip  demonstrate dislocation of the femoral arthroplasty component in relationship to  the acetabular component. .  Chronic deformities of the right pubic rami. Osteopenia.        IMPRESSION  IMPRESSION:       Posterior lateral hip dislocation. .     Assessment/Plan:     Closed Left Hip Dislocation    Under conscious sedation per the ER MD, I reduced the L hip, L knee was internally rotated and L hip was palpated reduced into the acetabular component. When pt. Supine, medial malleolus's are equal in length. NVI distally post reduction. Xray confirms successful reduction and pt. Placed into knee immobilizer and abduction pillow between legs. Nsg home WC bound. . Hip precautions and follow up with  Ilana Lomeli next week. Thank you for allowing us to take part in this patients care. Shelton Huntley PA-C  Orthopaedic Surgery [address]  [address]  [phone] PA-C

## 2018-01-15 NOTE — ED NOTES
Pt cleaned and new brief applied. No  Bowel movement at this time. Bedside and Verbal shift change report given to PROMISE Holguin (oncoming nurse) by Osmar Hernandez (offgoing nurse).  Report included the following information SBAR, Kardex, ED Summary, Intake/Output, MAR, Recent Results and Cardiac Rhythm SR.

## 2018-01-15 NOTE — ED NOTES
[facility] on unit to transport patient. VSS as noted. Patient stable for transport. Discharge paperwork given to EMS provider.

## 2018-02-27 ENCOUNTER — APPOINTMENT (OUTPATIENT)
Dept: GENERAL RADIOLOGY | Age: 83
End: 2018-02-27
Attending: PHYSICIAN ASSISTANT
Payer: MEDICARE

## 2018-02-27 ENCOUNTER — HOSPITAL ENCOUNTER (EMERGENCY)
Age: 83
Discharge: OTHER INSTITUTION | End: 2018-02-27
Attending: EMERGENCY MEDICINE | Admitting: EMERGENCY MEDICINE
Payer: MEDICARE

## 2018-02-27 VITALS
HEART RATE: 87 BPM | DIASTOLIC BLOOD PRESSURE: 59 MMHG | WEIGHT: 140 LBS | SYSTOLIC BLOOD PRESSURE: 126 MMHG | TEMPERATURE: 98.2 F | HEIGHT: 60 IN | RESPIRATION RATE: 23 BRPM | BODY MASS INDEX: 27.48 KG/M2 | OXYGEN SATURATION: 98 %

## 2018-02-27 DIAGNOSIS — S73.004A HIP DISLOCATION, RIGHT, INITIAL ENCOUNTER (HCC): Primary | ICD-10-CM

## 2018-02-27 PROCEDURE — 73501 X-RAY EXAM HIP UNI 1 VIEW: CPT

## 2018-02-27 PROCEDURE — 75810000301 HC ER LEVEL 1 CLOSED TREATMNT FRACTURE/DISLOCATION

## 2018-02-27 PROCEDURE — 74011250636 HC RX REV CODE- 250/636: Performed by: EMERGENCY MEDICINE

## 2018-02-27 PROCEDURE — 74011250636 HC RX REV CODE- 250/636: Performed by: PHYSICIAN ASSISTANT

## 2018-02-27 PROCEDURE — L1830 KO IMMOB CANVAS LONG PRE OTS: HCPCS

## 2018-02-27 PROCEDURE — 99285 EMERGENCY DEPT VISIT HI MDM: CPT

## 2018-02-27 PROCEDURE — 71045 X-RAY EXAM CHEST 1 VIEW: CPT

## 2018-02-27 PROCEDURE — 93005 ELECTROCARDIOGRAM TRACING: CPT

## 2018-02-27 RX ORDER — PROPOFOL 10 MG/ML
1 INJECTION, EMULSION INTRAVENOUS
Status: COMPLETED | OUTPATIENT
Start: 2018-02-27 | End: 2018-02-27

## 2018-02-27 RX ORDER — ONDANSETRON 2 MG/ML
4 INJECTION INTRAMUSCULAR; INTRAVENOUS
Status: DISCONTINUED | OUTPATIENT
Start: 2018-02-27 | End: 2018-02-28 | Stop reason: HOSPADM

## 2018-02-27 RX ORDER — MORPHINE SULFATE 2 MG/ML
4 INJECTION, SOLUTION INTRAMUSCULAR; INTRAVENOUS
Status: DISCONTINUED | OUTPATIENT
Start: 2018-02-27 | End: 2018-02-28 | Stop reason: HOSPADM

## 2018-02-27 RX ADMIN — PROPOFOL 63.5 MG: 10 INJECTION, EMULSION INTRAVENOUS at 20:05

## 2018-02-28 LAB
ATRIAL RATE: 89 BPM
CALCULATED P AXIS, ECG09: 56 DEGREES
CALCULATED R AXIS, ECG10: 2 DEGREES
CALCULATED T AXIS, ECG11: 163 DEGREES
DIAGNOSIS, 93000: NORMAL
P-R INTERVAL, ECG05: 170 MS
Q-T INTERVAL, ECG07: 354 MS
QRS DURATION, ECG06: 86 MS
QTC CALCULATION (BEZET), ECG08: 430 MS
VENTRICULAR RATE, ECG03: 89 BPM

## 2018-02-28 NOTE — ED NOTES
ED EKG interpretation:  Rhythm: normal sinus rhythm; and regular .  Rate (approx.): 89; Axis: normal; P wave: normal; QRS interval: normal ; ST/T wave: T wave inverted; in  Lead: anterior leads, no changes from EKG 1/8/2018  EKG documented by Driss Wheatley MD

## 2018-02-28 NOTE — ED PROVIDER NOTES
HPI Comments: Louis Rivera is a 80 y.o. female  who presents by EMS to ER with c/o Patient presents with:  Hip Pain. Patient with right hip pain today. Patient is resident at nursing home, no known injury. Patients had xray today at nursing home showing right hip dislocation. Patient with history of same recently. Patient had hip replacement. Denies any other injury or pain. She specifically denies any fevers, chills, nausea, vomiting, chest pain, shortness of breath, headache, rash, diarrhea, abdominal pain, urinary/bowel changes, sweating or weight loss. PCP: Kasia Flores MD   PMHx significant for: Past Medical History:  No date: Arthritis  No date: Asthma  No date: Autoimmune hemolytic anemia ([facility] [address]  No date: Depression  No date: Heart murmur  No date: Hypertension  No date: Hypothyroidism  No date: Pulmonary embolism (HCC)  No date: Thromboembolus ([facility] [address]  No date: Vertigo   PSHx significant for: Past Surgical History:  No date: HX COLONOSCOPY  No date: HX HEENT  No date: HX HYSTERECTOMY  R total hip: HX ORTHOPAEDIC  No date: HX REFRACTIVE SURGERY  Social Hx: Tobacco use: Smoking status: Never Smoker                                                              Smokeless status: Never Used                      ; EtOH use: The patient states she drinks 0 per week.; Illicit Drug use: Allergies:   -- Percocet (Oxycodone-Acetaminophen) -- Anxiety    --  Anxiety, shaking   -- Pcn (Penicillins) -- Rash    --  Patient states this happened ~ 40 years ago             Tolerates ceftriaxone   -- Sulfa (Sulfonamide Antibiotics) -- Itching and Vertigo    There are no other complaints, changes or physical findings at this time. Patient is a 80 y.o. female presenting with hip pain. The history is provided by the patient. Hip Injury    This is a new problem. The current episode started 6 to 12 hours ago. The problem occurs constantly. The problem has not changed since onset. The pain is present in the right hip. The quality of the pain is described as aching. The pain is mild. Associated symptoms include limited range of motion. Pertinent negatives include no numbness, no stiffness, no tingling, no itching, no back pain and no neck pain. The symptoms are aggravated by movement. She has tried nothing for the symptoms. There has been no history of extremity trauma. Past Medical History:   Diagnosis Date    Arthritis     Asthma     Autoimmune hemolytic anemia (HCC)     Depression     Heart murmur     Hypertension     Hypothyroidism     Pulmonary embolism (HCC)     Thromboembolus (HCC)     Vertigo        Past Surgical History:   Procedure Laterality Date    HX COLONOSCOPY      HX HEENT      HX HYSTERECTOMY      HX ORTHOPAEDIC  R total hip    HX REFRACTIVE SURGERY           Family History:   Problem Relation Age of Onset    Family history unknown: Yes       Social History     Social History    Marital status:      Spouse name: N/A    Number of children: N/A    Years of education: N/A     Occupational History    Not on file. Social History Main Topics    Smoking status: Never Smoker    Smokeless tobacco: Never Used    Alcohol use No    Drug use: No    Sexual activity: Not on file     Other Topics Concern    Not on file     Social History Narrative         ALLERGIES: Percocet [oxycodone-acetaminophen]; Pcn [penicillins]; and Sulfa (sulfonamide antibiotics)    Review of Systems   Constitutional: Negative. HENT: Negative. Eyes: Negative. Respiratory: Negative. Cardiovascular: Negative. Gastrointestinal: Negative. Endocrine: Negative. Genitourinary: Negative. Musculoskeletal: Positive for arthralgias. Negative for back pain, neck pain and stiffness. Skin: Negative. Negative for itching. Allergic/Immunologic: Negative. Neurological: Negative. Negative for tingling and numbness. Hematological: Negative. Psychiatric/Behavioral: Negative. All other systems reviewed and are negative. Vitals:    02/27/18 1910   BP: 129/70   Pulse: 90   Resp: 14   Temp: 98.7 °F (37.1 °C)   SpO2: 98%   Weight: 63.5 kg (140 lb)   Height: 5' (1.524 m)            Physical Exam   Constitutional: She is oriented to person, place, and time. She appears well-developed. HENT:   Head: Normocephalic and atraumatic. Right Ear: External ear normal.   Left Ear: External ear normal.   Nose: Nose normal.   Mouth/Throat: Oropharynx is clear and moist. No oropharyngeal exudate. Eyes: Conjunctivae, EOM and lids are normal. Right eye exhibits no discharge. Left eye exhibits no discharge. Neck: Normal range of motion. No tracheal deviation present. No thyromegaly present. Cardiovascular: Normal rate, regular rhythm, normal heart sounds and intact distal pulses. Pulmonary/Chest: Effort normal and breath sounds normal.   Abdominal: Soft. Normal appearance and bowel sounds are normal.   Musculoskeletal:        Right hip: She exhibits tenderness and deformity. RLE is shortened and internally rotated. Dressing in place of external hip. RLE is NVI, dorsalis pedis pulse found with doppler, sensation intact, capillary refill <3 seconds. Neurological: She is alert and oriented to person, place, and time. Skin: Skin is warm and dry. Psychiatric: She has a normal mood and affect. Judgment normal.        MDM  Number of Diagnoses or Management Options  Hip dislocation, right, initial encounter [facility]):   Diagnosis management comments: Assesment/Plan- 80 y.o. Patient presents with:  Hip Pain  differential includes: dislocation vs fracture. imaging reviewed with right hip dislocation. Reduced in ED by Ortho with conscious sedation. Knee immobilizer placed. Recommend Ortho follow up. Patient educated on reasons to return to the ED.          Amount and/or Complexity of Data Reviewed  Tests in the radiology section of CPT®: ordered and reviewed          ED Course Procedures             CONSULT NOTE:   7:30 PM  Veena Chopra PA-C spoke with Kylee Ramirez PA-C and Dr. Piedad Sal,   Specialty: Ortho  Discussed pt's hx, disposition, and available diagnostic and imaging results. Reviewed care plans. Consultant agrees with plans as outlined. Will see in ED. Evgeny Melvin

## 2018-02-28 NOTE — DISCHARGE INSTRUCTIONS
Dislocated Hip: Care Instructions  Your Care Instructions    Your hip is a large and fairly stable joint. Normally it takes a hard fall, a car accident, or something else of great force to make the thighbone slip out of its socket (dislocate). But if you have had hip replacement surgery, your hip can more easily slip out of position. This is more common during the first few months after the surgery. After your doctor puts your dislocated hip back into normal position, you will need to use a walking aid or hip brace for several weeks or months while the hip heals. You will need to follow special hip precautions to avoid dislocating your hip again. Your doctor may recommend exercises to strengthen the hip joint and your legs. Rest and home treatment can help you heal.  If your hip becomes dislocated again, contact your doctor. You will need to go to a hospital or clinic to have your hip put back in position. You may have had a sedative to help you relax. You may be unsteady after having sedation. It can take a few hours for the medicine's effects to wear off. Common side effects of sedation include nausea, vomiting, and feeling sleepy or tired. The doctor has checked you carefully, but problems can develop later. If you notice any problems or new symptoms, get medical treatment right away. Follow-up care is a key part of your treatment and safety. Be sure to make and go to all appointments, and call your doctor if you are having problems. It's also a good idea to know your test results and keep a list of the medicines you take. How can you care for yourself at home? · If the doctor gave you a sedative:  ¨ For 24 hours, don't do anything that requires attention to detail. It takes time for the medicine's effects to completely wear off. ¨ For your safety, do not drive or operate any machinery that could be dangerous. Wait until the medicine wears off and you can think clearly and react easily.   · Your doctor will give you safety precautions to keep your hip centered in its socket during the healing period. Be sure to follow these precautions. ¨ Keep your knees and toes pointed forward when you sit in a chair, walk, or stand. ¨ Do not sit with your legs crossed. ¨ Do not bend at the waist more than 90º. Be careful when leaning or when moving in bed to keep your legs as straight ahead as possible. · If you have a hip brace, wear it as directed. Do not remove it unless your doctor says you can. If you remove the brace to shower, be extremely careful. Follow hip precautions to limit hip movement. · Rest your hip as much as you can. You will need to change your activities to avoid movements that irritate the hip. · If your hip is swollen, put ice or a cold pack on it for 10 to 20 minutes at a time. Try to do this every 1 to 2 hours for the next 3 days (when you are awake) or until the swelling goes down. Put a thin cloth between the ice and your skin. · Take your medicines exactly as prescribed. Call your doctor if you think you are having a problem with your medicine. · Ask your doctor if you can take an over-the-counter pain medicine, such as acetaminophen (Tylenol), ibuprofen (Advil, Motrin), or naproxen (Aleve). Be safe with medicines. Read and follow all instructions on the label. · If your doctor recommends exercises, do them as directed. When should you call for help? Call 911 anytime you think you may need emergency care. For example, call if:  ? · You have chest pain, are short of breath, or cough up blood. ? · You have trouble breathing. ? · You passed out (lost consciousness). ?Call your doctor now or seek immediate medical care if:  ? · You have new or worse nausea or vomiting. ? · You have new or worse pain. ? · Your foot is cool or pale or changes color. ? · You have tingling, weakness, or numbness in your foot or toes.    ? · You have signs of a blood clot in your leg (called a deep vein thrombosis), such as:  ¨ Pain in your calf, back of the knee, thigh, or groin. ¨ Redness or swelling in your leg. ? Watch closely for changes in your health, and be sure to contact your doctor if:  ? · You do not get better as expected. Where can you learn more? Go to http://bernardino-stanley.info/. Enter F478 in the search box to learn more about \"Dislocated Hip: Care Instructions. \"  Current as of: March 21, 2017  Content Version: 11.4  © 8773-8210 [employer]. Care instructions adapted under license by [employer] (which disclaims liability or warranty for this information). If you have questions about a medical condition or this instruction, always ask your healthcare professional. [address] any warranty or liability for your use of this information. We hope that we have addressed all of your medical concerns. The examination and treatment you received in the Emergency Department were for an emergent problem and were not intended as complete care. It is important that you follow up with your healthcare provider(s) for ongoing care. If your symptoms worsen or do not improve as expected, and you are unable to reach your usual health care provider(s), you should return to the Emergency Department. Today's healthcare is undergoing tremendous change, and patient satisfaction surveys are one of the many tools to assess the quality of medical care. You may receive a survey from the [employer] organization regarding your experience in the Emergency Department. I hope that your experience has been completely positive, particularly the medical care that I provided. As such, please participate in the survey; anything less than excellent does not meet my expectations or intentions. [facility] and [facility] participate in nationally recognized quality of care measures.   If your blood pressure is greater than 120/80, as reported below, we urge that you seek medical care to address the potential of high blood pressure, commonly known as hypertension. Hypertension can be hereditary or can be caused by certain medical conditions, pain, stress, or \"white coat syndrome. \"       Please make an appointment with your health care provider(s) for follow up of your Emergency Department visit. VITALS:   Patient Vitals for the past 8 hrs:   Temp Pulse Resp BP SpO2   02/27/18 2045 98.2 °F (36.8 °C) 88 17 117/62 100 %   02/27/18 2045 - 85 18 125/63 100 %   02/27/18 2042 - 81 11 115/62 99 %   02/27/18 2040 - 80 12 118/57 100 %   02/27/18 2038 - 80 11 111/52 100 %   02/27/18 2037 - 80 10 103/47 100 %   02/27/18 2035 - 80 18 105/47 100 %   02/27/18 2033 - 80 18 107/52 100 %   02/27/18 2031 - 82 18 98/67 98 %   02/27/18 2028 - 90 16 128/66 100 %   02/27/18 2024 98.7 °F (37.1 °C) - - - -   02/27/18 2023 - 89 20 130/68 100 %   02/27/18 1910 98.7 °F (37.1 °C) 90 14 129/70 98 %          Thank you for allowing us to provide you with medical care today. We realize that you have many choices for your emergency care needs. Please choose us in the future for any continued health care needs. Belinda Diaz, [facility]: [phone]            Recent Results (from the past 24 hour(s))   EKG, 12 LEAD, INITIAL    Collection Time: 02/27/18  7:50 PM   Result Value Ref Range    Ventricular Rate 89 BPM    Atrial Rate 89 BPM    P-R Interval 170 ms    QRS Duration 86 ms    Q-T Interval 354 ms    QTC Calculation (Bezet) 430 ms    Calculated P Axis 56 degrees    Calculated R Axis 2 degrees    Calculated T Axis 163 degrees    Diagnosis       Normal sinus rhythm  ST & T wave abnormality, consider anterolateral ischemia  Abnormal ECG  When compared with ECG of 08-JAN-2018 14:13,  ST less depressed in Anterior leads         Xr Chest Port    Result Date: 2/27/2018  EXAM:  XR CHEST PORT INDICATION:  Right hip prosthesis dislocation. Require sedation for relocation. COMPARISON: Chest view on 1/8/2018 TECHNIQUE: Semiupright portable chest AP view FINDINGS: Cardiac and hilar contours are within normal limits. Aortic arch is unchanged. The pulmonary vasculature is within normal limits. The lungs and pleural spaces are clear. Osteopenia is unchanged. IMPRESSION: No acute process on portable chest. No change. Xr Hip Rt W Or Wo Pelv  1 Vw    Result Date: 2/27/2018  EXAM:  XR HIP RT W OR WO PELV  1 VW INDICATION:   Right hip prosthetic dislocation without pain. No fall or injury. COMPARISON: Right hip views on 1/14/2018. FINDINGS: Single portable AP view of the right hip demonstrates right hip prosthetic dislocation. No evidence of fracture. IMPRESSION:  Prosthetic right hip dislocation.

## 2018-02-28 NOTE — ED NOTES
[facility]     Name: Yudith Hicks  Date:   2/27/2018    Procedure Details:    Immediately prior to the procedure, the patient was reevaluated and found suitable for the planned procedure and any planned medications. Immediately prior to the procedure a time out was called to verify the correct patient, procedure, equipment, staff, and marking as appropriate. Procedural sedation has been advised for this patient associated with right hip dislocation. The risks, benefits, and alternatives have been explained to the patient and caregiver and She consents to the sedation. The patient last ate 3 hours ago. The ASA score is ASA 2 - Mild systemic disease. The patient is having all vital signs monitored by an attending RN as well as pulse oximetry and end tidal C02 monitoring. Mallampati Score Reference: The patient has a patent airway  With a Mallampati Classification Score of II (soft palate, uvula, fauces visible). The patient was sedated with propofol/DIPRIVAN. Reversal agents and resuscitation equipment were at the bedside. The reduction was done by Bear Ritter, and the patient tolerated the procedure well with no complications. Reversal agents were not used. My time at the bedside was 20 Minutes and the effects of the sedation are wearing off. The patient is being observed in the ED until She returns to baseline.           Signed By: Arin Fang MD

## 2018-02-28 NOTE — ED TRIAGE NOTES
Pt recent right hip replacement, XR done today and showed right femoral head dislocation, no known fall or injury, pt denies pain

## 2020-01-24 NOTE — CONSULTS
JOINT  CONSULT    Subjective:     Date of Consultation:  February 27, 2018    Referring Physician:  Belkys Martin PA-C    Maria Fernanda Pratt is a 80 y.o. demented female who resides in a SNF, WC bound, non ambulatory, found in bed with R hip deformity. Hx of R NILESH 2006 by unknown surgeon, dislocation first time last month. Never followed up with Dr. Jason Lopez as discussed per [place] daughter who is POA. Pt. Is pleasantly confused. No trauma per SNF.       Patient Active Problem List    Diagnosis Date Noted    Nausea and vomiting 01/08/2018    Failure to thrive in adult 01/08/2018    CKD (chronic kidney disease) stage 4, GFR 15-29 ml/min ([facility]) 01/08/2018    Dementia 01/08/2018    Hyperkalemia 07/17/2017    Altered mental status 07/17/2017    UTI (urinary tract infection) 07/02/2017    Acute respiratory failure ([address] 12/21/2014    Pulmonary embolism ([address] 12/11/2014    NIMCO (acute kidney injury) ([address] 12/11/2014    Renal lesion 11/05/2014    Autoimmune hemolytic anemia ([address] 11/05/2014    Leukocytosis 10/25/2014    HTN (hypertension) 10/19/2014    Depression 10/19/2014    Hypothyroidism 10/19/2014    Diarrhea 10/19/2014    Leukopenia 10/19/2014    Anemia 10/19/2014    Transaminitis 10/19/2014    Hyperbilirubinemia 10/19/2014    Weakness 10/18/2014     Family History   Problem Relation Age of Onset    Family history unknown: Yes      Social History   Substance Use Topics    Smoking status: Never Smoker    Smokeless tobacco: Never Used    Alcohol use No     Past Medical History:   Diagnosis Date    Arthritis     Asthma     Autoimmune hemolytic anemia (HCC)     Depression     Heart murmur     Hypertension     Hypothyroidism     Pulmonary embolism (HCC)     Thromboembolus (HCC)     Vertigo       Past Surgical History:   Procedure Laterality Date    HX COLONOSCOPY      HX HEENT      HX HYSTERECTOMY      HX ORTHOPAEDIC  R total hip    HX REFRACTIVE SURGERY        Prior to Admission medications    Medication Sig Start Date End Date Taking? Authorizing Provider   aspirin 81 mg chewable tablet Take 1 Tab by mouth daily. 1/13/18   Sander Goodpasture, MD   potassium chloride SR (KLOR-CON 10) 10 mEq tablet Take 1 Tab by mouth two (2) times a day. 1/12/18   Sander Goodpasture, MD   traZODone (DESYREL) 50 mg tablet Take 0.5 Tabs by mouth nightly. 1/12/18   Sander Goodpasture, MD   acetaminophen (TYLENOL EXTRA STRENGTH) 500 mg tablet Take 1,000 mg by mouth every six (6) hours as needed (Mild-Moderate pain). Historical Provider   buPROPion XL (WELLBUTRIN XL) 300 mg XL tablet Take 300 mg by mouth every morning. Historical Provider   venlafaxine-SR [facility] 75 mg capsule Take 75 mg by mouth daily. In addition to 150 mg daily. Total 225 mg daily. Historical Provider   venlafaxine-SR (EFFEXOR XR) 150 mg capsule Take 150 mg by mouth daily. In addition to 75 mg daily. Total 225 mg daily. Melo Raya MD   levothyroxine (LEVOTHROID) 50 mcg tablet Take 50 mcg by mouth Daily (before breakfast). Historical Provider     Current Facility-Administered Medications   Medication Dose Route Frequency    morphine injection 4 mg  4 mg IntraVENous NOW    ondansetron (ZOFRAN) injection 4 mg  4 mg IntraVENous NOW     Current Outpatient Prescriptions   Medication Sig    aspirin 81 mg chewable tablet Take 1 Tab by mouth daily.  potassium chloride SR (KLOR-CON 10) 10 mEq tablet Take 1 Tab by mouth two (2) times a day.  traZODone (DESYREL) 50 mg tablet Take 0.5 Tabs by mouth nightly.  acetaminophen (TYLENOL EXTRA STRENGTH) 500 mg tablet Take 1,000 mg by mouth every six (6) hours as needed (Mild-Moderate pain).  buPROPion XL (WELLBUTRIN XL) 300 mg XL tablet Take 300 mg by mouth every morning.  venlafaxine-SR (EFFEXOR-XR) 75 mg capsule Take 75 mg by mouth daily. In addition to 150 mg daily. Total 225 mg daily.  venlafaxine-SR (EFFEXOR XR) 150 mg capsule Take 150 mg by mouth daily.  In addition to 75 mg daily. Total 225 mg daily.  levothyroxine (LEVOTHROID) 50 mcg tablet Take 50 mcg by mouth Daily (before breakfast). Allergies   Allergen Reactions    Percocet [Oxycodone-Acetaminophen] Anxiety     Anxiety, shaking     Pcn [Penicillins] Rash     Patient states this happened ~ 40 years ago  Tolerates ceftriaxone    Sulfa (Sulfonamide Antibiotics) Itching and Vertigo        Review of Systems:  A comprehensive review of systems was negative except for that written in the HPI. Objective:     Patient Vitals for the past 8 hrs:   BP Temp Pulse Resp SpO2 Height Weight   18 117/62 98.2 °F (36.8 °C) 88 17 100 % - -   18 125/63 - 85 18 100 % - -   18 115/62 - 81 11 99 % - -   18 118/57 - 80 12 100 % - -   18 111/52 - 80 11 100 % - -   18 103/47 - 80 10 100 % - -   18 105/47 - 80 18 100 % - -   18 107/52 - 80 18 100 % - -   18 98/67 - 82 18 98 % - -   18 128/66 - 90 16 100 % - -   18 - 98.7 °F (37.1 °C) - - - - -   18 130/68 - 89 20 100 % - -   18 129/70 98.7 °F (37.1 °C) 90 14 98 % 5' (1.524 m) 63.5 kg (140 lb)     Temp (24hrs), Av.5 °F (36.9 °C), Min:98.2 °F (36.8 °C), Max:98.7 °F (37.1 °C)        EXAM: I examined pt's R hip, palpable dislocation, unable to rotate hip secondary to pain. NVI distally BLE's. XR L hip:  EXAM:  XR HIP RT W OR WO PELV  1 VW     INDICATION:   Right hip prosthetic dislocation without pain. No fall or injury.     COMPARISON: Right hip views on 2018.     FINDINGS: Single portable AP view of the right hip demonstrates right hip  prosthetic dislocation. No evidence of fracture.     IMPRESSION  IMPRESSION:       Prosthetic right hip dislocation. Pt. In too much pain for cross table view.     Data Review   Recent Results (from the past 24 hour(s))   EKG, 12 LEAD, INITIAL    Collection Time: 18  7:50 PM   Result Value Ref Range    Ventricular Rate 89 BPM    Atrial Rate 89 BPM    P-R Interval 170 ms    QRS Duration 86 ms    Q-T Interval 354 ms    QTC Calculation (Bezet) 430 ms    Calculated P Axis 56 degrees    Calculated R Axis 2 degrees    Calculated T Axis 163 degrees    Diagnosis       Normal sinus rhythm  ST & T wave abnormality, consider anterolateral ischemia  Abnormal ECG  When compared with ECG of 08-JAN-2018 14:13,  ST less depressed in Anterior leads           Assessment/Plan:     Closed Right Hip Dislocation      Under conscious sedation per the ER EVELIA DAVENPORT reduced the R hip, R knee was internally rotated and R hip was palpated reduced into the acetabular component. She is fairly easy to dislocate while in flexion. When pt. Supine, medial malleolus's are equal in length. NVI distally post reduction. Xray confirms successful reduction and pt. Placed into knee immobilizer and abduction pillow between legs. Hip precautions and follow up with Dr. Mari Marino next week. Dameon Lieberman agrees with plan. Thank you for allowing us to take part in this patients care. Evan Walsh PA-C  Orthopaedic Surgery [address]  [address]  [address] [facility]: Polysporin ointment

## 2021-01-30 NOTE — PROGRESS NOTES
Ortho Progress Note        S:  Patient is resting comfortably in room. Daughter and granddaughter at her side. O:  NVI distally BL LE.  EHL and EFL 5/5 BL. No significant LE atrophy noted. A:  L3 compression fracture    P:  I explained to the daughter and granddaughter her diagnosis and went over the imaging with them. I explained to them this type of fracture is stable and should be amendable to TLSO and PT. With her mental status it may be difficult to have her wear the TLSO when ambulating, but the family agrees it is the best option. Continue pain control. If symptoms change or worsen will need MRI. Follow-up with Dr. Marlen Huffman in 10-14 days from discharge. Dr. Jenaro Dugan agrees with plan. Thank you for allowing us to take part in this patients care. ESDRAS Dao  Orthopaedic Surgery [address]  [address]  [phone] fair balance

## 2022-06-14 NOTE — PROGRESS NOTES
[address] [place] [address]  YOB: 1932          Assessment & Plan:   1. NIMCO  - UA: Mild protein,   - US: no hydro  - Cr better with Volume  - No RRT needed  - Etiology remains elusive  - I rev home meds: not on Nephrotoxins  - ? Was on NSAIDs  - ck ok  - cont volume  2. Leukocytosis  - on CTX  3. HTN  - Not on meds       Subjective:   CC:arf  HPI: Patient seen   NIMCO is better, no nvd. No cp/sob    ROS: neg for 12 syst  Current Facility-Administered Medications   Medication Dose Route Frequency    buPROPion XL (WELLBUTRIN XL) tablet 150 mg  150 mg Oral DAILY    traZODone (DESYREL) tablet 25 mg  25 mg Oral QHS    haloperidol lactate (HALDOL) injection 1 mg  1 mg IntraVENous Q6H PRN    haloperidol lactate (HALDOL) injection 1 mg  1 mg IntraMUSCular Q6H PRN    sodium chloride (NS) flush 5-10 mL  5-10 mL IntraVENous Q8H    sodium chloride (NS) flush 5-10 mL  5-10 mL IntraVENous PRN    naloxone (NARCAN) injection 0.4 mg  0.4 mg IntraVENous PRN    levothyroxine (SYNTHROID) tablet 50 mcg  50 mcg Oral ACB    venlafaxine-SR (EFFEXOR-XR) capsule 225 mg  225 mg Oral DAILY    cefTRIAXone (ROCEPHIN) 1 g in 0.9% sodium chloride (MBP/ADV) 50 mL  1 g IntraVENous Q24H    traMADol (ULTRAM) tablet 50 mg  50 mg Oral Q6H PRN    0.9% sodium chloride infusion  75 mL/hr IntraVENous CONTINUOUS    heparin (porcine) injection 5,000 Units  5,000 Units SubCUTAneous Q8H    ondansetron (ZOFRAN) injection 4 mg  4 mg IntraVENous Q4H PRN          Objective:     Vitals:  Blood pressure 145/67, pulse 88, temperature 98.6 °F (37 °C), resp. rate 17, height 5' 3\" (1.6 m), weight 70.3 kg (155 lb), SpO2 98 %, not currently breastfeeding.   Temp (24hrs), Av.4 °F (36.9 °C), Min:98 °F (36.7 °C), Max:98.7 °F (37.1 °C)      Intake and Output:      1901 -  0700  In: -   Out: 400 [Urine:400]    Physical Exam:                Patient is intubated:  no    Physical Pt is aware  Examination:   GENERAL ASSESSMENT: NAD  HEENT:Nontraumatic   CHEST: CTA  HEART: S1S2  ABDOMEN: Soft,NT,  :[place]: n  EXTREMITY: EDEMA  NEURO:Grossly non focal          ECG/rhythm:    Data Review      No results for input(s): TNIPOC in the last 72 hours. No lab exists for component: ITNL   Recent Labs      07/02/17   0058   CPK  50   CKMB  <1.0   TROIQ  <0.04     Recent Labs      07/04/17   0335  07/03/17   1035  07/02/17   0058   NA  141  139  137   K  4.3  4.3  4.3   CL  107  106  102   CO2  19*  23  20*   BUN  34*  39*  44*   CREA  3.44*  3.78*  3.98*   GLU  89  86  124*   MG  1.7  1.5*   --    CA  8.5  8.3*  9.3   ALB  2.8*  3.2*  3.6   WBC  7.4  9.0  15.2*   HGB  11.2*  12.1  13.9   HCT  33.8*  36.7  40.8   PLT  220  216  252      No results for input(s): INR, PTP, APTT in the last 72 hours. No lab exists for component: INREXT, INREXT  Needs: urine analysis, urine sodium, protein and creatinine  Lab Results   Component Value Date/Time    Sodium urine, random 55 07/02/2017 02:41 AM    Creatinine, urine 187.78 12/21/2014 03:50 PM         Discussed with:  Family, Colleague    : Eloisa Minor MD  7/4/2017        [facility]:  [email or web address]  [place] office:  [address] [place] [address], [address] [facility] [address]  [place], [address] [facility]  Phone: [phone]  Fax :     [phone]    [place] office:  [address] [facility], [address] [facility]  Phone - [phone]  Fax - [phone] hair removal not indicated